# Patient Record
Sex: FEMALE | Race: WHITE | NOT HISPANIC OR LATINO | Employment: OTHER | URBAN - NONMETROPOLITAN AREA
[De-identification: names, ages, dates, MRNs, and addresses within clinical notes are randomized per-mention and may not be internally consistent; named-entity substitution may affect disease eponyms.]

---

## 2018-10-23 ENCOUNTER — OFFICE VISIT (OUTPATIENT)
Dept: FAMILY MEDICINE CLINIC | Facility: CLINIC | Age: 63
End: 2018-10-23

## 2018-10-23 VITALS
SYSTOLIC BLOOD PRESSURE: 130 MMHG | TEMPERATURE: 98.3 F | RESPIRATION RATE: 18 BRPM | HEART RATE: 57 BPM | HEIGHT: 65 IN | WEIGHT: 192.6 LBS | BODY MASS INDEX: 32.09 KG/M2 | OXYGEN SATURATION: 98 % | DIASTOLIC BLOOD PRESSURE: 78 MMHG

## 2018-10-23 DIAGNOSIS — I10 ESSENTIAL HYPERTENSION: Primary | ICD-10-CM

## 2018-10-23 DIAGNOSIS — B00.9 HERPES SIMPLEX: ICD-10-CM

## 2018-10-23 DIAGNOSIS — E78.2 MIXED HYPERLIPIDEMIA: ICD-10-CM

## 2018-10-23 DIAGNOSIS — M72.2 PLANTAR FASCIITIS: ICD-10-CM

## 2018-10-23 DIAGNOSIS — E55.9 VITAMIN D DEFICIENCY: ICD-10-CM

## 2018-10-23 PROBLEM — M81.0 OSTEOPOROSIS: Status: ACTIVE | Noted: 2018-10-23

## 2018-10-23 PROBLEM — D12.2 BENIGN NEOPLASM OF ASCENDING COLON: Status: ACTIVE | Noted: 2018-10-23

## 2018-10-23 PROBLEM — E78.5 HYPERLIPIDEMIA: Status: ACTIVE | Noted: 2018-10-23

## 2018-10-23 PROCEDURE — 99203 OFFICE O/P NEW LOW 30 MIN: CPT | Performed by: NURSE PRACTITIONER

## 2018-10-23 RX ORDER — NAPROXEN SODIUM 550 MG/1
550 TABLET ORAL 2 TIMES DAILY WITH MEALS
COMMUNITY
End: 2018-10-23 | Stop reason: SDUPTHER

## 2018-10-23 RX ORDER — VALACYCLOVIR HYDROCHLORIDE 500 MG/1
1000 TABLET, FILM COATED ORAL 2 TIMES DAILY
COMMUNITY
End: 2018-10-23 | Stop reason: SDUPTHER

## 2018-10-23 RX ORDER — LOVASTATIN 20 MG/1
20 TABLET ORAL NIGHTLY
Qty: 90 TABLET | Refills: 1 | Status: SHIPPED | OUTPATIENT
Start: 2018-10-23 | End: 2019-02-12 | Stop reason: SDUPTHER

## 2018-10-23 RX ORDER — ASPIRIN 81 MG/1
81 TABLET, CHEWABLE ORAL DAILY
COMMUNITY

## 2018-10-23 RX ORDER — VALACYCLOVIR HYDROCHLORIDE 500 MG/1
1000 TABLET, FILM COATED ORAL 2 TIMES DAILY
Qty: 60 TABLET | Refills: 5 | Status: SHIPPED | OUTPATIENT
Start: 2018-10-23 | End: 2019-10-16 | Stop reason: SDUPTHER

## 2018-10-23 RX ORDER — NAPROXEN SODIUM 550 MG/1
550 TABLET ORAL 2 TIMES DAILY WITH MEALS
Qty: 90 TABLET | Refills: 1 | OUTPATIENT
Start: 2018-10-23 | End: 2019-02-11 | Stop reason: HOSPADM

## 2018-10-23 RX ORDER — MULTIVIT WITH MINERALS/LUTEIN
1000 TABLET ORAL DAILY
COMMUNITY
End: 2019-02-11

## 2018-10-23 RX ORDER — LOVASTATIN 20 MG/1
20 TABLET ORAL NIGHTLY
COMMUNITY
End: 2018-10-23 | Stop reason: SDUPTHER

## 2018-10-23 RX ORDER — ATENOLOL 50 MG/1
50 TABLET ORAL DAILY
Qty: 90 TABLET | Refills: 1 | Status: SHIPPED | OUTPATIENT
Start: 2018-10-23 | End: 2019-02-12

## 2018-10-23 RX ORDER — HYDROCHLOROTHIAZIDE 25 MG/1
25 TABLET ORAL DAILY
Qty: 90 TABLET | Refills: 1 | Status: SHIPPED | OUTPATIENT
Start: 2018-10-23 | End: 2019-02-12

## 2018-10-23 RX ORDER — ATENOLOL 50 MG/1
50 TABLET ORAL DAILY
COMMUNITY
End: 2018-10-23 | Stop reason: SDUPTHER

## 2018-10-23 RX ORDER — HYDROCHLOROTHIAZIDE 25 MG/1
25 TABLET ORAL DAILY
COMMUNITY
End: 2018-10-23 | Stop reason: SDUPTHER

## 2018-10-23 NOTE — PATIENT INSTRUCTIONS
Plantar Fasciitis  Plantar fasciitis is a painful foot condition that affects the heel. It occurs when the band of tissue that connects the toes to the heel bone (plantar fascia) becomes irritated. This can happen after exercising too much or doing other repetitive activities (overuse injury). The pain from plantar fasciitis can range from mild irritation to severe pain that makes it difficult for you to walk or move. The pain is usually worse in the morning or after you have been sitting or lying down for a while.  What are the causes?  This condition may be caused by:  · Standing for long periods of time.  · Wearing shoes that do not fit.  · Doing high-impact activities, including running, aerobics, and ballet.  · Being overweight.  · Having an abnormal way of walking (gait).  · Having tight calf muscles.  · Having high arches in your feet.  · Starting a new athletic activity.    What are the signs or symptoms?  The main symptom of this condition is heel pain. Other symptoms include:  · Pain that gets worse after activity or exercise.  · Pain that is worse in the morning or after resting.  · Pain that goes away after you walk for a few minutes.    How is this diagnosed?  This condition may be diagnosed based on your signs and symptoms. Your health care provider will also do a physical exam to check for:  · A tender area on the bottom of your foot.  · A high arch in your foot.  · Pain when you move your foot.  · Difficulty moving your foot.    You may also need to have imaging studies to confirm the diagnosis. These can include:  · X-rays.  · Ultrasound.  · MRI.    How is this treated?  Treatment for plantar fasciitis depends on the severity of the condition. Your treatment may include:  · Rest, ice, and over-the-counter pain medicines to manage your pain.  · Exercises to stretch your calves and your plantar fascia.  · A splint that holds your foot in a stretched, upward position while you sleep (night  splint).  · Physical therapy to relieve symptoms and prevent problems in the future.  · Cortisone injections to relieve severe pain.  · Extracorporeal shock wave therapy (ESWT) to stimulate damaged plantar fascia with electrical impulses. It is often used as a last resort before surgery.  · Surgery, if other treatments have not worked after 12 months.    Follow these instructions at home:  · Take medicines only as directed by your health care provider.  · Avoid activities that cause pain.  · Roll the bottom of your foot over a bag of ice or a bottle of cold water. Do this for 20 minutes, 3-4 times a day.  · Perform simple stretches as directed by your health care provider.  · Try wearing athletic shoes with air-sole or gel-sole cushions or soft shoe inserts.  · Wear a night splint while sleeping, if directed by your health care provider.  · Keep all follow-up appointments with your health care provider.  How is this prevented?  · Do not perform exercises or activities that cause heel pain.  · Consider finding low-impact activities if you continue to have problems.  · Lose weight if you need to.  The best way to prevent plantar fasciitis is to avoid the activities that aggravate your plantar fascia.  Contact a health care provider if:  · Your symptoms do not go away after treatment with home care measures.  · Your pain gets worse.  · Your pain affects your ability to move or do your daily activities.  This information is not intended to replace advice given to you by your health care provider. Make sure you discuss any questions you have with your health care provider.  Document Released: 09/12/2002 Document Revised: 05/22/2017 Document Reviewed: 10/28/2015  [employer] Interactive Patient Education © 2018 [employer]

## 2018-10-23 NOTE — PROGRESS NOTES
Chief Complaint   Patient presents with   • Establish Care     Pt is here to establish care today.            HPI  Ave Singh is a 62 y.o. female presents today to establish care, have labs and refills. She denies any fever, chills does have some congestion left, went to  for sinus infection about 4 days ago and she is still finishing up the antibiotics.  She says that she has flare ups of plantar fasciitis and she keeps naproxen on hand for those flares. Wants to get flu injection.  I told her she needed to return when she is well and off antibiotics.      HTN  Currently takes atenolol and hctz.  Takes medications as prescribed without missed doses, reports no episodes of hypotension or any additional adverse effects from medications(s)  Patient reports BP's at home are normal.  Denies chest pain/chest pressure or discomfort, shortness of breath with exertion, headaches, palpitations, irregular heart beat, tachycardia, lower extremity edema, orthopnea, near-syncope.     Chronic problems: HTN stable with atenolol and hctz, hyperlipidemia stable with lovastatin, plantar fasciitis stable with naproxen, herpes simplex valacyclovir, osteoporosis       PCP:  Yolanda Grant, DNP, APRN    Allergies   Allergen Reactions   • Amoxicillin Rash   • Penicillins Rash       Past Medical History:   Diagnosis Date   • Hyperlipidemia    • Hypertension        Past Surgical History:   Procedure Laterality Date   • HYSTERECTOMY         Social History     Social History   • Marital status:      Social History Main Topics   • Smoking status: Never Smoker   • Smokeless tobacco: Never Used   • Alcohol use No   • Drug use: No   • Sexual activity: Defer     Other Topics Concern   • Not on file       Family History   Problem Relation Age of Onset   • Cancer Mother    • Heart disease Father    • No Known Problems Brother        No current outpatient prescriptions on file prior to visit.     No current  "facility-administered medications on file prior to visit.         REVIEW OF SYMPTOMS: (Positives bolded)  General:  weight loss, fever, chills, night sweats, fatigue, appetite loss  HEENT:  blurry vision, eye pain, eye discharge, dry eyes, decreased vision  Respiratory: shortness of breath, cough, hemoptysis, wheezing, pleurisy,   Cardiovascular:  chest pain, PND, palpitation, edema, orthopnea, syncope, swelling of extremities  Gastro: Nausea, vomiting, diarrhea, hematemesis, abdominal pain, constipation  Genito: hematuria, dysuria, glycosuria, hesitancy, frequency, incontinence  Musckelo: Arthralgia, myalgia, muscle weakness, joint swelling, NSAID use  Skin: rash, pruritis, sores, nail changes, skin thickening, change in wart/mole, itching, rash, new lesions, pruritus, nail changes  Neuro:  Migraine, numbness, ataxia, tremor, vertigo, weakness, memory loss  \"All other systems reviewed and negative, except as listed above.”      OBJECTIVE:  Constitutional:  Appearance-No acute distress, Consistent with stated age. Orientation- Oriented x 3, alert Posture-Not doubled over. Gait-Normal pace, normal arm movement. Posture- Normal Build and Nutrition-Well developed and well nourished.  General- Patient is pleasant and cooperative with the interview and exam.    Integumentary: General-No rashes, ulcers or lesions. No edema.  Palpation- Normal skin moisture/turgor. Skin is warm to touch, no increased warmth. Capillary refill is normal bilateral Upper and lower extremity.     Head/Neck: Head- normocephalic and atraumatic.  Neck- without visible/palpable lumps or pulsations.  Palpation- No bony tenderness about head/neck along frontal, occiptial, temporal, parietal, mastoid, jawline, zygoma, orbit or any other location.  NO temporal artery tenderness. No TMJ tenderness. Normal cervical ROM.   Neck Supple.  Thyroid-No thyromegaly, no nodules    Eye: Bilaterally PERRLA, EOMI.  No discharge.  Upper and lower eyelids are normal. " Sclera/conjunctiva normal without discharge. Cornea is normal and clear. Lens is normal.  Eyeball appears normal. No ciliary flushing, no conjunctival injection.    ENMT:  Pinna- normal without tenderness or erythema.  External auditory canal Left- normal without erythema or discharge, no excessive cerumen. External auditory canal Right-normal without erythema or discharge, no excessive cerumen. TM left- Grey/pearly, normal light reflex and anatomy TM Right- Grey/pearly, normal light reflex and anatomy Hearing Assessment-normal to conversational speech at 2-5 feet.  Nose and sinus-No sinus tenderness along frontal/maxillary region. External appearance normal and midline. Nares- bilateral quiet airflow, no discharge. Nasal mucosa- No bleeding noted and no ulcerations observed. Pink, moist. Turbinates non boggy. Lips- normal color, moist without cracks/lesions Oral Cavity/Palate- hard/soft palate intact without lesions, oral mucosa pink and moist. Dentition assessed and discussed appropriate oral care. Tongue normal midline.  Oropharynx- no pharyngeal erythema, Uvula midline. No post nasal drip. No exudate. Salivary glands- Non tender to palpation    CHEST/LUNG: Inspection- symmetric chest wall no pectus deformity. Normal effort, no distress, no use of accessory muscles. Palpation- nontender sternum, ribline.  No abnormal pulsations. Auscultation- Breath sounds normal throughout all lung fields.  Normal tracheal sounds, Normal bronchial sounds overlying sternum, Bronchovessicular sounds normal between scapulae posteriorly, Normal vessicular breath sounds heard throughout periphery. Lungs are clear today. Adventitious sounds- No wheezes, rales, rhonchi.     CARDIOVASCULAR:  Carotid artery- normal, no bruits or abnormal pulsations. Jugular vein- no pulsations. Palpation/Percussion- Normal PMI, no palpable thrill  Auscultation- Regular rate and rhythm. No murmur noted in sitting, supine positions. Extremities- no digital  clubbing, cyanosis, edema, increased warmth.    ABDOMEN: Inspection- normal and no visible pulsations. Normal contour. Auscultation- Bowel sounds normal, no abdominal bruits. Palpation/Percussion- soft, non-tender, no rebound tenderness, no rigidity (guarding), no jar tenderness, no masses.  Liver-no hepatomegaly, Spleen - no splenomegaly, Hernias- none. Rectal not examined.     Peripheral Vascular: Upper extremity Left- Normal temperature with pink nailbeds and no ulcerations.  Upper extremity Right- Normal temperature with pink nailbeds and no ulcerations.  Lower extremity- Normal temperature with pink nailbeds and no ulcerations. DP pulses 2+ bilaterally.  Pedal hair intact.  Normal capillary refill. Edema- No edema.    Musculoskeletal: Generalized-No generalized swelling or edema of extremities, no digital clubbing or cyanosis, neurovascularly intact all four extremities.  Upper extremity- Symmetrical posture.  No visible deformity.  Normal sensation along medial and lateral upper extremity proximally and distally.  NO tenderness overlying shoulder, lateral/medial epicondyle.  5/5 and strength 5/5 bilateral UE.  Elbow palpated, no tenderness overlying olecranon.  Normal supination, pronation to active/passive ROM and to resisted rotation. Bicep insertion/tricep insertion appear normal without obvious pathology. Rotator cuff evaluated and intact.  Normal wrist ROM bilaterally. Normal hand movement, intrinsic muscles of hands normal. No tenderness to palpation of hands/wrists/elbows.  Lower extremity- Not tender to palpation, no pain, no swelling, edema or erythema of surrounding tissue, normal strength and tone.  Normal appearing hip ROM bilaterally without pain.  Knee ROM normal at 0-120 degrees. No tenderness overlying trochanters, no tenderness about patella, quad tendon, patellar tendon.  No tenderness at tibial tuberosity. Ankle normal ROM not tender to palpation along medial/lateral malleolus. Normal  movement of toes, no tenderness bilateral feet/toes.  Normal foot type. Calves symmetrical.  Stretching demonstrated today.  Spine/Ribs- No deformities, masses or tenderness, no known fractures, normal strength, Normal ROM. Normal stability No tenderness along C/T/L spine.  Normal appearing ROM about spine.      Neurological: General- Moves all 4 extremities symmetrically. Symmetrical face and body posture. Cranial nerves- individually evaluated II-XII and intact. PERRLA, Normal EOMI, visual/special senses appear intact, Face is symmetrical and normal sensation/movement, normal tongue, normal strength/posture of neck musculature. Balance- Romberg intact.  Reflexes- ntact with DTR 2+ patellar, Achilles, bicep, brachial,tricep. Ankle clonus normal with 2 beats.  Strength- 5/5 bilateral UE and LE. Soft touch- intact bilateral UE and LE.  Temperature sensation- intact bilateral UE and LE. Cerebellar testing-Rapid alternating movements intact.  Heel shin intact. Able to walk normal gait, normal heel toe walking. Neck- supple.      Neuropsych: Oriented- Person, place, time. (AAOx3), Mood/affect- normal and congruent. Able to articulate well. Speech-Normal speech, normal rate, normal tone, normal use of language, volume and coherence.  Thought content- normal with ability to perform basic computations and apply abstract thought/reason. Associations- intact, no SI/HI, no hallucinations, delusions, obsessions.  Judgment/insight- Appropriate. Memory-Recall intact, remote and recent memory intact. Knowledge- Age appropriate fund of knowledge, concentration and attention span normal.    Lymphatic: Head/Neck- normal size and non tender to palpation. Axillary- Head and neck LN are normal size and non tender to palpation. Femoral and Inguinal- normal size and non tender to palpation.      Assessment/Plan:  Ave was seen today for establish care.    Diagnoses and all orders for this visit:    Essential hypertension  -     atenolol  (TENORMIN) 50 MG tablet; Take 1 tablet by mouth Daily.  -     hydrochlorothiazide (HYDRODIURIL) 25 MG tablet; Take 1 tablet by mouth Daily.  -     CBC & Differential  -     Comprehensive metabolic panel    Mixed hyperlipidemia  -     lovastatin (MEVACOR) 20 MG tablet; Take 1 tablet by mouth Every Night.  -     Lipid panel    Herpes simplex  -     valACYclovir (VALTREX) 500 MG tablet; Take 2 tablets by mouth 2 (Two) Times a Day.    Plantar fasciitis  -     naproxen sodium (ANAPROX) 550 MG tablet; Take 1 tablet by mouth 2 (Two) Times a Day With Meals.    Vitamin D deficiency  -     Vitamin D 25 hydroxy    Morbid obesity:  BMI:   32.1    Weight:  192      Follow up plan:  Lose at least 3 pounds before next chronic visit, exercise at least 3 times a week for 30 minute increments, eat baked instead of fried foods, and eat healthier     -  Documentation of education   The patient BMI is outside this range and we recommended/discussed today to utilize a diet/exercise program to get back into the appropriate range.  Federal guidelines recommend that people under the age of 65 should have a BMI of 18.5-24.9  Food diary:  Bring to next visit  tial step is to document everything that is consumed. Pt's that have a food diary  Lose 2x the weight  by keeping track of foods.   Choose one bad food weekly and eliminate it from your diet.  Replace with one healthy food  Exercise diary: Goal over next 2-4 weeks is walk 30 minutes per day 5 days per week at pace difficult to hold conversation.   Drink more water, less soda.   Cut back on portion sizes.   Today we encouraged roughly a 1 lb per week weight loss with initial goal of 5% weight loss.  Avoid fast foods, eat more salads  If eating out for a meal, consider cutting food in half and placing into a to go container.  Individually portion any foods coming into the home   Use smaller plates  Drink 12 ozof water 30 minutes before meal as way to suppress appetite.  Discussed Contrave,  metformin, topamax, Belviq and the cost of medications  Encouraged internet programs or self help books  Set  Goals that are realistic   Educated on ways to measure food  Baseball: 1 cup good for green salad, frozen yogurt, medium piece of fruit  Handful:  ½ cup good cut fruit, cooked vegetables, pasta, rice  Egg:  ¼ cup good for dried fruit  Deck of cards: 3 ounces good for meat and poultry  Check book:  3 ounces grilled fish  Plate Method:  reduce plate size to 9 inch dinner plate.  Half of plate should be filled with non-starchy vegetables (broccoli, lettuce, cauliflower, tomatoes), ¼ plate with lean source of protein (lean chicken, turkey, fish), remaining ½ with whole grains (brown rice, potato, whole grain breads  Avoid liquid calories (regular soda, juice, coffee with cream)  Focus  on water, seltzer water and other non-calorie drinks  Replace regular sugar with non-caloric sweeteners  Avoid skipping meals: plan small regular meals throughout the day in order to keep your hunger controlled  Consider using meal replacements if unable to plan a healthy meal (protein shake, high protein bar)  Replace all white bread with whole wheat/whole grain alternative  Swap regular salad dressings (mayonnaise, butter, or low fat or fat free alternative)  Avoid high fat, high calorie, high carbohydrate snakes (cookies, pastries, cakes)  Snack on fruits, low fat dairy (yogurt, cottage cheese)            Management plan:  Take medications as prescribed; return to the clinic of new or worsening concerns.       Risks/benefits of current and new medications discussed with the patient and or family today.  The patient/family are aware and accept that if there any side effects they should call or return to clinic as soon as possible.  Appropriate F/U discussed for topics addressed today. All questions were answered to the satisfactory state of patient/family.  Should symptoms fail to improve or worsen they agree to call or return  to clinic or to go to the ER. Education handouts were offered on any new Rx if requested.  Discussed the importance of following up with any needed screening tests/labs/specialist appointments and any requested follow-up recommended by me today.  Importance of maintaining follow-up discussed and patient accepts that missed appointments can delay diagnosis and potentially lead to worsening of conditions.    Return in about 6 months (around 4/23/2019) for Recheck.  She will need to return for flu vaccine when she is well, and an annual exam    Yolanda Grant, DNP, APRN  10/23/2018

## 2018-10-24 LAB
25(OH)D3+25(OH)D2 SERPL-MCNC: 62.8 NG/ML (ref 30–100)
ALBUMIN SERPL-MCNC: 4.5 G/DL (ref 3.5–5)
ALBUMIN/GLOB SERPL: 1.5 G/DL (ref 1.1–2.5)
ALP SERPL-CCNC: 70 U/L (ref 24–120)
ALT SERPL-CCNC: 49 U/L (ref 0–54)
AST SERPL-CCNC: 33 U/L (ref 7–45)
BASOPHILS # BLD AUTO: 0.05 10*3/MM3 (ref 0–0.2)
BASOPHILS NFR BLD AUTO: 0.7 % (ref 0–2)
BILIRUB SERPL-MCNC: 0.5 MG/DL (ref 0.1–1)
BUN SERPL-MCNC: 20 MG/DL (ref 5–21)
BUN/CREAT SERPL: 26.7 (ref 7–25)
CALCIUM SERPL-MCNC: 9.6 MG/DL (ref 8.4–10.4)
CHLORIDE SERPL-SCNC: 99 MMOL/L (ref 98–110)
CHOLEST SERPL-MCNC: 142 MG/DL (ref 130–200)
CO2 SERPL-SCNC: 30 MMOL/L (ref 24–31)
CREAT SERPL-MCNC: 0.75 MG/DL (ref 0.5–1.4)
EOSINOPHIL # BLD AUTO: 0.26 10*3/MM3 (ref 0–0.7)
EOSINOPHIL NFR BLD AUTO: 3.4 % (ref 0–4)
ERYTHROCYTE [DISTWIDTH] IN BLOOD BY AUTOMATED COUNT: 12.1 % (ref 12–15)
GLOBULIN SER CALC-MCNC: 3.1 GM/DL
GLUCOSE SERPL-MCNC: 102 MG/DL (ref 70–100)
HCT VFR BLD AUTO: 44.2 % (ref 37–47)
HDLC SERPL-MCNC: 35 MG/DL
HGB BLD-MCNC: 14.2 G/DL (ref 12–16)
IMM GRANULOCYTES # BLD: 0.02 10*3/MM3 (ref 0–0.03)
IMM GRANULOCYTES NFR BLD: 0.3 % (ref 0–5)
LDLC SERPL CALC-MCNC: 71 MG/DL (ref 0–99)
LYMPHOCYTES # BLD AUTO: 2.11 10*3/MM3 (ref 0.72–4.86)
LYMPHOCYTES NFR BLD AUTO: 27.7 % (ref 15–45)
MCH RBC QN AUTO: 28.7 PG (ref 28–32)
MCHC RBC AUTO-ENTMCNC: 32.1 G/DL (ref 33–36)
MCV RBC AUTO: 89.5 FL (ref 82–98)
MONOCYTES # BLD AUTO: 0.55 10*3/MM3 (ref 0.19–1.3)
MONOCYTES NFR BLD AUTO: 7.2 % (ref 4–12)
NEUTROPHILS # BLD AUTO: 4.64 10*3/MM3 (ref 1.87–8.4)
NEUTROPHILS NFR BLD AUTO: 60.7 % (ref 39–78)
NRBC BLD AUTO-RTO: 0 /100 WBC (ref 0–0)
PLATELET # BLD AUTO: 241 10*3/MM3 (ref 130–400)
POTASSIUM SERPL-SCNC: 4.1 MMOL/L (ref 3.5–5.3)
PROT SERPL-MCNC: 7.6 G/DL (ref 6.3–8.7)
RBC # BLD AUTO: 4.94 10*6/MM3 (ref 4.2–5.4)
SODIUM SERPL-SCNC: 140 MMOL/L (ref 135–145)
TRIGL SERPL-MCNC: 178 MG/DL (ref 0–149)
VLDLC SERPL CALC-MCNC: 35.6 MG/DL
WBC # BLD AUTO: 7.63 10*3/MM3 (ref 4.8–10.8)

## 2019-02-11 ENCOUNTER — HOSPITAL ENCOUNTER (EMERGENCY)
Facility: HOSPITAL | Age: 64
Discharge: HOME | End: 2019-02-11
Attending: EMERGENCY MEDICINE | Admitting: EMERGENCY MEDICINE

## 2019-02-11 ENCOUNTER — OFFICE VISIT (OUTPATIENT)
Dept: FAMILY MEDICINE CLINIC | Facility: CLINIC | Age: 64
End: 2019-02-11

## 2019-02-11 ENCOUNTER — APPOINTMENT (OUTPATIENT)
Dept: GENERAL RADIOLOGY | Facility: HOSPITAL | Age: 64
End: 2019-02-11

## 2019-02-11 ENCOUNTER — APPOINTMENT (OUTPATIENT)
Dept: CARDIOLOGY | Facility: HOSPITAL | Age: 64
End: 2019-02-11

## 2019-02-11 VITALS
RESPIRATION RATE: 18 BRPM | BODY MASS INDEX: 32.86 KG/M2 | TEMPERATURE: 98.1 F | WEIGHT: 197.2 LBS | HEIGHT: 65 IN | SYSTOLIC BLOOD PRESSURE: 148 MMHG | HEART RATE: 65 BPM | OXYGEN SATURATION: 93 % | DIASTOLIC BLOOD PRESSURE: 98 MMHG

## 2019-02-11 VITALS
OXYGEN SATURATION: 93 % | HEART RATE: 71 BPM | DIASTOLIC BLOOD PRESSURE: 70 MMHG | BODY MASS INDEX: 29.87 KG/M2 | WEIGHT: 197.09 LBS | RESPIRATION RATE: 17 BRPM | HEIGHT: 68 IN | TEMPERATURE: 97.9 F | SYSTOLIC BLOOD PRESSURE: 145 MMHG

## 2019-02-11 DIAGNOSIS — K21.9 GASTROESOPHAGEAL REFLUX DISEASE WITHOUT ESOPHAGITIS: ICD-10-CM

## 2019-02-11 DIAGNOSIS — Z12.39 SCREENING FOR MALIGNANT NEOPLASM OF BREAST: ICD-10-CM

## 2019-02-11 DIAGNOSIS — E66.09 CLASS 1 OBESITY DUE TO EXCESS CALORIES WITH SERIOUS COMORBIDITY AND BODY MASS INDEX (BMI) OF 32.0 TO 32.9 IN ADULT: ICD-10-CM

## 2019-02-11 DIAGNOSIS — R07.9 CHEST PAIN, UNSPECIFIED TYPE: Primary | ICD-10-CM

## 2019-02-11 DIAGNOSIS — I49.9 IRREGULAR HEART BEAT: ICD-10-CM

## 2019-02-11 DIAGNOSIS — R00.1 BRADYCARDIA: ICD-10-CM

## 2019-02-11 DIAGNOSIS — I10 ESSENTIAL HYPERTENSION: Primary | ICD-10-CM

## 2019-02-11 LAB
ALBUMIN SERPL-MCNC: 4.7 G/DL (ref 3.5–5)
ALBUMIN/GLOB SERPL: 1.6 G/DL (ref 1.1–2.5)
ALP SERPL-CCNC: 67 U/L (ref 24–120)
ALT SERPL W P-5'-P-CCNC: 70 U/L (ref 0–54)
ANION GAP SERPL CALCULATED.3IONS-SCNC: 10 MMOL/L (ref 4–13)
AST SERPL-CCNC: 65 U/L (ref 7–45)
BASOPHILS # BLD AUTO: 0.03 10*3/MM3 (ref 0–0.2)
BASOPHILS NFR BLD AUTO: 0.4 % (ref 0–2)
BH CV STRESS BP STAGE 1: NORMAL
BH CV STRESS BP STAGE 2: NORMAL
BH CV STRESS BP STAGE 3: NORMAL
BH CV STRESS DURATION MIN STAGE 1: 3
BH CV STRESS DURATION MIN STAGE 2: 3
BH CV STRESS DURATION MIN STAGE 3: 1
BH CV STRESS DURATION SEC STAGE 1: 0
BH CV STRESS DURATION SEC STAGE 2: 0
BH CV STRESS DURATION SEC STAGE 3: 0
BH CV STRESS GRADE STAGE 1: 10
BH CV STRESS GRADE STAGE 2: 12
BH CV STRESS GRADE STAGE 3: 14
BH CV STRESS HR STAGE 1: 109
BH CV STRESS HR STAGE 2: 133
BH CV STRESS HR STAGE 3: 141
BH CV STRESS METS STAGE 1: 5
BH CV STRESS METS STAGE 2: 7.5
BH CV STRESS METS STAGE 3: 10
BH CV STRESS PROTOCOL 1: NORMAL
BH CV STRESS RECOVERY BP: NORMAL MMHG
BH CV STRESS RECOVERY HR: 78 BPM
BH CV STRESS SPEED STAGE 1: 1.7
BH CV STRESS SPEED STAGE 2: 2.5
BH CV STRESS SPEED STAGE 3: 3.4
BH CV STRESS STAGE 1: 1
BH CV STRESS STAGE 2: 2
BH CV STRESS STAGE 3: 3
BILIRUB SERPL-MCNC: 0.6 MG/DL (ref 0.1–1)
BUN BLD-MCNC: 15 MG/DL (ref 5–21)
BUN/CREAT SERPL: 27.8 (ref 7–25)
CALCIUM SPEC-SCNC: 9.4 MG/DL (ref 8.4–10.4)
CHLORIDE SERPL-SCNC: 100 MMOL/L (ref 98–110)
CO2 SERPL-SCNC: 29 MMOL/L (ref 24–31)
CREAT BLD-MCNC: 0.54 MG/DL (ref 0.5–1.4)
DEPRECATED RDW RBC AUTO: 39.8 FL (ref 40–54)
EOSINOPHIL # BLD AUTO: 0.12 10*3/MM3 (ref 0–0.7)
EOSINOPHIL NFR BLD AUTO: 1.7 % (ref 0–4)
ERYTHROCYTE [DISTWIDTH] IN BLOOD BY AUTOMATED COUNT: 12.9 % (ref 12–15)
GFR SERPL CREATININE-BSD FRML MDRD: 114 ML/MIN/1.73
GLOBULIN UR ELPH-MCNC: 3 GM/DL
GLUCOSE BLD-MCNC: 100 MG/DL (ref 70–100)
HCT VFR BLD AUTO: 40.9 % (ref 37–47)
HGB BLD-MCNC: 13.6 G/DL (ref 12–16)
HOLD SPECIMEN: NORMAL
HOLD SPECIMEN: NORMAL
IMM GRANULOCYTES # BLD AUTO: 0.02 10*3/MM3 (ref 0–0.05)
IMM GRANULOCYTES NFR BLD AUTO: 0.3 % (ref 0–5)
LYMPHOCYTES # BLD AUTO: 1.84 10*3/MM3 (ref 0.72–4.86)
LYMPHOCYTES NFR BLD AUTO: 26.3 % (ref 15–45)
MAXIMAL PREDICTED HEART RATE: 156 BPM
MCH RBC QN AUTO: 28 PG (ref 28–32)
MCHC RBC AUTO-ENTMCNC: 33.3 G/DL (ref 33–36)
MCV RBC AUTO: 84.2 FL (ref 82–98)
MONOCYTES # BLD AUTO: 0.43 10*3/MM3 (ref 0.19–1.3)
MONOCYTES NFR BLD AUTO: 6.2 % (ref 4–12)
NEUTROPHILS # BLD AUTO: 4.55 10*3/MM3 (ref 1.87–8.4)
NEUTROPHILS NFR BLD AUTO: 65.1 % (ref 39–78)
NRBC BLD AUTO-RTO: 0 /100 WBC (ref 0–0)
PERCENT MAX PREDICTED HR: 90.38 %
PLATELET # BLD AUTO: 214 10*3/MM3 (ref 130–400)
PMV BLD AUTO: 11.2 FL (ref 6–12)
POTASSIUM BLD-SCNC: 3.7 MMOL/L (ref 3.5–5.3)
PROT SERPL-MCNC: 7.7 G/DL (ref 6.3–8.7)
RBC # BLD AUTO: 4.86 10*6/MM3 (ref 4.2–5.4)
SODIUM BLD-SCNC: 139 MMOL/L (ref 135–145)
STRESS BASELINE BP: NORMAL MMHG
STRESS BASELINE HR: 61 BPM
STRESS PERCENT HR: 106 %
STRESS POST ESTIMATED WORKLOAD: 10 METS
STRESS POST EXERCISE DUR MIN: 7 MIN
STRESS POST EXERCISE DUR SEC: 0 SEC
STRESS POST PEAK BP: NORMAL MMHG
STRESS POST PEAK HR: 141 BPM
STRESS TARGET HR: 133 BPM
TROPONIN I SERPL-MCNC: <0.012 NG/ML (ref 0–0.03)
TROPONIN I SERPL-MCNC: <0.012 NG/ML (ref 0–0.03)
WBC NRBC COR # BLD: 6.99 10*3/MM3 (ref 4.8–10.8)
WHOLE BLOOD HOLD SPECIMEN: NORMAL
WHOLE BLOOD HOLD SPECIMEN: NORMAL

## 2019-02-11 PROCEDURE — 93017 CV STRESS TEST TRACING ONLY: CPT

## 2019-02-11 PROCEDURE — 84484 ASSAY OF TROPONIN QUANT: CPT | Performed by: EMERGENCY MEDICINE

## 2019-02-11 PROCEDURE — 93000 ELECTROCARDIOGRAM COMPLETE: CPT | Performed by: NURSE PRACTITIONER

## 2019-02-11 PROCEDURE — 93352 ADMIN ECG CONTRAST AGENT: CPT | Performed by: INTERNAL MEDICINE

## 2019-02-11 PROCEDURE — 93010 ELECTROCARDIOGRAM REPORT: CPT | Performed by: INTERNAL MEDICINE

## 2019-02-11 PROCEDURE — 99284 EMERGENCY DEPT VISIT MOD MDM: CPT

## 2019-02-11 PROCEDURE — 80053 COMPREHEN METABOLIC PANEL: CPT | Performed by: EMERGENCY MEDICINE

## 2019-02-11 PROCEDURE — 25010000002 PERFLUTREN 6.52 MG/ML SUSPENSION: Performed by: INTERNAL MEDICINE

## 2019-02-11 PROCEDURE — 93005 ELECTROCARDIOGRAM TRACING: CPT | Performed by: EMERGENCY MEDICINE

## 2019-02-11 PROCEDURE — 93350 STRESS TTE ONLY: CPT

## 2019-02-11 PROCEDURE — 99214 OFFICE O/P EST MOD 30 MIN: CPT | Performed by: NURSE PRACTITIONER

## 2019-02-11 PROCEDURE — 85025 COMPLETE CBC W/AUTO DIFF WBC: CPT | Performed by: EMERGENCY MEDICINE

## 2019-02-11 PROCEDURE — 93350 STRESS TTE ONLY: CPT | Performed by: INTERNAL MEDICINE

## 2019-02-11 PROCEDURE — 93018 CV STRESS TEST I&R ONLY: CPT | Performed by: INTERNAL MEDICINE

## 2019-02-11 PROCEDURE — 71045 X-RAY EXAM CHEST 1 VIEW: CPT

## 2019-02-11 RX ORDER — LANSOPRAZOLE 15 MG/1
30 CAPSULE, DELAYED RELEASE ORAL DAILY
Qty: 20 CAPSULE | Refills: 0 | Status: SHIPPED | OUTPATIENT
Start: 2019-02-11 | End: 2019-03-13

## 2019-02-11 RX ORDER — SODIUM CHLORIDE 0.9 % (FLUSH) 0.9 %
10 SYRINGE (ML) INJECTION AS NEEDED
Status: DISCONTINUED | OUTPATIENT
Start: 2019-02-11 | End: 2019-02-11 | Stop reason: HOSPADM

## 2019-02-11 RX ORDER — ASPIRIN 81 MG/1
324 TABLET, CHEWABLE ORAL ONCE
Status: COMPLETED | OUTPATIENT
Start: 2019-02-11 | End: 2019-02-11

## 2019-02-11 RX ORDER — LANOLIN ALCOHOL/MO/W.PET/CERES
1000 CREAM (GRAM) TOPICAL DAILY
COMMUNITY
End: 2019-09-11

## 2019-02-11 RX ADMIN — PERFLUTREN 8.48 MG: 6.52 INJECTION, SUSPENSION INTRAVENOUS at 15:38

## 2019-02-11 RX ADMIN — ASPIRIN 81 MG 324 MG: 81 TABLET ORAL at 12:02

## 2019-02-11 NOTE — PROGRESS NOTES
Chief Complaint   Patient presents with   • Rapid Heart Rate     [facility] and was told she had a murmur or her heart was skipping   States she has had a few episodes when she thought heart was racing.          HPI  Ave Singh is a 63 y.o. female presents today with heart skipping a beat.  Says that she went to [facility] for being sick (2019). Was told her heart was skipping a beat and she either had a heart murmur or beat issues and told to follow up with PCP.  She didn't want to follow up until she was off the antibiotic.  Has HTN and takes atenolol and hctz and says bps have been running in the 130's over 80's however today it is elevated at  188/98.  Says she takes her bp meds regularly.  Denies chest pain/chest pressure or discomfort, shortness of breath with exertion, headaches, lower extremity edema, orthopnea, or near-syncope.  Has tachycardia at times and feels heart skip beats, and palpations.   Her father  of heart attack at age 67. Says she is having a lot of reflux and taking tagament.     Chronic problems: HTN stable with atenolol and hctz, hyperlipidemia stable with lovastatin, plantar fasciitis stable with naproxen, herpes simplex valacyclovir, osteoporosis       PCP:  Yolanda Grant, DNP, APRN    Allergies   Allergen Reactions   • Amoxicillin Rash   • Penicillins Rash       Past Medical History:   Diagnosis Date   • Hyperlipidemia    • Hypertension        Past Surgical History:   Procedure Laterality Date   • HYSTERECTOMY         Social History     Socioeconomic History   • Marital status:      Spouse name: Not on file   • Number of children: Not on file   • Years of education: Not on file   • Highest education level: Not on file   Tobacco Use   • Smoking status: Never Smoker   • Smokeless tobacco: Never Used   Substance and Sexual Activity   • Alcohol use: No   • Drug use: No   • Sexual activity: Defer       Family History   Problem Relation Age  "of Onset   • Cancer Mother    • Heart disease Father    • No Known Problems Brother        Current Outpatient Medications on File Prior to Visit   Medication Sig Dispense Refill   • aspirin 81 MG chewable tablet Chew 81 mg Daily.     • atenolol (TENORMIN) 50 MG tablet Take 1 tablet by mouth Daily. 90 tablet 1   • Calcium Carb-Cholecalciferol (LIQUID CALCIUM WITH D3) 600-500 MG-UNIT capsule Take 2 tablets by mouth Daily.     • hydrochlorothiazide (HYDRODIURIL) 25 MG tablet Take 1 tablet by mouth Daily. 90 tablet 1   • lovastatin (MEVACOR) 20 MG tablet Take 1 tablet by mouth Every Night. 90 tablet 1   • naproxen sodium (ANAPROX) 550 MG tablet Take 1 tablet by mouth 2 (Two) Times a Day With Meals. 90 tablet 1   • valACYclovir (VALTREX) 500 MG tablet Take 2 tablets by mouth 2 (Two) Times a Day. 60 tablet 5   • vitamin B-12 (CYANOCOBALAMIN) 100 MCG tablet Take 50 mcg by mouth Daily.     • [DISCONTINUED] vitamin E 1000 UNIT capsule Take 1,000 Units by mouth Daily.       No current facility-administered medications on file prior to visit.         REVIEW OF SYMPTOMS: (Positives bolded)  General:  weight loss, fever, chills, night sweats, fatigue, appetite loss  HEENT:  blurry vision, eye pain, eye discharge, dry eyes, decreased vision  Respiratory: shortness of breath, cough, hemoptysis, wheezing, pleurisy,   Cardiovascular:  chest pain, PND, palpitation, edema, orthopnea, syncope, irregular heart rate  Gastro: Nausea, vomiting, diarrhea, hematemesis, abdominal pain, constipation, Reflux  Genito: hematuria, dysuria, glycosuria, hesitancy, frequency, incontinence  Musckelo: Arthralgia, myalgia, muscle weakness, joint swelling, NSAID use  Skin: rash, pruritis, sores, nail changes, skin thickening, change in wart/mole, itching, rash, new lesions, pruritus, nail changes  Neuro:  Migraine, numbness, ataxia, tremor, vertigo, weakness, memory loss  \"All other systems reviewed and negative, except as listed " above.”      OBJECTIVE:  Constitutional:  Appearance-No acute distress, Consistent with stated age. Orientation- Oriented x 3, alert Posture-Not doubled over. Gait-Normal pace, normal arm movement. Posture- Normal Build and Nutrition-Well developed and well nourished.  General- Patient is pleasant and cooperative with the interview and exam.    Integumentary: General-No rashes, ulcers or lesions. No edema.  Palpation- Normal skin moisture/turgor. Skin is warm to touch, no increased warmth. Capillary refill is normal bilateral Upper and lower extremity.     Head/Neck: Head- normocephalic and atraumatic.  Neck- without visible/palpable lumps or pulsations.  Palpation- No bony tenderness about head/neck along frontal, occiptial, temporal, parietal, mastoid, jawline, zygoma, orbit or any other location.  NO temporal artery tenderness. No TMJ tenderness. Normal cervical ROM.   Neck Supple.  Thyroid-No thyromegaly, no nodules    Eye: Bilaterally PERRLA, EOMI.  No discharge.  Upper and lower eyelids are normal. Sclera/conjunctiva normal without discharge. Cornea is normal and clear. Lens is normal.  Eyeball appears normal. No ciliary flushing, no conjunctival injection.    CHEST/LUNG: Inspection- symmetric chest wall no pectus deformity. Normal effort, no distress, no use of accessory muscles. Palpation- nontender sternum, ribline.  No abnormal pulsations. Auscultation- Breath sounds normal throughout all lung fields.  Normal tracheal sounds, Normal bronchial sounds overlying sternum, Bronchovessicular sounds normal between scapulae posteriorly, Normal vessicular breath sounds heard throughout periphery. Lungs are clear today. Adventitious sounds- No wheezes, rales, rhonchi.     CARDIOVASCULAR:  Carotid artery- normal, no bruits or abnormal pulsations. Jugular vein- no pulsations. Palpation/Percussion- Normal PMI, no palpable thrill  Auscultation- irregular rhythm, and bradycardia. No murmur noted in sitting, supine  positions. Extremities- no digital clubbing, cyanosis, edema, increased warmth.    ABDOMEN: Inspection- normal and no visible pulsations. Normal contour. Auscultation- Bowel sounds normal, no abdominal bruits. Palpation/Percussion- soft, non-tender, no rebound tenderness, no rigidity (guarding), no jar tenderness, no masses.  Liver-no hepatomegaly, Spleen - no splenomegaly, Hernias- none. Rectal not examined.     Peripheral Vascular: Upper extremity Left- Normal temperature with pink nailbeds and no ulcerations.  Upper extremity Right- Normal temperature with pink nailbeds and no ulcerations.  Lower extremity- Normal temperature with pink nailbeds and no ulcerations. DP pulses 2+ bilaterally.  Pedal hair intact.  Normal capillary refill. Edema- No edema.    Neuropsych: Oriented- Person, place, time. (AAOx3), Mood/affect- normal and congruent. Able to articulate well. Speech-Normal speech, normal rate, normal tone, normal use of language, volume and coherence.  Thought content- normal with ability to perform basic computations and apply abstract thought/reason. Associations- intact, no SI/HI, no hallucinations, delusions, obsessions.  Judgment/insight- Appropriate. Memory-Recall intact, remote and recent memory intact. Knowledge- Age appropriate fund of knowledge, concentration and attention span normal.    Lymphatic: Head/Neck- normal size and non tender to palpation. Axillary- Head and neck LN are normal size and non tender to palpation. Femoral and Inguinal- normal size and non tender to palpation.      Assessment/Plan:  Ave was seen today for rapid heart rate.    Diagnoses and all orders for this visit:    Essential hypertension with elevation today 188/98, with recheck 15 minutes 14/8/98  Irregular heart beat  Bradycardia  Discussed the need to go to ER for further evaluation and treatment at the ER.  We discussed risks and benefits of going to ER since her BP is elevated and EKG is abnormal.  I discussed the  risk of having a heart attack, stroke, even death.  I discussed the need for echo, holter monitor and cardiology referral.  Spoke to  on the phone and he agrees with my recommendation to go onto the ER.  Pt agrees to go to ER and  will take her.  I told her to call back and I can set up for holter monitor and cardiology referral.       EKG: there are no previous tracings available for comparison, sinus bradycardia rate 56, LBBB, left axis deviation.      ECG 12 Lead  Date/Time: 2/11/2019 9:50 AM  Performed by: Yolanda Grant DNP, APRN  Authorized by: Yolanda Grant DNP, MARY   Comparison: not compared with previous ECG   Previous ECG: no previous ECG available  Rhythm: sinus bradycardia  Rate: bradycardic  BPM: 56  Conduction: left bundle branch block  QRS axis: left  Other: no other findings  Clinical impression: abnormal ECG        Screening for malignant neoplasm of breast  -     Mammo Screening Digital Tomosynthesis Bilateral With CAD    Class 1 obesity due to excess calories with serious comorbidity and body mass index (BMI) of 32.0 to 32.9 in adult      Morbid obesity:  BMI:  32.8     Weight:    197    Follow up plan:  Lose at least 3 pounds before next chronic visit, exercise at least 3 times a week for 30 minute increments, eat baked instead of fried foods, and eat healthier     -  Documentation of education   The patient BMI is outside this range and we recommended/discussed today to utilize a diet/exercise program to get back into the appropriate range.  Federal guidelines recommend that people under the age of 65 should have a BMI of 18.5-24.9  Food diary:  Bring to next visit  tial step is to document everything that is consumed. Pt's that have a food diary  Lose 2x the weight  by keeping track of foods.   Choose one bad food weekly and eliminate it from your diet.  Replace with one healthy food  Exercise diary: Goal over next 2-4 weeks is walk 30 minutes per day 5 days per week  at pace difficult to hold conversation.   Drink more water, less soda.   Cut back on portion sizes.   Today we encouraged roughly a 1 lb per week weight loss with initial goal of 5% weight loss.  Avoid fast foods, eat more salads  If eating out for a meal, consider cutting food in half and placing into a to go container.  Individually portion any foods coming into the home   Use smaller plates  Drink 12 ozof water 30 minutes before meal as way to suppress appetite.  Discussed Contrave, metformin, topamax, Belviq and the cost of medications  Encouraged internet programs or self help books  Set  Goals that are realistic   Educated on ways to measure food  Baseball: 1 cup good for green salad, frozen yogurt, medium piece of fruit  Handful:  ½ cup good cut fruit, cooked vegetables, pasta, rice  Egg:  ¼ cup good for dried fruit  Deck of cards: 3 ounces good for meat and poultry  Check book:  3 ounces grilled fish  Plate Method:  reduce plate size to 9 inch dinner plate.  Half of plate should be filled with non-starchy vegetables (broccoli, lettuce, cauliflower, tomatoes), ¼ plate with lean source of protein (lean chicken, turkey, fish), remaining ½ with whole grains (brown rice, potato, whole grain breads  Avoid liquid calories (regular soda, juice, coffee with cream)  Focus  on water, seltzer water and other non-calorie drinks  Replace regular sugar with non-caloric sweeteners  Avoid skipping meals: plan small regular meals throughout the day in order to keep your hunger controlled  Consider using meal replacements if unable to plan a healthy meal (protein shake, high protein bar)  Replace all white bread with whole wheat/whole grain alternative  Swap regular salad dressings (mayonnaise, butter, or low fat or fat free alternative)  Avoid high fat, high calorie, high carbohydrate snakes (cookies, pastries, cakes)  Snack on fruits, low fat dairy (yogurt, cottage cheese)            Management plan:  Take medications as  prescribed; return to the clinic of new or worsening concerns.       Risks/benefits of current and new medications discussed with the patient and or family today.  The patient/family are aware and accept that if there any side effects they should call or return to clinic as soon as possible.  Appropriate F/U discussed for topics addressed today. All questions were answered to the satisfactory state of patient/family.  Should symptoms fail to improve or worsen they agree to call or return to clinic or to go to the ER. Education handouts were offered on any new Rx if requested.  Discussed the importance of following up with any needed screening tests/labs/specialist appointments and any requested follow-up recommended by me today.  Importance of maintaining follow-up discussed and patient accepts that missed appointments can delay diagnosis and potentially lead to worsening of conditions.     To [facility] for further evaluation and treatment via private vehicle  to drive her  Report called to [facility].    Yolanda Grant, BIBIANA, APRN  2/11/2019

## 2019-02-11 NOTE — PATIENT INSTRUCTIONS
Bradycardia, Adult  Bradycardia is a slower-than-normal heartbeat. A normal resting heart rate for an adult ranges from 60 to 100 beats per minute. With bradycardia, the resting heart rate is less than 60 beats per minute.  Bradycardia can prevent enough oxygen from reaching certain areas of your body when you are active. It can be serious if it keeps enough oxygen from reaching your brain and other parts of your body. Bradycardia is not a problem for everyone. For some healthy adults, a slow resting heart rate is normal.  What are the causes?  This condition may be caused by:  · A problem with the heart, including:  ? A problem with the heart's electrical system, such as a heart block.  ? A problem with the heart's natural pacemaker (sinus node).  ? Heart disease.  ? A heart attack.  ? Heart damage.  ? A heart infection.  ? A heart condition that is present at birth (congenital heart defect).  · Certain medicines that treat heart conditions.  · Certain conditions, such as hypothyroidism and obstructive sleep apnea.  · Problems with the balance of chemicals and other substances, like potassium, in the blood.    What increases the risk?  This condition is more likely to develop in adults who:  · Are age 65 or older.  · Have high blood pressure (hypertension), high cholesterol (hyperlipidemia), or diabetes.  · Drink heavily, use tobacco or nicotine products, or use drugs.  · Are stressed.    What are the signs or symptoms?  Symptoms of this condition include:  · Light-headedness.  · Feeling faint or fainting.  · Fatigue and weakness.  · Shortness of breath.  · Chest pain (angina).  · Drowsiness.  · Confusion.  · Dizziness.    How is this diagnosed?  This condition may be diagnosed based on:  · Your symptoms.  · Your medical history.  · A physical exam.    During the exam, your health care provider will listen to your heartbeat and check your pulse. To confirm the diagnosis, your health care provider may order tests,  such as:  · Blood tests.  · An electrocardiogram (ECG). This test records the heart's electrical activity. The test can show how fast your heart is beating and whether the heartbeat is steady.  · A test in which you wear a portable device (event recorder or Holter monitor) to record your heart's electrical activity while you go about your day.  · An exercise test.    How is this treated?  Treatment for this condition depends on the cause of the condition and how severe your symptoms are. Treatment may involve:  · Treatment of the underlying condition.  · Changing your medicines or how much medicine you take.  · Having a small, battery-operated device called a pacemaker implanted under the skin. When bradycardia occurs, this device can be used to increase your heart rate and help your heart to beat in a regular rhythm.    Follow these instructions at home:  Lifestyle    · Manage any health conditions that contribute to bradycardia as told by your health care provider.  · Follow a heart-healthy diet. A nutrition specialist (dietitian) can help to educate you about healthy food options and changes.  · Follow an exercise program that is approved by your health care provider.  · Maintain a healthy weight.  · Try to reduce or manage your stress, such as with yoga or meditation. If you need help reducing stress, ask your health care provider.  · Do not use use any products that contain nicotine or tobacco, such as cigarettes and e-cigarettes. If you need help quitting, ask your health care provider.  · Do not use illegal drugs.  · Limit alcohol intake to no more than 1 drink per day for nonpregnant women and 2 drinks per day for men. One drink equals 12 oz of beer, 5 oz of wine, or 1½ oz of hard liquor.  General instructions  · Take over-the-counter and prescription medicines only as told by your health care provider.  · Keep all follow-up visits as directed by your health care provider. This is important.  How is this  prevented?  In some cases, bradycardia may be prevented by:  · Treating underlying medical problems.  · Stopping behaviors or medicines that can trigger the condition.    Contact a health care provider if:  · You feel light-headed or dizzy.  · You almost faint.  · You feel weak or are easily fatigued during physical activity.  · You experience confusion or have memory problems.  Get help right away if:  · You faint.  · You have an irregular heartbeat (palpitations).  · You have chest pain.  · You have trouble breathing.  This information is not intended to replace advice given to you by your health care provider. Make sure you discuss any questions you have with your health care provider.  Document Released: 09/09/2003 Document Revised: 08/15/2017 Document Reviewed: 06/08/2017  Elsevier Interactive Patient Education © 2017 [employer]

## 2019-02-11 NOTE — ED PROVIDER NOTES
Subjective   Patient has not been feeling well with elevated blood pressure some reflux symptoms went to see her primary MD was noted to have elevated blood pressure  and a left bundle branch block        Illness   Location:  None  Severity:  Moderate  Onset quality:  Gradual  Timing:  Intermittent  Chronicity:  Recurrent  Associated symptoms: no abdominal pain, no chest pain, no congestion, no cough, no diarrhea, no fatigue, no fever, no headaches, no loss of consciousness, no myalgias, no nausea, no rash, no rhinorrhea, no shortness of breath, no sore throat and no vomiting        Review of Systems   Constitutional: Negative.  Negative for fatigue and fever.   HENT: Negative.  Negative for congestion, rhinorrhea and sore throat.    Eyes: Negative.    Respiratory: Negative.  Negative for cough and shortness of breath.    Cardiovascular: Negative.  Negative for chest pain.   Gastrointestinal: Negative.  Negative for abdominal pain, diarrhea, nausea and vomiting.   Musculoskeletal: Negative.  Negative for back pain, myalgias and neck pain.   Skin: Negative.  Negative for rash.   Neurological: Negative.  Negative for loss of consciousness and headaches.   All other systems reviewed and are negative.      Past Medical History:   Diagnosis Date   • Hyperlipidemia    • Hypertension        Allergies   Allergen Reactions   • Amoxicillin Rash   • Penicillins Rash       Past Surgical History:   Procedure Laterality Date   • HYSTERECTOMY         Family History   Problem Relation Age of Onset   • Cancer Mother    • Heart disease Father    • No Known Problems Brother        Social History     Socioeconomic History   • Marital status:      Spouse name: Not on file   • Number of children: Not on file   • Years of education: Not on file   • Highest education level: Not on file   Tobacco Use   • Smoking status: Never Smoker   • Smokeless tobacco: Never Used   Substance and Sexual Activity   • Alcohol use: No   • Drug use: No    • Sexual activity: Defer           Objective   Physical Exam   Constitutional: She is oriented to person, place, and time. She appears well-developed and well-nourished.   HENT:   Head: Normocephalic and atraumatic.   Eyes: Conjunctivae and EOM are normal. Pupils are equal, round, and reactive to light.   Neck: Normal range of motion. Neck supple.   Cardiovascular: Normal rate, regular rhythm, normal heart sounds and intact distal pulses.   Pulmonary/Chest: Effort normal and breath sounds normal.   Abdominal: Soft. Bowel sounds are normal.   Musculoskeletal: Normal range of motion.   Neurological: She is alert and oriented to person, place, and time. She has normal reflexes.   Skin: Skin is warm and dry.   Psychiatric: She has a normal mood and affect.   Nursing note and vitals reviewed.      Procedures           ED Course  ED Course as of Feb 11 1726   Mon Feb 11, 2019 1722 Workup is negative case has been discussed with the patient she needs to keep a blood pressure diary and will put on PPIs  [TS]   1722  The patient's symptoms are now better.  Patient is not having pain.  No chest pain, difficulty breathing, nausea, vomiting or palpitations.  No anxiety or dizziness.  Vital signs have been reviewed and appear to be correct.  Physical exam findings are improved.  Alert.  Oriented X3 .  No acute distress.  Breath sounds normal.  No respiratory distress, decreased breath sounds or wheezes.  Normal heart rate and rhythm.  Heart sounds normal.  .  Abdomen soft and nontender.  No abdominal tenderness or guarding or rebound tenderness.  Skin warm and dry.  No cyanosis or diaphoresis.  Oriented X 3.  Not anxious.  No alteration in mental status or weakness.          [TS]      ED Course User Index  [TS] Esteban Tee MD                HEART Score (for prediction of 6-week risk of major adverse cardiac event) reviewed and/or performed as part of the patient evaluation and treatment planning process.  The result  associated with this review/performance is: 2       MDM      Final diagnoses:   Chest pain, unspecified type   Gastroesophageal reflux disease without esophagitis            Esteban Tee MD  02/11/19 1154       Esteban Tee MD  02/11/19 [id number]

## 2019-02-12 ENCOUNTER — OFFICE VISIT (OUTPATIENT)
Dept: FAMILY MEDICINE CLINIC | Facility: CLINIC | Age: 64
End: 2019-02-12

## 2019-02-12 VITALS
RESPIRATION RATE: 18 BRPM | HEART RATE: 73 BPM | TEMPERATURE: 97.9 F | OXYGEN SATURATION: 97 % | WEIGHT: 196.2 LBS | BODY MASS INDEX: 29.73 KG/M2 | DIASTOLIC BLOOD PRESSURE: 79 MMHG | SYSTOLIC BLOOD PRESSURE: 178 MMHG | HEIGHT: 68 IN

## 2019-02-12 DIAGNOSIS — E78.2 MIXED HYPERLIPIDEMIA: ICD-10-CM

## 2019-02-12 DIAGNOSIS — I10 ESSENTIAL HYPERTENSION: Primary | ICD-10-CM

## 2019-02-12 DIAGNOSIS — K21.9 GASTROESOPHAGEAL REFLUX DISEASE WITHOUT ESOPHAGITIS: ICD-10-CM

## 2019-02-12 PROCEDURE — 99214 OFFICE O/P EST MOD 30 MIN: CPT | Performed by: NURSE PRACTITIONER

## 2019-02-12 RX ORDER — LOVASTATIN 20 MG/1
20 TABLET ORAL NIGHTLY
Qty: 90 TABLET | Refills: 1 | Status: SHIPPED | OUTPATIENT
Start: 2019-02-12 | End: 2019-04-16

## 2019-02-12 RX ORDER — LANSOPRAZOLE 15 MG/1
15 CAPSULE, DELAYED RELEASE ORAL DAILY
Qty: 180 CAPSULE | Refills: 0 | Status: SHIPPED | OUTPATIENT
Start: 2019-02-12 | End: 2019-04-16 | Stop reason: SDUPTHER

## 2019-02-12 RX ORDER — LISINOPRIL AND HYDROCHLOROTHIAZIDE 25; 20 MG/1; MG/1
1 TABLET ORAL DAILY
Qty: 90 TABLET | Refills: 0 | Status: SHIPPED | OUTPATIENT
Start: 2019-02-12 | End: 2019-03-20

## 2019-02-12 NOTE — PROGRESS NOTES
Chief Complaint   Patient presents with   • Hypertension     Pt is here for followup.   Went to ED 2/11/2019.          HPI  Ave Singh is a 63 y.o. female presents today for follow up after going to the ER yesterday.  She said they did a stress test and an echo and was told everything was ok.  Her bp continues to be elevated and she says the ER doctor told her to discuss changing bp med because she was on an old medications.  I discussed this with the pt and explained to her the risks, benefits and the alternative options.       Chronic problems: HTN not stable with norvasc, hyperlipidemia satble with lovastatin, b12 def stable with vit b12 pill, fever blisters stable with valacyclovir.     PCP:  Yolanda Grant, DNP, APRN    Allergies   Allergen Reactions   • Amoxicillin Rash   • Penicillins Rash       Past Medical History:   Diagnosis Date   • Hyperlipidemia    • Hypertension        Past Surgical History:   Procedure Laterality Date   • HYSTERECTOMY         Social History     Socioeconomic History   • Marital status:      Spouse name: Not on file   • Number of children: Not on file   • Years of education: Not on file   • Highest education level: Not on file   Tobacco Use   • Smoking status: Never Smoker   • Smokeless tobacco: Never Used   Substance and Sexual Activity   • Alcohol use: No   • Drug use: No   • Sexual activity: Defer       Family History   Problem Relation Age of Onset   • Cancer Mother    • Heart disease Father    • No Known Problems Brother        Current Outpatient Medications on File Prior to Visit   Medication Sig Dispense Refill   • aspirin 81 MG chewable tablet Chew 81 mg Daily.     • Calcium Carb-Cholecalciferol (LIQUID CALCIUM WITH D3) 600-500 MG-UNIT capsule Take 2 tablets by mouth Daily.     • hydrochlorothiazide (HYDRODIURIL) 25 MG tablet Take 1 tablet by mouth Daily. 90 tablet 1   • lansoprazole (PREVACID) 15 MG capsule Take 2 capsules by mouth Daily. 20 capsule 0  "  • lovastatin (MEVACOR) 20 MG tablet Take 1 tablet by mouth Every Night. 90 tablet 1   • valACYclovir (VALTREX) 500 MG tablet Take 2 tablets by mouth 2 (Two) Times a Day. 60 tablet 5   • vitamin B-12 (CYANOCOBALAMIN) 100 MCG tablet Take 50 mcg by mouth Daily.     • [DISCONTINUED] atenolol (TENORMIN) 50 MG tablet Take 1 tablet by mouth Daily. 90 tablet 1     Current Facility-Administered Medications on File Prior to Visit   Medication Dose Route Frequency Provider Last Rate Last Dose   • [COMPLETED] perflutren (DEFINITY) lipid microspheres injection 8.476 mg  8.476 mg Intravenous Once Teodoro Murphy MD   8.476 mg at 02/11/19 1538   • [DISCONTINUED] sodium chloride 0.9 % flush 10 mL  10 mL Intravenous PRN Esteban Tee MD            REVIEW OF SYMPTOMS: (Positives bolded)  General:  weight loss, fever, chills, night sweats, fatigue, appetite loss  HEENT:  blurry vision, eye pain, eye discharge, dry eyes, decreased vision,   Respiratory: shortness of breath, cough, hemoptysis, wheezing, pleurisy,   Cardiovascular:  chest pain, PND, palpitation, edema, orthopnea, syncope, swelling of extremities  Gastro: Nausea, vomiting, diarrhea, hematemesis, abdominal pain, constipation  Genito: hematuria, dysuria, glycosuria, hesitancy, frequency, incontinence  Musckelo: Arthralgia, myalgia, muscle weakness, joint swelling, NSAID use  Skin: rash, pruritis, sores, nail changes, skin thickening, change in wart/mole, itching, rash, new lesions, pruritus, nail changes  Neuro:  Migraine, numbness, ataxia, tremor, vertigo, weakness, memory loss  \"All other systems reviewed and negative, except as listed above.”      OBJECTIVE:  Constitutional:  Appearance-No acute distress, Consistent with stated age. Orientation- Oriented x 3, alert Posture-Not doubled over. Gait-Normal pace, normal arm movement. Posture- Normal Build and Nutrition-Well developed and well nourished.  General- Patient is pleasant and cooperative with the " interview and exam.    Integumentary: General-No rashes, ulcers or lesions. No edema.  Palpation- Normal skin moisture/turgor. Skin is warm to touch, no increased warmth. Capillary refill is normal bilateral Upper and lower extremity.     Head/Neck: Head- normocephalic and atraumatic.  Neck- without visible/palpable lumps or pulsations.  Palpation- No bony tenderness about head/neck along frontal, occiptial, temporal, parietal, mastoid, jawline, zygoma, orbit or any other location.  NO temporal artery tenderness. No TMJ tenderness. Normal cervical ROM.   Neck Supple.  Thyroid-No thyromegaly, no nodules    Eye: Bilaterally PERRLA, EOMI.  No discharge.  Upper and lower eyelids are normal. Sclera/conjunctiva normal without discharge. Cornea is normal and clear. Lens is normal.  Eyeball appears normal. No ciliary flushing, no conjunctival injection.    CHEST/LUNG: Inspection- symmetric chest wall no pectus deformity. Normal effort, no distress, no use of accessory muscles. Palpation- nontender sternum, ribline.  No abnormal pulsations. Auscultation- Breath sounds normal throughout all lung fields.  Normal tracheal sounds, Normal bronchial sounds overlying sternum, Bronchovessicular sounds normal between scapulae posteriorly, Normal vessicular breath sounds heard throughout periphery. Lungs are clear today. Adventitious sounds- No wheezes, rales, rhonchi.     CARDIOVASCULAR:  Carotid artery- normal, no bruits or abnormal pulsations. Jugular vein- no pulsations. Palpation/Percussion- Normal PMI, no palpable thrill  Auscultation- Regular rate and rhythm. No murmur noted in sitting, supine positions. Extremities- no digital clubbing, cyanosis, edema, increased warmth.    Peripheral Vascular: Upper extremity Left- Normal temperature with pink nailbeds and no ulcerations.  Upper extremity Right- Normal temperature with pink nailbeds and no ulcerations.  Lower extremity- Normal temperature with pink nailbeds and no  ulcerations. DP pulses 2+ bilaterally.  Pedal hair intact.  Normal capillary refill. Edema- No edema.    Neurological: General- Moves all 4 extremities symmetrically. Symmetrical face and body posture. Cranial nerves- individually evaluated II-XII and intact. PERRLA, Normal EOMI, visual/special senses appear intact, Face is symmetrical and normal sensation/movement, normal tongue, normal strength/posture of neck musculature. Balance- Romberg intact.  Reflexes- ntact with DTR 2+ patellar, Achilles, bicep, brachial,tricep. Ankle clonus normal with 2 beats.  Strength- 5/5 bilateral UE and LE. Soft touch- intact bilateral UE and LE.  Temperature sensation- intact bilateral UE and LE. Cerebellar testing-Rapid alternating movements intact.  Heel shin intact. Able to walk normal gait, normal heel toe walking. Neck- supple.      Neuropsych: Oriented- Person, place, time. (AAOx3), Mood/affect- normal and congruent. Able to articulate well. Speech-Normal speech, normal rate, normal tone, normal use of language, volume and coherence.  Thought content- normal with ability to perform basic computations and apply abstract thought/reason. Associations- intact, no SI/HI, no hallucinations, delusions, obsessions.  Judgment/insight- Appropriate. Memory-Recall intact, remote and recent memory intact. Knowledge- Age appropriate fund of knowledge, concentration and attention span normal.    Lymphatic: Head/Neck- normal size and non tender to palpation. Axillary- Head and neck LN are normal size and non tender to palpation. Femoral and Inguinal- normal size and non tender to palpation.      Assessment/Plan:  Ave was seen today for hypertension.    Diagnoses and all orders for this visit:    Essential hypertension  -     lisinopril-hydrochlorothiazide (PRINZIDE,ZESTORETIC) 20-25 MG per tablet; Take 1 tablet by mouth Daily.  -     Ambulatory Referral to Cardiology    Mixed hyperlipidemia  -     lovastatin (MEVACOR) 20 MG tablet; Take 1  tablet by mouth Every Night.    Gastroesophageal reflux disease without esophagitis  -     lansoprazole (EQ LANSOPRAZOLE) 15 MG capsule; Take 1 capsule by mouth Daily.        Risks/benefits of current and new medications discussed with the patient and or family today.  The patient/family are aware and accept that if there any side effects they should call or return to clinic as soon as possible.  Appropriate F/U discussed for topics addressed today. All questions were answered to the satisfactory state of patient/family.  Should symptoms fail to improve or worsen they agree to call or return to clinic or to go to the ER. Education handouts were offered on any new Rx if requested.  Discussed the importance of following up with any needed screening tests/labs/specialist appointments and any requested follow-up recommended by me today.  Importance of maintaining follow-up discussed and patient accepts that missed appointments can delay diagnosis and potentially lead to worsening of conditions.    Return in about 1 month (around 3/12/2019) for Recheck  htn.    Yolanda Grant, DNP, APRN  2/12/2019

## 2019-02-19 ENCOUNTER — OFFICE VISIT (OUTPATIENT)
Dept: FAMILY MEDICINE CLINIC | Facility: CLINIC | Age: 64
End: 2019-02-19

## 2019-02-19 VITALS
HEIGHT: 68 IN | SYSTOLIC BLOOD PRESSURE: 149 MMHG | HEART RATE: 105 BPM | TEMPERATURE: 97.8 F | BODY MASS INDEX: 30.16 KG/M2 | RESPIRATION RATE: 18 BRPM | WEIGHT: 199 LBS | DIASTOLIC BLOOD PRESSURE: 82 MMHG | OXYGEN SATURATION: 98 %

## 2019-02-19 DIAGNOSIS — I10 ESSENTIAL HYPERTENSION: Primary | ICD-10-CM

## 2019-02-19 DIAGNOSIS — E66.09 CLASS 1 OBESITY DUE TO EXCESS CALORIES WITH SERIOUS COMORBIDITY AND BODY MASS INDEX (BMI) OF 30.0 TO 30.9 IN ADULT: ICD-10-CM

## 2019-02-19 PROCEDURE — 99213 OFFICE O/P EST LOW 20 MIN: CPT | Performed by: NURSE PRACTITIONER

## 2019-02-19 RX ORDER — LISINOPRIL 20 MG/1
20 TABLET ORAL DAILY
Qty: 90 TABLET | Refills: 1 | Status: SHIPPED | OUTPATIENT
Start: 2019-02-19 | End: 2019-03-20

## 2019-02-19 NOTE — ED NOTES
"ED Call Back Questions    1. How are you doing since leaving the Emergency Department?    Doing ok, still high blood pressure, pcp today  2. Do you have any questions about your discharge instructions? No     3. Have you filled your new prescriptions yet? N/A  a. Do you have any questions about those medications? N/A    4. Were you able to make a follow-up appointment with the physician? Yes     5. Do you have a primary care physician? Yes   a. If No, would you like for me to set you up with one? N/A  i. If Yes, “I will have our ED  give you a call right back at this number to work with you on the best time for an appointment.”    6. We are always looking to get better at what we do. Do you have any suggestions for what we can do to be even better? No   a. If Yes, \"Thank you for sharing your concerns. I apologize. I will follow up with our manager and patient . Would you like someone to call you back?\" N/A    7. Is there anything else I can do for you? No     "
General

## 2019-02-19 NOTE — PROGRESS NOTES
Subjective   Ave Singh is a 63 y.o. female here to follow up for hypertension. Her average blood pressure at home has been running around 150/80's. At her last visit her atenolol was discontinued and she began taking Lisinopril-Hctz 20/25mg.     Hypertension   This is a chronic problem. The current episode started more than 1 year ago. The problem has been gradually improving since onset. Pertinent negatives include no blurred vision, chest pain, headaches, malaise/fatigue, neck pain, palpitations or shortness of breath. There are no associated agents to hypertension. Risk factors for coronary artery disease include sedentary lifestyle and post-menopausal state. Past treatments include beta blockers. Current antihypertension treatment includes ACE inhibitors and diuretics. The current treatment provides mild improvement. There are no compliance problems.         The following portions of the patient's history were reviewed and updated as appropriate: allergies, current medications, past family history, past medical history, past social history, past surgical history and problem list.    Review of Systems   Constitutional: Negative for fatigue and malaise/fatigue.   HENT: Negative.  Negative for congestion.    Eyes: Negative for blurred vision.   Respiratory: Negative for shortness of breath.    Cardiovascular: Negative for chest pain, palpitations and leg swelling.   Gastrointestinal: Positive for GERD. Negative for vomiting and indigestion.   Genitourinary: Negative.    Musculoskeletal: Negative for neck pain.   Psychiatric/Behavioral: Negative.    All other systems reviewed and are negative.      Objective   Physical Exam   Constitutional: She is oriented to person, place, and time. She appears well-developed and well-nourished.   HENT:   Nose: Nose normal.   Eyes: Pupils are equal, round, and reactive to light.   Neck: Trachea normal. No JVD present. Carotid bruit is not present. No thyromegaly present.    Cardiovascular: Normal rate, regular rhythm, S1 normal, S2 normal, normal heart sounds, intact distal pulses and normal pulses.   Pulmonary/Chest: Effort normal and breath sounds normal. No respiratory distress.   Neurological: She is alert and oriented to person, place, and time.   Skin: Skin is warm and dry.   Psychiatric: She has a normal mood and affect. Her behavior is normal. Judgment and thought content normal.   Nursing note and vitals reviewed.        Assessment/Plan   Ave was seen today for hypertension.    Diagnoses and all orders for this visit:    Essential hypertension  -     lisinopril (PRINIVIL,ZESTRIL) 20 MG tablet; Take 1 tablet by mouth Daily.  -     Continue to take lisinopril 20 mg/hctz 25 mg daily with it    Class 1 obesity due to excess calories with serious comorbidity and body mass index (BMI) of 30.0 to 30.9 in adult    Obesity Plan:    The patient BMI is outside this range and we recommended/discussed today to utilize a diet/exercise program to get back into the appropriate range.  Federal guidelines recommend that people under the age of 65 should have a BMI of 18.5-24.9  The initial step is to document everything that is consumed into a food diary. Studies have shown that patients can lose up to 2x the weight by keeping track of foods.   Choose one bad food weekly and eliminate it from your diet.  Replace with one healthy food  Goal over next 2-4 weeks is walk 30 minutes per day 5 days per week at pace difficult to hold conversation.   Drink more water, less soda.   Cut back on portion sizes.   Today we encouraged roughly a 1 lb per week weight loss with initial goal of 5% weight loss.  Discussed if eating out for a meal, consider cutting food in half and placing into a to go container.  Individually portion any foods coming into the home based on package.  Use smaller plates  Drinking 12 oz of water 30 minutes before meal as way to suppress appetite.  Medications discussed today  include metformin, topamax, phentermine, Qsymia, Belviq (lorcaserin), Contrave (Buproprion/naltrexone).    Lifestyle therapy   Simple advice to lose weight   Internet programs or self help books.    Advice from dietitian  Set Goals that are realistic   Encouraged to use visual aides to help in measuring foods  Baseball-1 cup good for green salad, frozen yogurt, medium piece of fruit, baked potato  Handful - ½ cup good cut fruit, cooked vegetables, pasta, rice  Egg- ¼ cup good for dried fruit  Deck of cards-3 ounces good for meat and poultry  Check book-3 ounces good for grilled fish  6 dice side by side- 1 ½ ounces good for natural cheese  Simple tips   Plate method-reduce plate size to 9 inch dinner plate.  Half of plate should be filled with non-starchy vegetables (broccoli, lettuce, cauliflower, tomatoes), ¼ plate with lean source of protein (lean chicken, turkey, fish), remaining ½ with whole grains (brown rice, potato, whole grain breads  Avoid liquid calories (regular soda, juice, coffee with cream)  Focus  on water, seltzer water and other non-calorie drinks  Replace regular sugar with non-caloric sweeteners  Avoid skipping meals: plan small regular meals throughout the day in order to keep your hunger controlled  Consider using meal replacements if unable to plan a healthy meal (protein shake, high protein bar)  Replace all white bread with whole wheat/whole grain alternative  Swap regular salad dressings (mayonnaise, butter, or low fat or fat free alternative)  Avoid high fat, high calorie, high carbohydrate snakes (cookies, pastries, cakes)  Snack on fruits, low fat dairy (yogurt, cottage cheese)    Behavioral Modification  Self-Monitoring of dietary Intake-keep a dietary intake log. Obese individuals have been shown to underestimate their food intake  Behavioral treatment -gives exacts about amount and total calories  Read food labels       Calculated BEE today. Needs to eat less than 1600 calories per  day and discussed reasonable goal to realize weight loss.         Return in about 1 month (around 3/19/2019) for Recheck htn.    Yolanda Grant, DNP, APRN-BC  02/19/2019

## 2019-02-19 NOTE — PATIENT INSTRUCTIONS

## 2019-02-25 ENCOUNTER — HOSPITAL ENCOUNTER (OUTPATIENT)
Dept: MAMMOGRAPHY | Facility: HOSPITAL | Age: 64
Discharge: HOME | End: 2019-02-25
Admitting: NURSE PRACTITIONER

## 2019-02-25 PROCEDURE — 77067 SCR MAMMO BI INCL CAD: CPT

## 2019-02-25 PROCEDURE — 77063 BREAST TOMOSYNTHESIS BI: CPT

## 2019-02-26 ENCOUNTER — APPOINTMENT (OUTPATIENT)
Dept: MAMMOGRAPHY | Facility: HOSPITAL | Age: 64
End: 2019-02-26

## 2019-02-28 ENCOUNTER — TELEPHONE (OUTPATIENT)
Dept: FAMILY MEDICINE CLINIC | Facility: CLINIC | Age: 64
End: 2019-02-28

## 2019-03-13 ENCOUNTER — OFFICE VISIT (OUTPATIENT)
Dept: CARDIOLOGY | Facility: CLINIC | Age: 64
End: 2019-03-13

## 2019-03-13 VITALS
HEART RATE: 82 BPM | SYSTOLIC BLOOD PRESSURE: 150 MMHG | BODY MASS INDEX: 30.01 KG/M2 | HEIGHT: 68 IN | DIASTOLIC BLOOD PRESSURE: 88 MMHG | WEIGHT: 198 LBS | OXYGEN SATURATION: 97 %

## 2019-03-13 DIAGNOSIS — E78.2 MIXED HYPERLIPIDEMIA: ICD-10-CM

## 2019-03-13 DIAGNOSIS — R06.02 SHORTNESS OF BREATH: ICD-10-CM

## 2019-03-13 DIAGNOSIS — R06.83 SNORING: ICD-10-CM

## 2019-03-13 DIAGNOSIS — R53.83 OTHER FATIGUE: ICD-10-CM

## 2019-03-13 DIAGNOSIS — R94.31 ABNORMAL EKG: ICD-10-CM

## 2019-03-13 DIAGNOSIS — I10 ESSENTIAL HYPERTENSION: Primary | ICD-10-CM

## 2019-03-13 DIAGNOSIS — R00.2 PALPITATIONS: ICD-10-CM

## 2019-03-13 DIAGNOSIS — I44.7 LEFT BUNDLE BRANCH BLOCK: ICD-10-CM

## 2019-03-13 PROCEDURE — 99245 OFF/OP CONSLTJ NEW/EST HI 55: CPT | Performed by: NURSE PRACTITIONER

## 2019-03-13 PROCEDURE — 93000 ELECTROCARDIOGRAM COMPLETE: CPT | Performed by: NURSE PRACTITIONER

## 2019-03-13 RX ORDER — METOPROLOL SUCCINATE 25 MG/1
25 TABLET, EXTENDED RELEASE ORAL DAILY
Qty: 30 TABLET | Refills: 11 | Status: SHIPPED | OUTPATIENT
Start: 2019-03-13 | End: 2019-04-16 | Stop reason: SDUPTHER

## 2019-03-13 NOTE — PATIENT INSTRUCTIONS
"Hypertension  Hypertension, commonly called high blood pressure, is when the force of blood pumping through the arteries is too strong. The arteries are the blood vessels that carry blood from the heart throughout the body. Hypertension forces the heart to work harder to pump blood and may cause arteries to become narrow or stiff. Having untreated or uncontrolled hypertension can cause heart attacks, strokes, kidney disease, and other problems.  A blood pressure reading consists of a higher number over a lower number. Ideally, your blood pressure should be below 120/80. The first (\"top\") number is called the systolic pressure. It is a measure of the pressure in your arteries as your heart beats. The second (\"bottom\") number is called the diastolic pressure. It is a measure of the pressure in your arteries as the heart relaxes.  What are the causes?  The cause of this condition is not known.  What increases the risk?  Some risk factors for high blood pressure are under your control. Others are not.  Factors you can change  · Smoking.  · Having type 2 diabetes mellitus, high cholesterol, or both.  · Not getting enough exercise or physical activity.  · Being overweight.  · Having too much fat, sugar, calories, or salt (sodium) in your diet.  · Drinking too much alcohol.  Factors that are difficult or impossible to change  · Having chronic kidney disease.  · Having a family history of high blood pressure.  · Age. Risk increases with age.  · Race. You may be at higher risk if you are -American.  · Gender. Men are at higher risk than women before age 45. After age 65, women are at higher risk than men.  · Having obstructive sleep apnea.  · Stress.  What are the signs or symptoms?  Extremely high blood pressure (hypertensive crisis) may cause:  · Headache.  · Anxiety.  · Shortness of breath.  · Nosebleed.  · Nausea and vomiting.  · Severe chest pain.  · Jerky movements you cannot control (seizures).    How is this " diagnosed?  This condition is diagnosed by measuring your blood pressure while you are seated, with your arm resting on a surface. The cuff of the blood pressure monitor will be placed directly against the skin of your upper arm at the level of your heart. It should be measured at least twice using the same arm. Certain conditions can cause a difference in blood pressure between your right and left arms.  Certain factors can cause blood pressure readings to be lower or higher than normal (elevated) for a short period of time:  · When your blood pressure is higher when you are in a health care provider's office than when you are at home, this is called white coat hypertension. Most people with this condition do not need medicines.  · When your blood pressure is higher at home than when you are in a health care provider's office, this is called masked hypertension. Most people with this condition may need medicines to control blood pressure.    If you have a high blood pressure reading during one visit or you have normal blood pressure with other risk factors:  · You may be asked to return on a different day to have your blood pressure checked again.  · You may be asked to monitor your blood pressure at home for 1 week or longer.    If you are diagnosed with hypertension, you may have other blood or imaging tests to help your health care provider understand your overall risk for other conditions.  How is this treated?  This condition is treated by making healthy lifestyle changes, such as eating healthy foods, exercising more, and reducing your alcohol intake. Your health care provider may prescribe medicine if lifestyle changes are not enough to get your blood pressure under control, and if:  · Your systolic blood pressure is above 130.  · Your diastolic blood pressure is above 80.    Your personal target blood pressure may vary depending on your medical conditions, your age, and other factors.  Follow these  instructions at home:  Eating and drinking  · Eat a diet that is high in fiber and potassium, and low in sodium, added sugar, and fat. An example eating plan is called the DASH (Dietary Approaches to Stop Hypertension) diet. To eat this way:  ? Eat plenty of fresh fruits and vegetables. Try to fill half of your plate at each meal with fruits and vegetables.  ? Eat whole grains, such as whole wheat pasta, brown rice, or whole grain bread. Fill about one quarter of your plate with whole grains.  ? Eat or drink low-fat dairy products, such as skim milk or low-fat yogurt.  ? Avoid fatty cuts of meat, processed or cured meats, and poultry with skin. Fill about one quarter of your plate with lean proteins, such as fish, chicken without skin, beans, eggs, and tofu.  ? Avoid premade and processed foods. These tend to be higher in sodium, added sugar, and fat.  · Reduce your daily sodium intake. Most people with hypertension should eat less than 1,500 mg of sodium a day.  · Limit alcohol intake to no more than 1 drink a day for nonpregnant women and 2 drinks a day for men. One drink equals 12 oz of beer, 5 oz of wine, or 1½ oz of hard liquor.  Lifestyle  · Work with your health care provider to maintain a healthy body weight or to lose weight. Ask what an ideal weight is for you.  · Get at least 30 minutes of exercise that causes your heart to beat faster (aerobic exercise) most days of the week. Activities may include walking, swimming, or biking.  · Include exercise to strengthen your muscles (resistance exercise), such as pilates or lifting weights, as part of your weekly exercise routine. Try to do these types of exercises for 30 minutes at least 3 days a week.  · Do not use any products that contain nicotine or tobacco, such as cigarettes and e-cigarettes. If you need help quitting, ask your health care provider.  · Monitor your blood pressure at home as told by your health care provider.  · Keep all follow-up visits as  "told by your health care provider. This is important.  Medicines  · Take over-the-counter and prescription medicines only as told by your health care provider. Follow directions carefully. Blood pressure medicines must be taken as prescribed.  · Do not skip doses of blood pressure medicine. Doing this puts you at risk for problems and can make the medicine less effective.  · Ask your health care provider about side effects or reactions to medicines that you should watch for.  Contact a health care provider if:  · You think you are having a reaction to a medicine you are taking.  · You have headaches that keep coming back (recurring).  · You feel dizzy.  · You have swelling in your ankles.  · You have trouble with your vision.  Get help right away if:  · You develop a severe headache or confusion.  · You have unusual weakness or numbness.  · You feel faint.  · You have severe pain in your chest or abdomen.  · You vomit repeatedly.  · You have trouble breathing.  Summary  · Hypertension is when the force of blood pumping through your arteries is too strong. If this condition is not controlled, it may put you at risk for serious complications.  · Your personal target blood pressure may vary depending on your medical conditions, your age, and other factors. For most people, a normal blood pressure is less than 120/80.  · Hypertension is treated with lifestyle changes, medicines, or a combination of both. Lifestyle changes include weight loss, eating a healthy, low-sodium diet, exercising more, and limiting alcohol.  This information is not intended to replace advice given to you by your health care provider. Make sure you discuss any questions you have with your health care provider.  Document Released: 12/18/2006 Document Revised: 11/15/2017 Document Reviewed: 11/15/2017  [employer] Interactive Patient Education © 2019 [employer]    DASH Eating Plan  DASH stands for \"Dietary Approaches to Stop Hypertension.\" The DASH " "eating plan is a healthy eating plan that has been shown to reduce high blood pressure (hypertension). It may also reduce your risk for type 2 diabetes, heart disease, and stroke. The DASH eating plan may also help with weight loss.  What are tips for following this plan?  General guidelines  · Avoid eating more than 2,300 mg (milligrams) of salt (sodium) a day. If you have hypertension, you may need to reduce your sodium intake to 1,500 mg a day.  · Limit alcohol intake to no more than 1 drink a day for nonpregnant women and 2 drinks a day for men. One drink equals 12 oz of beer, 5 oz of wine, or 1½ oz of hard liquor.  · Work with your health care provider to maintain a healthy body weight or to lose weight. Ask what an ideal weight is for you.  · Get at least 30 minutes of exercise that causes your heart to beat faster (aerobic exercise) most days of the week. Activities may include walking, swimming, or biking.  · Work with your health care provider or diet and nutrition specialist (dietitian) to adjust your eating plan to your individual calorie needs.  Reading food labels  · Check food labels for the amount of sodium per serving. Choose foods with less than 5 percent of the Daily Value of sodium. Generally, foods with less than 300 mg of sodium per serving fit into this eating plan.  · To find whole grains, look for the word \"whole\" as the first word in the ingredient list.  Shopping  · Buy products labeled as \"low-sodium\" or \"no salt added.\"  · Buy fresh foods. Avoid canned foods and premade or frozen meals.  Cooking  · Avoid adding salt when cooking. Use salt-free seasonings or herbs instead of table salt or sea salt. Check with your health care provider or pharmacist before using salt substitutes.  · Do not vicente foods. Cook foods using healthy methods such as baking, boiling, grilling, and broiling instead.  · Cook with heart-healthy oils, such as olive, canola, soybean, or sunflower oil.  Meal " planning    · Eat a balanced diet that includes:  ? 5 or more servings of fruits and vegetables each day. At each meal, try to fill half of your plate with fruits and vegetables.  ? Up to 6-8 servings of whole grains each day.  ? Less than 6 oz of lean meat, poultry, or fish each day. A 3-oz serving of meat is about the same size as a deck of cards. One egg equals 1 oz.  ? 2 servings of low-fat dairy each day.  ? A serving of nuts, seeds, or beans 5 times each week.  ? Heart-healthy fats. Healthy fats called Omega-3 fatty acids are found in foods such as flaxseeds and coldwater fish, like sardines, salmon, and mackerel.  · Limit how much you eat of the following:  ? Canned or prepackaged foods.  ? Food that is high in trans fat, such as fried foods.  ? Food that is high in saturated fat, such as fatty meat.  ? Sweets, desserts, sugary drinks, and other foods with added sugar.  ? Full-fat dairy products.  · Do not salt foods before eating.  · Try to eat at least 2 vegetarian meals each week.  · Eat more home-cooked food and less restaurant, buffet, and fast food.  · When eating at a restaurant, ask that your food be prepared with less salt or no salt, if possible.  What foods are recommended?  The items listed may not be a complete list. Talk with your dietitian about what dietary choices are best for you.  Grains  Whole-grain or whole-wheat bread. Whole-grain or whole-wheat pasta. Brown rice. Oatmeal. Quinoa. Bulgur. Whole-grain and low-sodium cereals. Amber bread. Low-fat, low-sodium crackers. Whole-wheat flour tortillas.  Vegetables  Fresh or frozen vegetables (raw, steamed, roasted, or grilled). Low-sodium or reduced-sodium tomato and vegetable juice. Low-sodium or reduced-sodium tomato sauce and tomato paste. Low-sodium or reduced-sodium canned vegetables.  Fruits  All fresh, dried, or frozen fruit. Canned fruit in natural juice (without added sugar).  Meat and other protein foods  Skinless chicken or turkey.  Ground chicken or turkey. Pork with fat trimmed off. Fish and seafood. Egg whites. Dried beans, peas, or lentils. Unsalted nuts, nut butters, and seeds. Unsalted canned beans. Lean cuts of beef with fat trimmed off. Low-sodium, lean deli meat.  Dairy  Low-fat (1%) or fat-free (skim) milk. Fat-free, low-fat, or reduced-fat cheeses. Nonfat, low-sodium ricotta or cottage cheese. Low-fat or nonfat yogurt. Low-fat, low-sodium cheese.  Fats and oils  Soft margarine without trans fats. Vegetable oil. Low-fat, reduced-fat, or light mayonnaise and salad dressings (reduced-sodium). Canola, safflower, olive, soybean, and sunflower oils. Avocado.  Seasoning and other foods  Herbs. Spices. Seasoning mixes without salt. Unsalted popcorn and pretzels. Fat-free sweets.  What foods are not recommended?  The items listed may not be a complete list. Talk with your dietitian about what dietary choices are best for you.  Grains  Baked goods made with fat, such as croissants, muffins, or some breads. Dry pasta or rice meal packs.  Vegetables  Creamed or fried vegetables. Vegetables in a cheese sauce. Regular canned vegetables (not low-sodium or reduced-sodium). Regular canned tomato sauce and paste (not low-sodium or reduced-sodium). Regular tomato and vegetable juice (not low-sodium or reduced-sodium). Pickles. Olives.  Fruits  Canned fruit in a light or heavy syrup. Fried fruit. Fruit in cream or butter sauce.  Meat and other protein foods  Fatty cuts of meat. Ribs. Fried meat. Villatoro. Sausage. Bologna and other processed lunch meats. Salami. Fatback. Hotdogs. Bratwurst. Salted nuts and seeds. Canned beans with added salt. Canned or smoked fish. Whole eggs or egg yolks. Chicken or turkey with skin.  Dairy  Whole or 2% milk, cream, and half-and-half. Whole or full-fat cream cheese. Whole-fat or sweetened yogurt. Full-fat cheese. Nondairy creamers. Whipped toppings. Processed cheese and cheese spreads.  Fats and oils  Butter. Stick  margarine. Lard. Shortening. Ghee. Villatoro fat. Tropical oils, such as coconut, palm kernel, or palm oil.  Seasoning and other foods  Salted popcorn and pretzels. Onion salt, garlic salt, seasoned salt, table salt, and sea salt. Worcestershire sauce. Tartar sauce. Barbecue sauce. Teriyaki sauce. Soy sauce, including reduced-sodium. Steak sauce. Canned and packaged gravies. Fish sauce. Oyster sauce. Cocktail sauce. Horseradish that you find on the shelf. Ketchup. Mustard. Meat flavorings and tenderizers. Bouillon cubes. Hot sauce and Tabasco sauce. Premade or packaged marinades. Premade or packaged taco seasonings. Relishes. Regular salad dressings.  Where to find more information:  · National Heart, Lung, and Blood [place]: www.nhlbi.nih.gov  · American Heart Association: www.heart.org  Summary  · The DASH eating plan is a healthy eating plan that has been shown to reduce high blood pressure (hypertension). It may also reduce your risk for type 2 diabetes, heart disease, and stroke.  · With the DASH eating plan, you should limit salt (sodium) intake to 2,300 mg a day. If you have hypertension, you may need to reduce your sodium intake to 1,500 mg a day.  · When on the DASH eating plan, aim to eat more fresh fruits and vegetables, whole grains, lean proteins, low-fat dairy, and heart-healthy fats.  · Work with your health care provider or diet and nutrition specialist (dietitian) to adjust your eating plan to your individual calorie needs.  This information is not intended to replace advice given to you by your health care provider. Make sure you discuss any questions you have with your health care provider.  Document Released: 12/06/2012 Document Revised: 12/11/2017 Document Reviewed: 12/11/2017  [employer] Patient Education © 2019 [employer]

## 2019-03-13 NOTE — ASSESSMENT & PLAN NOTE
Improved but not quite to goal. Given palpitations will add Toprol 25 mg daily for optimal BP control. Goal BP less than 130/80. If remains uncontrolled can increase Toprol or change lisinopril to ARB. Could also add calcium channel blocker. Encouraged routine aerobic exercise, healthy (DASH) diet, and will evaluate for possible sleep apnea. Discussed importance of adequate BP control in preventing stroke, heart and renal failure.

## 2019-03-13 NOTE — ASSESSMENT & PLAN NOTE
Fair control on lovastatin. Consider changing to atorvastatin if HDL remains less than 50. Encouraged routine aerobic exercise.

## 2019-03-13 NOTE — PROGRESS NOTES
Subjective:     Encounter Date:03/13/2019    Chief Complaint:    Patient ID: Ave Singh is a 63 y.o. female here today for cardiac evaluation at the request of Andry Grant DNP. She is accompanied by her .    Went to walk in clinic at [facility] with a sinus infection and had an irregular heart beat in January. Had f/u last month with Andry and BP was elevated. EKG was abnormal (new L BBB) and Andry sent her to ER. She had a stress echocardiogram that was low risk for ischemia. Has had trouble getting BP controlled. Had been on atenolol for years. Used to be controlled in the 120/80s. Has gained some weight gradually and BP had gotten up to 170/90-100s. Has occasional lightheadeness that has improved with better BP control. Atenolol stopped and lisinopril HCTZ started and increased last month. Now running 120 (while standing) to 140s (sitting)/70-80s. Heart rates now 70-90s. Had been taking sudafed and Nyquil.       Hypertension   This is a new problem. The current episode started more than 1 year ago. The problem has been gradually improving since onset. The problem is uncontrolled. Associated symptoms include malaise/fatigue. Pertinent negatives include no blurred vision, chest pain, headaches, neck pain, palpitations or shortness of breath. Agents associated with hypertension include decongestants and NSAIDs (rarely takes naproxen). Risk factors for coronary artery disease include dyslipidemia, obesity, post-menopausal state and sedentary lifestyle. Past treatments include beta blockers. Current antihypertension treatment includes ACE inhibitors and diuretics. The current treatment provides moderate improvement. Compliance problems include exercise.  There is no history of angina, CAD/MI, CVA or heart failure. Identifiable causes of hypertension include a hypertension causing med. hasn't been checked for sleep apnea.     HPI     Hypertension      Additional comments: REFERRAL FROM ANDRY  LESLEE.  STRESS ECHO DONE 2/11/19          Last edited by Frida Spangler MA on 3/13/2019  1:00 PM. (History)        History:   Past Medical History:   Diagnosis Date   • Abnormal EKG 3/13/2019   • GERD (gastroesophageal reflux disease)    • Herpes simplex    • Hyperlipidemia    • Hypertension    • Left bundle branch block 3/13/2019     Past Surgical History:   Procedure Laterality Date   • APPENDECTOMY  08/2008   • HYSTERECTOMY     • LASIK  2005     Social History     Socioeconomic History   • Marital status:      Spouse name: Not on file   • Number of children: Not on file   • Years of education: Not on file   • Highest education level: Not on file   Social Needs   • Financial resource strain: Not on file   • Food insecurity - worry: Not on file   • Food insecurity - inability: Not on file   • Transportation needs - medical: Not on file   • Transportation needs - non-medical: Not on file   Occupational History   • Not on file   Tobacco Use   • Smoking status: Never Smoker   • Smokeless tobacco: Never Used   Substance and Sexual Activity   • Alcohol use: No     Comment: very rarely   • Drug use: No   • Sexual activity: Defer   Other Topics Concern   • Not on file   Social History Narrative   • Not on file     Family History   Problem Relation Age of Onset   • Cancer Mother    • Breast cancer Mother    • Heart disease Father    • Heart attack Father 62   • Hypertension Brother    • Cancer Maternal Aunt    • Cancer Maternal Grandmother        Outpatient Medications Marked as Taking for the 3/13/19 encounter (Office Visit) with Melina Stein APRN   Medication Sig Dispense Refill   • aspirin 81 MG chewable tablet Chew 81 mg Daily.     • Calcium Carb-Cholecalciferol (LIQUID CALCIUM WITH D3) 600-500 MG-UNIT capsule Take 2 tablets by mouth Daily.     • lansoprazole (EQ LANSOPRAZOLE) 15 MG capsule Take 1 capsule by mouth Daily. 180 capsule 0   • lisinopril (PRINIVIL,ZESTRIL) 20 MG tablet Take 1 tablet by  mouth Daily. 90 tablet 1   • lisinopril-hydrochlorothiazide (PRINZIDE,ZESTORETIC) 20-25 MG per tablet Take 1 tablet by mouth Daily. 90 tablet 0   • lovastatin (MEVACOR) 20 MG tablet Take 1 tablet by mouth Every Night. 90 tablet 1   • valACYclovir (VALTREX) 500 MG tablet Take 2 tablets by mouth 2 (Two) Times a Day. (Patient taking differently: Take 1,000 mg by mouth 2 (Two) Times a Day As Needed (fever blisters).) 60 tablet 5   • vitamin B-12 (CYANOCOBALAMIN) 1000 MCG tablet Take 1,000 mcg by mouth Daily.     • [DISCONTINUED] lansoprazole (PREVACID) 15 MG capsule Take 2 capsules by mouth Daily. (Patient taking differently: Take 30 mg by mouth Daily. 1 DAILY) 20 capsule 0     Review of Systems:  Review of Systems   Constitution: Positive for malaise/fatigue. Negative for chills, decreased appetite, fever and weakness.   HENT: Negative for congestion and nosebleeds.    Eyes: Negative for blurred vision and double vision.   Cardiovascular: Positive for dyspnea on exertion and irregular heartbeat (ONE EPISODE OF HEART RACING). Negative for chest pain, leg swelling, near-syncope, palpitations and syncope.   Respiratory: Positive for snoring. Negative for cough, shortness of breath and sputum production.    Endocrine: Negative for cold intolerance and heat intolerance.   Skin: Positive for rash (ON HER BACK.  STARTED AFTER MED CHANGE.). Negative for dry skin.   Musculoskeletal: Negative for back pain, joint pain, joint swelling, muscle cramps and neck pain.   Gastrointestinal: Positive for heartburn. Negative for abdominal pain, constipation, diarrhea, nausea (IN THE MORNINGS) and vomiting.   Neurological: Positive for light-headedness. Negative for excessive daytime sleepiness, dizziness, headaches and loss of balance.   Psychiatric/Behavioral: Negative for depression. The patient does not have insomnia and is not nervous/anxious.             Objective:   /88 (BP Location: Left arm, Patient Position: Sitting, Cuff  "Size: Adult)   Pulse 82   Ht 172.7 cm (67.99\")   Wt 89.8 kg (198 lb)   SpO2 97%   BMI 30.11 kg/m²   Wt Readings from Last 3 Encounters:   03/13/19 89.8 kg (198 lb)   02/19/19 90.3 kg (199 lb)   02/12/19 89 kg (196 lb 3.2 oz)         Physical Exam   Constitutional: She is oriented to person, place, and time. She appears well-developed and well-nourished.   HENT:   Head: Normocephalic and atraumatic.   Right Ear: External ear normal.   Left Ear: External ear normal.   Nose: Nose normal.   Mouth/Throat: Oropharynx is clear and moist.   Eyes: Conjunctivae and EOM are normal. Pupils are equal, round, and reactive to light. Right eye exhibits no discharge. Left eye exhibits no discharge. No scleral icterus.   Neck: Normal range of motion. Neck supple. No JVD present. No tracheal deviation present. No thyromegaly present.   Cardiovascular: Normal rate and regular rhythm. Exam reveals distant heart sounds. Exam reveals no gallop and no friction rub.   No murmur heard.  Pulmonary/Chest: Effort normal and breath sounds normal. She has no wheezes. She has no rales.   Abdominal: Soft. Bowel sounds are normal. She exhibits no mass. There is no tenderness. There is no rebound and no guarding.   Mild obesity   Musculoskeletal: She exhibits no edema, tenderness or deformity.   Lymphadenopathy:     She has no cervical adenopathy.   Neurological: She is alert and oriented to person, place, and time. No cranial nerve deficit.   Skin: Skin is warm and dry.   Psychiatric: She has a normal mood and affect. Her behavior is normal. Judgment and thought content normal.   Vitals reviewed.      Lab/Diagnostics Review:   2/11/2019 Stress Echocardiogram  GOOD STAMINA WITHOUT CHEST PAIN  NORMAL HEMODYNAMICS AND RHYTHM  EKG IS UNINTERPRETABLE - LBBB  NO WALL MOTION ABNORMALITIES     **LOW RISK FOR ISCHEMIA**    Lab Results   Component Value Date    WBC 6.99 02/11/2019    HGB 13.6 02/11/2019    HCT 40.9 02/11/2019    MCV 84.2 02/11/2019    "  02/11/2019     Lab Results   Component Value Date    GLUCOSE 100 02/11/2019    BUN 15 02/11/2019    CREATININE 0.54 02/11/2019    EGFRIFNONA 114 02/11/2019    EGFRIFAFRI 95 10/23/2018    BCR 27.8 (H) 02/11/2019    K 3.7 02/11/2019    CO2 29.0 02/11/2019    CALCIUM 9.4 02/11/2019    PROTENTOTREF 7.6 10/23/2018    ALBUMIN 4.70 02/11/2019    LABIL2 1.5 10/23/2018    AST 65 (H) 02/11/2019    ALT 70 (H) 02/11/2019     Lab Results   Component Value Date    CHLPL 142 10/23/2018    TRIG 178 (H) 10/23/2018    HDL 35 (L) 10/23/2018    LDL 71 10/23/2018     10/23/2019 Vitamin D 62.8    2/11/2019 EKG   Normal sinus rhythm  Left axis deviation  Left bundle branch block  Abnormal ECG  When compared with ECG of 11-FEB-2019 11:28,  No significant change was found Although rate has increased    Referred By:  RASHIDA           Confirmed By:Teodoro Murphy MD    ECG 12 Lead  Date/Time: 3/13/2019 1:33 PM  Performed by: Melina Stein APRN  Authorized by: Melina Stein APRN   Comparison: compared with previous ECG from 2/11/2019  Comparison to previous ECG: Sinus arrhythmia is new  Rhythm: sinus rhythm  Rhythm comments: with sinus arrhythmia  Rate: normal  BPM: 71  Conduction: left bundle branch block  QRS axis: left    Clinical impression: abnormal EKG                Assessment/Plan:         Problem List Items Addressed This Visit        Cardiovascular and Mediastinum    Hypertension - Primary    Current Assessment & Plan     Improved but not quite to goal. Given palpitations will add Toprol 25 mg daily for optimal BP control. Goal BP less than 130/80. If remains uncontrolled can increase Toprol or change lisinopril to ARB. Could also add calcium channel blocker. Encouraged routine aerobic exercise, healthy (DASH) diet, and will evaluate for possible sleep apnea. Discussed importance of adequate BP control in preventing stroke, heart and renal failure.          Relevant Medications    metoprolol succinate XL  (TOPROL-XL) 25 MG 24 hr tablet    Other Relevant Orders    ECG 12 Lead    Basic Metabolic Panel    Adult Transthoracic Echo Complete W/ Cont if Necessary Per Protocol    Hyperlipidemia    Current Assessment & Plan     Fair control on lovastatin. Consider changing to atorvastatin if HDL remains less than 50. Encouraged routine aerobic exercise.          Abnormal EKG    Relevant Orders    Adult Transthoracic Echo Complete W/ Cont if Necessary Per Protocol    Palpitations    Relevant Orders    Adult Transthoracic Echo Complete W/ Cont if Necessary Per Protocol    Left bundle branch block    Overview     2/11/2019 SE low risk for ischemia         Current Assessment & Plan     Chronic without ischemia         Relevant Medications    metoprolol succinate XL (TOPROL-XL) 25 MG 24 hr tablet       Respiratory    Snoring    Current Assessment & Plan     Possible sleep apnea. Check overnight oximetry.          Relevant Orders    Overnight Sleep Oximetry Study    Shortness of breath    Current Assessment & Plan     Check echocardiogram and overnight oximetry.          Relevant Orders    Overnight Sleep Oximetry Study    Adult Transthoracic Echo Complete W/ Cont if Necessary Per Protocol       Other    Other fatigue    Current Assessment & Plan     Screen for sleep apnea. Vit D and TSH normal.          Relevant Orders    Overnight Sleep Oximetry Study        Return in about 6 months (around 9/13/2019) for Recheck.           Melina Stein APRN, ACNP-BC, CHFN-BC

## 2019-03-18 ENCOUNTER — OFFICE VISIT (OUTPATIENT)
Dept: FAMILY MEDICINE CLINIC | Facility: CLINIC | Age: 64
End: 2019-03-18

## 2019-03-18 ENCOUNTER — RESULTS ENCOUNTER (OUTPATIENT)
Dept: CARDIOLOGY | Facility: CLINIC | Age: 64
End: 2019-03-18

## 2019-03-18 VITALS
BODY MASS INDEX: 29.64 KG/M2 | DIASTOLIC BLOOD PRESSURE: 80 MMHG | OXYGEN SATURATION: 98 % | SYSTOLIC BLOOD PRESSURE: 159 MMHG | HEART RATE: 67 BPM | TEMPERATURE: 97.8 F | HEIGHT: 68 IN | RESPIRATION RATE: 18 BRPM | WEIGHT: 195.6 LBS

## 2019-03-18 DIAGNOSIS — I10 ESSENTIAL HYPERTENSION: Primary | ICD-10-CM

## 2019-03-18 DIAGNOSIS — E78.2 MIXED HYPERLIPIDEMIA: ICD-10-CM

## 2019-03-18 DIAGNOSIS — I10 ESSENTIAL HYPERTENSION: ICD-10-CM

## 2019-03-18 PROCEDURE — 99213 OFFICE O/P EST LOW 20 MIN: CPT | Performed by: NURSE PRACTITIONER

## 2019-03-18 NOTE — PATIENT INSTRUCTIONS
"  Goal:  >130/85    Hypertension  Hypertension, commonly called high blood pressure, is when the force of blood pumping through the arteries is too strong. The arteries are the blood vessels that carry blood from the heart throughout the body. Hypertension forces the heart to work harder to pump blood and may cause arteries to become narrow or stiff. Having untreated or uncontrolled hypertension can cause heart attacks, strokes, kidney disease, and other problems.  A blood pressure reading consists of a higher number over a lower number. Ideally, your blood pressure should be below 120/80. The first (\"top\") number is called the systolic pressure. It is a measure of the pressure in your arteries as your heart beats. The second (\"bottom\") number is called the diastolic pressure. It is a measure of the pressure in your arteries as the heart relaxes.  What are the causes?  The cause of this condition is not known.  What increases the risk?  Some risk factors for high blood pressure are under your control. Others are not.  Factors you can change  · Smoking.  · Having type 2 diabetes mellitus, high cholesterol, or both.  · Not getting enough exercise or physical activity.  · Being overweight.  · Having too much fat, sugar, calories, or salt (sodium) in your diet.  · Drinking too much alcohol.  Factors that are difficult or impossible to change  · Having chronic kidney disease.  · Having a family history of high blood pressure.  · Age. Risk increases with age.  · Race. You may be at higher risk if you are -American.  · Gender. Men are at higher risk than women before age 45. After age 65, women are at higher risk than men.  · Having obstructive sleep apnea.  · Stress.  What are the signs or symptoms?  Extremely high blood pressure (hypertensive crisis) may cause:  · Headache.  · Anxiety.  · Shortness of breath.  · Nosebleed.  · Nausea and vomiting.  · Severe chest pain.  · Jerky movements you cannot control " (seizures).    How is this diagnosed?  This condition is diagnosed by measuring your blood pressure while you are seated, with your arm resting on a surface. The cuff of the blood pressure monitor will be placed directly against the skin of your upper arm at the level of your heart. It should be measured at least twice using the same arm. Certain conditions can cause a difference in blood pressure between your right and left arms.  Certain factors can cause blood pressure readings to be lower or higher than normal (elevated) for a short period of time:  · When your blood pressure is higher when you are in a health care provider's office than when you are at home, this is called white coat hypertension. Most people with this condition do not need medicines.  · When your blood pressure is higher at home than when you are in a health care provider's office, this is called masked hypertension. Most people with this condition may need medicines to control blood pressure.    If you have a high blood pressure reading during one visit or you have normal blood pressure with other risk factors:  · You may be asked to return on a different day to have your blood pressure checked again.  · You may be asked to monitor your blood pressure at home for 1 week or longer.    If you are diagnosed with hypertension, you may have other blood or imaging tests to help your health care provider understand your overall risk for other conditions.  How is this treated?  This condition is treated by making healthy lifestyle changes, such as eating healthy foods, exercising more, and reducing your alcohol intake. Your health care provider may prescribe medicine if lifestyle changes are not enough to get your blood pressure under control, and if:  · Your systolic blood pressure is above 130.  · Your diastolic blood pressure is above 80.    Your personal target blood pressure may vary depending on your medical conditions, your age, and other  factors.  Follow these instructions at home:  Eating and drinking  · Eat a diet that is high in fiber and potassium, and low in sodium, added sugar, and fat. An example eating plan is called the DASH (Dietary Approaches to Stop Hypertension) diet. To eat this way:  ? Eat plenty of fresh fruits and vegetables. Try to fill half of your plate at each meal with fruits and vegetables.  ? Eat whole grains, such as whole wheat pasta, brown rice, or whole grain bread. Fill about one quarter of your plate with whole grains.  ? Eat or drink low-fat dairy products, such as skim milk or low-fat yogurt.  ? Avoid fatty cuts of meat, processed or cured meats, and poultry with skin. Fill about one quarter of your plate with lean proteins, such as fish, chicken without skin, beans, eggs, and tofu.  ? Avoid premade and processed foods. These tend to be higher in sodium, added sugar, and fat.  · Reduce your daily sodium intake. Most people with hypertension should eat less than 1,500 mg of sodium a day.  · Limit alcohol intake to no more than 1 drink a day for nonpregnant women and 2 drinks a day for men. One drink equals 12 oz of beer, 5 oz of wine, or 1½ oz of hard liquor.  Lifestyle  · Work with your health care provider to maintain a healthy body weight or to lose weight. Ask what an ideal weight is for you.  · Get at least 30 minutes of exercise that causes your heart to beat faster (aerobic exercise) most days of the week. Activities may include walking, swimming, or biking.  · Include exercise to strengthen your muscles (resistance exercise), such as pilates or lifting weights, as part of your weekly exercise routine. Try to do these types of exercises for 30 minutes at least 3 days a week.  · Do not use any products that contain nicotine or tobacco, such as cigarettes and e-cigarettes. If you need help quitting, ask your health care provider.  · Monitor your blood pressure at home as told by your health care provider.  · Keep  all follow-up visits as told by your health care provider. This is important.  Medicines  · Take over-the-counter and prescription medicines only as told by your health care provider. Follow directions carefully. Blood pressure medicines must be taken as prescribed.  · Do not skip doses of blood pressure medicine. Doing this puts you at risk for problems and can make the medicine less effective.  · Ask your health care provider about side effects or reactions to medicines that you should watch for.  Contact a health care provider if:  · You think you are having a reaction to a medicine you are taking.  · You have headaches that keep coming back (recurring).  · You feel dizzy.  · You have swelling in your ankles.  · You have trouble with your vision.  Get help right away if:  · You develop a severe headache or confusion.  · You have unusual weakness or numbness.  · You feel faint.  · You have severe pain in your chest or abdomen.  · You vomit repeatedly.  · You have trouble breathing.  Summary  · Hypertension is when the force of blood pumping through your arteries is too strong. If this condition is not controlled, it may put you at risk for serious complications.  · Your personal target blood pressure may vary depending on your medical conditions, your age, and other factors. For most people, a normal blood pressure is less than 120/80.  · Hypertension is treated with lifestyle changes, medicines, or a combination of both. Lifestyle changes include weight loss, eating a healthy, low-sodium diet, exercising more, and limiting alcohol.  This information is not intended to replace advice given to you by your health care provider. Make sure you discuss any questions you have with your health care provider.  Document Released: 12/18/2006 Document Revised: 11/15/2017 Document Reviewed: 11/15/2017  [employer] Interactive Patient Education © 2019 [employer]

## 2019-03-19 ENCOUNTER — TELEPHONE (OUTPATIENT)
Dept: CARDIOLOGY | Facility: CLINIC | Age: 64
End: 2019-03-19

## 2019-03-19 DIAGNOSIS — E78.2 MIXED HYPERLIPIDEMIA: Primary | ICD-10-CM

## 2019-03-19 LAB
BUN SERPL-MCNC: 24 MG/DL (ref 5–21)
BUN/CREAT SERPL: 37.5 (ref 7–25)
CALCIUM SERPL-MCNC: 9.9 MG/DL (ref 8.4–10.4)
CHLORIDE SERPL-SCNC: 100 MMOL/L (ref 98–110)
CHOLEST SERPL-MCNC: 190 MG/DL (ref 130–200)
CO2 SERPL-SCNC: 29 MMOL/L (ref 24–31)
CREAT SERPL-MCNC: 0.64 MG/DL (ref 0.5–1.4)
GLUCOSE SERPL-MCNC: 104 MG/DL (ref 70–100)
HDLC SERPL-MCNC: 32 MG/DL
LDLC SERPL CALC-MCNC: 115 MG/DL (ref 0–99)
POTASSIUM SERPL-SCNC: 4 MMOL/L (ref 3.5–5.3)
SODIUM SERPL-SCNC: 139 MMOL/L (ref 135–145)
TRIGL SERPL-MCNC: 213 MG/DL (ref 0–149)
VLDLC SERPL CALC-MCNC: 42.6 MG/DL

## 2019-03-19 NOTE — PROGRESS NOTES
Please notify patient she is tolerating addition of lisinopril. Kidney function and electrolytes are good. Glucose is borderline. Recommend she discuss with Yolanda at visit next month and limit sweets and simple carbs (sugar, soda, white bread, cakes, cookies, etc).  TX!

## 2019-03-19 NOTE — TELEPHONE ENCOUNTER
----- Message from MARY Ball sent at 3/19/2019 10:14 AM CDT -----  Please notify patient she is tolerating addition of lisinopril. Kidney function and electrolytes are good. Glucose is borderline. Recommend she discuss with Andry at visit next month and limit sweets and simple carbs (sugar, soda, white bread, cakes, cookies, etc).  TX!    CALLED INFORMED PT OF LAB RESULTS AND THAT SHE NEEDED TO DISCUSS GLUCOSE LEVEL AT NEXT MONTHS APPT WITH ANDRY CAMILO AND TO LIMIT HER SWEETS AND SIMPLE CARBS PT VOICED UNDERSTANDING

## 2019-03-20 ENCOUNTER — TELEPHONE (OUTPATIENT)
Dept: FAMILY MEDICINE CLINIC | Facility: CLINIC | Age: 64
End: 2019-03-20

## 2019-03-20 DIAGNOSIS — I10 ESSENTIAL HYPERTENSION: Primary | ICD-10-CM

## 2019-03-20 RX ORDER — LOSARTAN POTASSIUM AND HYDROCHLOROTHIAZIDE 25; 100 MG/1; MG/1
1 TABLET ORAL DAILY
Qty: 30 TABLET | Refills: 0 | Status: SHIPPED | OUTPATIENT
Start: 2019-03-20 | End: 2019-04-16 | Stop reason: SDUPTHER

## 2019-03-20 NOTE — TELEPHONE ENCOUNTER
Please tell pt to stop the lisinopril and lisinopril/Hctz.  Start losartan 100-25 mg daily.  Keep record of bp. Follow up 1 month

## 2019-03-20 NOTE — TELEPHONE ENCOUNTER
PT called with her BP readings since Mon.  Mon pm 136/79, Tues am 140/83, pm 120/64, Weds am 132/74.    Said that you were thinking about changing her BP med.

## 2019-03-22 ENCOUNTER — TELEPHONE (OUTPATIENT)
Dept: CARDIOLOGY | Facility: CLINIC | Age: 64
End: 2019-03-22

## 2019-03-22 ENCOUNTER — HOSPITAL ENCOUNTER (OUTPATIENT)
Dept: CARDIOLOGY | Facility: HOSPITAL | Age: 64
Discharge: HOME | End: 2019-03-22
Admitting: NURSE PRACTITIONER

## 2019-03-22 VITALS
BODY MASS INDEX: 29.64 KG/M2 | HEIGHT: 68 IN | WEIGHT: 195.55 LBS | SYSTOLIC BLOOD PRESSURE: 158 MMHG | DIASTOLIC BLOOD PRESSURE: 76 MMHG

## 2019-03-22 DIAGNOSIS — R06.02 SHORTNESS OF BREATH: ICD-10-CM

## 2019-03-22 DIAGNOSIS — R06.83 SNORING: ICD-10-CM

## 2019-03-22 DIAGNOSIS — R53.83 OTHER FATIGUE: ICD-10-CM

## 2019-03-22 LAB
BH CV ECHO MEAS - AO MAX PG (FULL): 1.4 MMHG
BH CV ECHO MEAS - AO MAX PG: 5.9 MMHG
BH CV ECHO MEAS - AO MEAN PG (FULL): 1 MMHG
BH CV ECHO MEAS - AO MEAN PG: 3 MMHG
BH CV ECHO MEAS - AO ROOT AREA (BSA CORRECTED): 1.4
BH CV ECHO MEAS - AO ROOT AREA: 6.6 CM^2
BH CV ECHO MEAS - AO ROOT DIAM: 2.9 CM
BH CV ECHO MEAS - AO V2 MAX: 121 CM/SEC
BH CV ECHO MEAS - AO V2 MEAN: 86.8 CM/SEC
BH CV ECHO MEAS - AO V2 VTI: 26.8 CM
BH CV ECHO MEAS - AVA(I,A): 3.1 CM^2
BH CV ECHO MEAS - AVA(I,D): 3.1 CM^2
BH CV ECHO MEAS - AVA(V,A): 2.7 CM^2
BH CV ECHO MEAS - AVA(V,D): 2.7 CM^2
BH CV ECHO MEAS - BSA(HAYCOCK): 2.1 M^2
BH CV ECHO MEAS - BSA: 2 M^2
BH CV ECHO MEAS - BZI_BMI: 29.6 KILOGRAMS/M^2
BH CV ECHO MEAS - BZI_METRIC_HEIGHT: 172.7 CM
BH CV ECHO MEAS - BZI_METRIC_WEIGHT: 88.5 KG
BH CV ECHO MEAS - EDV(CUBED): 89.9 ML
BH CV ECHO MEAS - EDV(MOD-SP4): 69.4 ML
BH CV ECHO MEAS - EDV(TEICH): 91.5 ML
BH CV ECHO MEAS - EF(CUBED): 71.4 %
BH CV ECHO MEAS - EF(MOD-SP4): 61 %
BH CV ECHO MEAS - EF(TEICH): 63.3 %
BH CV ECHO MEAS - ESV(CUBED): 25.7 ML
BH CV ECHO MEAS - ESV(MOD-SP4): 27.1 ML
BH CV ECHO MEAS - ESV(TEICH): 33.6 ML
BH CV ECHO MEAS - FS: 34.2 %
BH CV ECHO MEAS - IVS/LVPW: 1
BH CV ECHO MEAS - IVSD: 0.88 CM
BH CV ECHO MEAS - LA DIMENSION: 3 CM
BH CV ECHO MEAS - LA/AO: 1
BH CV ECHO MEAS - LAT PEAK E' VEL: 6.2 CM/SEC
BH CV ECHO MEAS - LV DIASTOLIC VOL/BSA (35-75): 34.3 ML/M^2
BH CV ECHO MEAS - LV MASS(C)D: 126.5 GRAMS
BH CV ECHO MEAS - LV MASS(C)DI: 62.6 GRAMS/M^2
BH CV ECHO MEAS - LV MAX PG: 4.4 MMHG
BH CV ECHO MEAS - LV MEAN PG: 2 MMHG
BH CV ECHO MEAS - LV SYSTOLIC VOL/BSA (12-30): 13.4 ML/M^2
BH CV ECHO MEAS - LV V1 MAX: 105 CM/SEC
BH CV ECHO MEAS - LV V1 MEAN: 58.8 CM/SEC
BH CV ECHO MEAS - LV V1 VTI: 26.2 CM
BH CV ECHO MEAS - LVIDD: 4.5 CM
BH CV ECHO MEAS - LVIDS: 3 CM
BH CV ECHO MEAS - LVLD AP4: 7.8 CM
BH CV ECHO MEAS - LVLS AP4: 6.3 CM
BH CV ECHO MEAS - LVOT AREA (M): 3.1 CM^2
BH CV ECHO MEAS - LVOT AREA: 3.1 CM^2
BH CV ECHO MEAS - LVOT DIAM: 2 CM
BH CV ECHO MEAS - LVPWD: 0.87 CM
BH CV ECHO MEAS - MED PEAK E' VEL: 6.09 CM/SEC
BH CV ECHO MEAS - MV A MAX VEL: 101 CM/SEC
BH CV ECHO MEAS - MV DEC TIME: 0.27 SEC
BH CV ECHO MEAS - MV E MAX VEL: 65 CM/SEC
BH CV ECHO MEAS - MV E/A: 0.64
BH CV ECHO MEAS - SI(AO): 87.5 ML/M^2
BH CV ECHO MEAS - SI(CUBED): 31.8 ML/M^2
BH CV ECHO MEAS - SI(LVOT): 40.7 ML/M^2
BH CV ECHO MEAS - SI(MOD-SP4): 20.9 ML/M^2
BH CV ECHO MEAS - SI(TEICH): 28.6 ML/M^2
BH CV ECHO MEAS - SV(AO): 177 ML
BH CV ECHO MEAS - SV(CUBED): 64.2 ML
BH CV ECHO MEAS - SV(LVOT): 82.3 ML
BH CV ECHO MEAS - SV(MOD-SP4): 42.3 ML
BH CV ECHO MEAS - SV(TEICH): 57.9 ML
BH CV ECHO MEASUREMENTS AVERAGE E/E' RATIO: 10.58
LEFT ATRIUM VOLUME INDEX: 14.2 ML/M2
LEFT ATRIUM VOLUME: 28.6 CM3
MAXIMAL PREDICTED HEART RATE: 156 BPM
STRESS TARGET HR: 133 BPM

## 2019-03-22 PROCEDURE — 93306 TTE W/DOPPLER COMPLETE: CPT

## 2019-03-22 PROCEDURE — 93306 TTE W/DOPPLER COMPLETE: CPT | Performed by: INTERNAL MEDICINE

## 2019-03-22 NOTE — TELEPHONE ENCOUNTER
----- Message from MARY Ball sent at 3/22/2019  1:44 PM CDT -----  Please notify patient of normal echocardiogram. TX!    CALLED PT INFORMED HER OF NORMAL ECHO PT VOICED UNDERSTANDING

## 2019-03-22 NOTE — PROGRESS NOTES
Please notify patient of abnormal STEPHEN. Recommend referral to sleep specialist for possible sleep study. Let me know if agreeable and I'll order referral. TX!

## 2019-04-02 ENCOUNTER — TELEPHONE (OUTPATIENT)
Dept: FAMILY MEDICINE CLINIC | Facility: CLINIC | Age: 64
End: 2019-04-02

## 2019-04-02 NOTE — TELEPHONE ENCOUNTER
Pt called and states her BP is still elevated   BP readings from this morning back thru the weekend.   139/79,137/73,146/80,152/81,156/81,150/77,    148/76

## 2019-04-03 DIAGNOSIS — I10 ESSENTIAL HYPERTENSION: ICD-10-CM

## 2019-04-05 NOTE — TELEPHONE ENCOUNTER
I spoke to pt today about her bp and she is going to add lisinopril 20 mg daily, to the losartan HCTZ.   Pt has some at home and will take it and will call next week and let me know how her bp is running

## 2019-04-16 ENCOUNTER — OFFICE VISIT (OUTPATIENT)
Dept: FAMILY MEDICINE CLINIC | Facility: CLINIC | Age: 64
End: 2019-04-16

## 2019-04-16 VITALS
HEART RATE: 73 BPM | BODY MASS INDEX: 29.28 KG/M2 | OXYGEN SATURATION: 96 % | SYSTOLIC BLOOD PRESSURE: 147 MMHG | DIASTOLIC BLOOD PRESSURE: 75 MMHG | TEMPERATURE: 97.6 F | RESPIRATION RATE: 18 BRPM | WEIGHT: 193.2 LBS | HEIGHT: 68 IN

## 2019-04-16 VITALS
RESPIRATION RATE: 18 BRPM | DIASTOLIC BLOOD PRESSURE: 80 MMHG | HEIGHT: 68 IN | SYSTOLIC BLOOD PRESSURE: 124 MMHG | HEART RATE: 73 BPM | OXYGEN SATURATION: 96 % | WEIGHT: 193.2 LBS | BODY MASS INDEX: 29.28 KG/M2 | TEMPERATURE: 97.6 F

## 2019-04-16 DIAGNOSIS — K21.9 GASTROESOPHAGEAL REFLUX DISEASE WITHOUT ESOPHAGITIS: ICD-10-CM

## 2019-04-16 DIAGNOSIS — Z12.12 SCREENING FOR COLORECTAL CANCER: ICD-10-CM

## 2019-04-16 DIAGNOSIS — J20.8 ACUTE BRONCHITIS DUE TO OTHER SPECIFIED ORGANISMS: ICD-10-CM

## 2019-04-16 DIAGNOSIS — Z13.820 SCREENING FOR OSTEOPOROSIS: ICD-10-CM

## 2019-04-16 DIAGNOSIS — J30.2 SEASONAL ALLERGIES: Primary | ICD-10-CM

## 2019-04-16 DIAGNOSIS — Z12.11 SCREENING FOR COLORECTAL CANCER: ICD-10-CM

## 2019-04-16 DIAGNOSIS — I10 ESSENTIAL HYPERTENSION: ICD-10-CM

## 2019-04-16 DIAGNOSIS — E78.2 MIXED HYPERLIPIDEMIA: ICD-10-CM

## 2019-04-16 DIAGNOSIS — Z00.01 ENCOUNTER FOR WELL ADULT EXAM WITH ABNORMAL FINDINGS: Primary | ICD-10-CM

## 2019-04-16 PROCEDURE — 99396 PREV VISIT EST AGE 40-64: CPT | Performed by: NURSE PRACTITIONER

## 2019-04-16 PROCEDURE — 99214 OFFICE O/P EST MOD 30 MIN: CPT | Performed by: NURSE PRACTITIONER

## 2019-04-16 RX ORDER — LISINOPRIL 10 MG/1
10 TABLET ORAL DAILY
COMMUNITY
End: 2019-04-16 | Stop reason: SDUPTHER

## 2019-04-16 RX ORDER — METOPROLOL SUCCINATE 25 MG/1
25 TABLET, EXTENDED RELEASE ORAL DAILY
Qty: 90 TABLET | Refills: 1 | Status: SHIPPED | OUTPATIENT
Start: 2019-04-16 | End: 2019-09-11 | Stop reason: SDUPTHER

## 2019-04-16 RX ORDER — METHYLPREDNISOLONE 4 MG/1
TABLET ORAL
Qty: 21 TABLET | Refills: 0 | Status: SHIPPED | OUTPATIENT
Start: 2019-04-16 | End: 2019-04-16 | Stop reason: SDUPTHER

## 2019-04-16 RX ORDER — LOSARTAN POTASSIUM AND HYDROCHLOROTHIAZIDE 25; 100 MG/1; MG/1
1 TABLET ORAL DAILY
Qty: 90 TABLET | Refills: 1 | Status: SHIPPED | OUTPATIENT
Start: 2019-04-16 | End: 2019-09-11 | Stop reason: SDUPTHER

## 2019-04-16 RX ORDER — LOVASTATIN 40 MG/1
40 TABLET ORAL NIGHTLY
Qty: 90 TABLET | Refills: 1 | Status: SHIPPED | OUTPATIENT
Start: 2019-04-16 | End: 2019-10-16 | Stop reason: SDUPTHER

## 2019-04-16 RX ORDER — LOVASTATIN 20 MG/1
20 TABLET ORAL NIGHTLY
Qty: 90 TABLET | Refills: 1 | Status: CANCELLED | OUTPATIENT
Start: 2019-04-16

## 2019-04-16 RX ORDER — LANSOPRAZOLE 15 MG/1
15 CAPSULE, DELAYED RELEASE ORAL DAILY
Qty: 180 CAPSULE | Refills: 1 | Status: SHIPPED | OUTPATIENT
Start: 2019-04-16 | End: 2019-09-11

## 2019-04-16 RX ORDER — FLUTICASONE PROPIONATE 50 MCG
2 SPRAY, SUSPENSION (ML) NASAL DAILY
Qty: 3 BOTTLE | Refills: 1 | Status: SHIPPED | OUTPATIENT
Start: 2019-04-16 | End: 2019-09-11

## 2019-04-16 RX ORDER — METHYLPREDNISOLONE 4 MG/1
TABLET ORAL
Qty: 21 TABLET | Refills: 0 | Status: SHIPPED | OUTPATIENT
Start: 2019-04-16 | End: 2019-05-16

## 2019-04-16 RX ORDER — LISINOPRIL 20 MG/1
10 TABLET ORAL DAILY
Qty: 90 TABLET | Refills: 1 | Status: SHIPPED | OUTPATIENT
Start: 2019-04-16 | End: 2019-09-11

## 2019-04-16 NOTE — PATIENT INSTRUCTIONS
Healthy Eating to Prevent Digestive Disorders  The digestive system starts at the mouth and goes all the way down to the rectum. Along the way, your digestive system breaks down the food you eat so you can absorb its nutrients and use them for energy.  Digestive disorders can cause gas, bloating, pain, heartburn, and other symptoms. They can prevent your digestive system from doing its job. Healthy eating and a healthy lifestyle can help you avoid many common digestive disorders.  What nutrition changes can be made?  Start by eating a balanced diet. Eat healthy foods from all the major food groups. These include carbohydrates, fats, and proteins. Other changes you can make include to:  · Eat enough fiber. Fiber is a healthy carbohydrate that cleans out your digestive system. Fiber absorbs water and helps you have regular bowel movements. Fiber comes from plants. To get enough fiber in your diet, eat 4-5 servings of fruits, vegetables, and legumes every day. Include beans and whole grains. Most people should get 20?35 grams of fiber each day.  · Drink enough water to keep your urine clear or pale yellow. Water helps your body digest food. It can also help prevent constipation.  · Avoid fatty proteins. Full-fat dairy products and fatty meats are hard to digest. Fats you want to avoid are those that get solid at room temperature (saturated fats). Instead of eating these kinds of fats, eat plant-based unsaturated fats found in olives, canola, corn, avocado, and nuts.  · If you have trouble with gas, belching, or flatulence, avoid gas-producing foods. These include beans, carbonated beverages, cabbage, cauliflower, and broccoli. If you are lactose intolerant, avoid dairy products or choose lactose-free dairy products.  · If you have frequent heartburn, stay away from alcohol, caffeine, fatty foods, chocolate, and peppermint. Avoid lying down within two hours of eating a full meal. Overeating and lying down too soon after  a meal can cause heartburn.  · Add probiotics to your diet. Healthy digestion depends on having the right balance of good bacteria in your colon. Probiotics can help restore the balance of good bacteria in your digestive system. Probiotics are live active cultures that are found in yogurt, kefir, and cultured foods like sauerkraut and miso. You can also add good bacteria with probiotic supplements.  · Make sure to chew your food slowly and completely.  · Instead of eating three large meals each day, eat three small meals with three small snacks.    What other changes can I make?  You can help your digestive system stay healthy by making these lifestyle changes:  · Stay active and exercise every day.  · Maintain a healthy weight.  · Eat on a regular schedule.  · Avoid tight-fitting clothes. They can restrict digestion.  · If you have frequent heartburn, raise the head of your bed 2-3 inches (5-7.5 cm).  · Do not use any tobacco products, such as cigarettes, chewing tobacco, and e-cigarettes. If you need help quitting, ask your health care provider.  · Limit alcohol intake to no more than 1 drink a day for nonpregnant women and 2 drinks a day for men. One drink equals 12 oz of beer, 5 oz of wine, or 1½ oz of hard liquor.  · Avoid stress. Find ways to reduce stress, such as meditation, exercise, or taking time for activities that relax you.    Why should I make these changes?  Making these changes will help your digestive system function at its best. A healthy digestive system can help you avoid or improve your management of digestive disorders such as:  · Bloating, gas, and flatulence.  · Heartburn.  · Gastroesophageal reflux disease (GERD).  · Peptic ulcer disease.  · Hemorrhoids.  · Diverticulitis.  · Constipation.  · Diarrhea.  · Gall stones  · Irritable bowel syndrome.  · Malnutrition.  · Fatty liver disease.    What can happen if changes are not made?  Not making these changes could put you at risk for many  conditions caused by a poor diet or an unhealthy weight, such as heart disease, stroke and diabetes.  Where can I get more information?  Learn more about healthy eating and digestive disorders by visiting these websites:  · Academy of Nutrition and Dietetics: http://www.eatright.org/resources/health/wellness/digestive-health  · Centers for Disease Control and Prevention: https://health.gov/dietaryguidelines/2015/  · [place] Department of Health and Human Services: https://www.[facility].gov/files/assets/docs/the-healthy-woman/digestive_health.pdf    Summary  · A heathy diet can help prevent many digestive disorders.  · Eat a balanced diet consisting of fiber, unsaturated fats, lean protein, fruits, and vegetables.  · Eat three small meals with three small snacks per day.  · Drink plenty of water every day.  · Get plenty of exercise and maintain a healthy weight.  This information is not intended to replace advice given to you by your health care provider. Make sure you discuss any questions you have with your health care provider.  Document Released: 01/13/2017 Document Revised: 05/25/2017 Document Reviewed: 08/30/2017  [employer] Interactive Patient Education © 2019 [employer] Inc.

## 2019-04-16 NOTE — PROGRESS NOTES
Chief Complaint   Patient presents with   • Annual Exam     Pt is here for annual examination.         Allergies   Allergen Reactions   • Amoxicillin Rash   • Penicillins Rash       HPI:  Ave Singh is a 63 y.o. female presents today for annual exam.       Chronic Problems:  HTN stable with losartan-hctz and lisinopril and metoprolol, hyperlipidemia stable with lovastatin, b12 def stable with vit b12, herpes simplex stable with valacyclovir, GERD stable with lansoprazole.      Past Medical History:   Diagnosis Date   • Abnormal EKG 3/13/2019   • GERD (gastroesophageal reflux disease)    • Herpes simplex    • Hyperlipidemia    • Hypertension    • Left bundle branch block 3/13/2019     Past Surgical History:   Procedure Laterality Date   • APPENDECTOMY  08/2008   • HYSTERECTOMY     • LASIK  2005     Social History     Socioeconomic History   • Marital status:      Spouse name: Not on file   • Number of children: Not on file   • Years of education: Not on file   • Highest education level: Not on file   Tobacco Use   • Smoking status: Never Smoker   • Smokeless tobacco: Never Used   Substance and Sexual Activity   • Alcohol use: No     Comment: very rarely   • Drug use: No   • Sexual activity: Defer     Family History   Problem Relation Age of Onset   • Cancer Mother    • Breast cancer Mother    • Heart disease Father    • Heart attack Father 62   • Hypertension Brother    • Cancer Maternal Aunt    • Cancer Maternal Grandmother        Current Outpatient Medications on File Prior to Visit   Medication Sig Dispense Refill   • aspirin 81 MG chewable tablet Chew 81 mg Daily.     • Calcium Carb-Cholecalciferol (LIQUID CALCIUM WITH D3) 600-500 MG-UNIT capsule Take 2 tablets by mouth Daily.     • valACYclovir (VALTREX) 500 MG tablet Take 2 tablets by mouth 2 (Two) Times a Day. (Patient taking differently: Take 1,000 mg by mouth 2 (Two) Times a Day As Needed (fever blisters).) 60 tablet 5   • vitamin B-12  "(CYANOCOBALAMIN) 1000 MCG tablet Take 1,000 mcg by mouth Daily.     • [DISCONTINUED] lansoprazole (EQ LANSOPRAZOLE) 15 MG capsule Take 1 capsule by mouth Daily. 180 capsule 0   • [DISCONTINUED] lisinopril (PRINIVIL,ZESTRIL) 10 MG tablet Take 10 mg by mouth Daily.     • [DISCONTINUED] losartan-hydrochlorothiazide (HYZAAR) 100-25 MG per tablet Take 1 tablet by mouth Daily. 30 tablet 0   • [DISCONTINUED] lovastatin (MEVACOR) 20 MG tablet Take 1 tablet by mouth Every Night. (Patient taking differently: Take 20 mg by mouth 2 (Two) Times a Day.) 90 tablet 1   • [DISCONTINUED] metoprolol succinate XL (TOPROL-XL) 25 MG 24 hr tablet Take 1 tablet by mouth Daily. 30 tablet 11     No current facility-administered medications on file prior to visit.         REVIEW OF SYMPTOMS: (Positives bolded)all negative  General:  weight loss, fever, chills, night sweats, fatigue, appetite loss  HEENT:  blurry vision, eye pain, eye discharge, dry eyes, decreased vision  Respiratory: shortness of breath, cough, hemoptysis, wheezing, pleurisy,   Cardiovascular:  chest pain, PND, palpitation, edema, orthopnea, syncope, swelling of extremities  Gastro: Nausea, vomiting, diarrhea, hematemesis, abdominal pain, constipation  Genito: hematuria, dysuria, glycosuria, hesitancy, frequency, incontinence  Skin: rash, pruritis, sores, nail changes, skin thickening, change in wart/mole, itching, rash, new lesions, pruritus, nail changes  Neuro:  Migraine, numbness, ataxia, tremor, vertigo, weakness, memory loss, Irritability, dizziness  Endocrine: excessive thirst, polyuria, cold intolerance, heat intolerance, goiter  Psychiatric: depression, anxiety, anti-depressants, alcohol abuse, drug abuse,   \"All other systems reviewed and negative, except as listed above.”    The following portions of the patient's history were reviewed and updated as appropriate: allergies, current medications, past family history, past medical history, past social history, past " surgical history and problem list.    OBJECTIVE:  Constitutional:  NAD, Consistent with stated age.  Oriented x 3, alert Posture-Not doubled over.  Well developed and well nourished.   Patient is pleasant and cooperative with the interview and exam.    Integumentary: No rashes, ulcers or lesions. No edema.  Normal skin moisture/turgor. Skin is warm to touch, no increased warmth. Capillary refill is normal bilateral Upper and lower extremity.     Head/Neck:  normocephalic and atraumatic.  without visible/palpable lumps or pulsations.  Palpation- No bony tenderness about head/neck along frontal, occiptial, temporal, parietal, mastoid, jawline, zygoma, orbit or any other location.  NO temporal artery tenderness. No TMJ tenderness. Normal cervical ROM.   Neck Supple.  No thyromegaly, no nodules    Eye: Bilaterally PERRLA, EOMI.  No discharge.  Upper and lower eyelids are normal. Sclera/conjunctiva normal without discharge. Cornea is normal and clear. Lens is normal.  Eyeball appears normal. No ciliary flushing, no conjunctival injection.    ENMT:  TM's- Grey/pearly, normal light reflex and anatomy , hearing-normal to conversational speech at 2-5 feet.  No sinus tenderness along frontal/maxillary region. External appearance normal and midline.  Dentition assessed and discussed appropriate oral care. Tongue normal midline.   no pharyngeal erythema, Uvula midline. No post nasal drip. No exudate.     CHEST/LUNG:  symmetric chest wall no pectus deformity. Normal effort,  no use of accessory muscles. nontender sternum, ribline.  Breath sounds normal throughout all lung fields.  No wheezes, rales, rhonchi.     CARDIOVASCULAR: Regular rate and rhythm. No murmur noted in sitting, supine positions.  no digital clubbing, cyanosis, edema, increased warmth.    ABDOMEN:  Bowel sounds normal, no abdominal bruits. soft, non-tender, no rebound tenderness, no rigidity (guarding), no jar tenderness, no masses. no hepatomegaly, no  splenomegaly.     Musculoskeletal: Generalized-No generalized swelling or edema of extremities, no digital clubbing or cyanosis, neurovascularly intact all four extremities.  Upper extremity- Symmetrical posture.  No visible deformity.  Normal sensation along medial and lateral upper extremity proximally and distally.  NO tenderness overlying shoulder, lateral/medial epicondyle.  5/5 and strength 5/5 bilateral UE.  Elbow palpated, no tenderness overlying olecranon.  Normal supination, pronation to active/passive ROM and to resisted rotation. Bicep insertion/tricep insertion appear normal without obvious pathology. Rotator cuff evaluated and intact.  Normal wrist ROM bilaterally. Normal hand movement, intrinsic muscles of hands normal. No tenderness to palpation of hands/wrists/elbows.  Lower extremity- Not tender to palpation, no pain, no swelling, edema or erythema of surrounding tissue, normal strength and tone.  Normal appearing hip ROM bilaterally without pain.  Knee ROM normal at 0-120 degrees. No tenderness overlying trochanters, no tenderness about patella, quad tendon, patellar tendon.  No tenderness at tibial tuberosity. Ankle normal ROM not tender to palpation along medial/lateral malleolus. Normal movement of toes, no tenderness bilateral feet/toes.  Normal foot type. Calves symmetrical.  Stretching demonstrated today.  Spine/Ribs- No deformities, masses or tenderness, no known fractures, normal strength, Normal ROM. Normal stability No tenderness along C/T/L spine.  Normal appearing ROM about spine.        Neuropsych: Normal speech, normal rate, normal tone, normal use of language, volume and coherence.   intact, no SI/HI, no hallucinations, delusions, obsessions. Age appropriate fund of knowledge, concentration and attention span normal.    Lymphatic: Head/Neck- normal size and non tender to palpation. Axillary- Head and neck LN are normal size and non tender to palpation. Femoral and Inguinal-  "normal size and non tender to palpation.    /75 (BP Location: Left arm, Patient Position: Sitting, Cuff Size: Adult)   Pulse 73   Temp 97.6 °F (36.4 °C) (Oral)   Resp 18   Ht 172.7 cm (67.99\")   Wt 87.6 kg (193 lb 3.2 oz)   SpO2 96%   Breastfeeding? No   BMI 29.38 kg/m²          ASSESSMENT/PLAN  Ave was seen today for annual exam.    Diagnoses and all orders for this visit:    Encounter for well adult exam with abnormal findings    Essential hypertension      Health:  Exercise daily at least 30 minutes 3x week  Lose weight- decrease calories in diet, eat healthier      Preventive:  Physical:  Today  Lipid:  3/18/19  Mammogram: 2/25/19  Flu vaccine: 1/16/19  Pneumonia: declines   Zostavax; Declines    Dental check: has appt next week.    Vision exam: Feb 2019   Colonoscopy: 9 yrs ago but agrees to cologuard   Bone Density:  Ordered  Cologuard: ordered    Patient Counseling:  --Nutrition: Stressed importance of moderation in sodium/caffeine intake, saturated fat and cholesterol, caloric balance, sufficient intake of fresh fruits, vegetables, fiber, calcium, iron,   --Exercise: Stressed the importance of regular exercise.   --Sexuality: Briefly discussed.  Not sexually active. Patient safety discussed.   --Injury prevention: Discussed safety belts, safety helmets, smoke detector, smoking near bedding or upholstery.   --Dental health: Discussed importance of regular tooth brushing, flossing, and dental visits.  --Immunizations reviewed.  --After hours service discussed with patient    Return in about 1 year (around 4/16/2020) for Annual physical.      Yolanda Grant, DNP, APRN-BC  04/16/2019            "

## 2019-04-16 NOTE — PATIENT INSTRUCTIONS
High Triglycerides Eating Plan  Triglycerides are a type of fat in the blood. High levels of triglycerides can increase your risk of heart disease and stroke. If your triglyceride levels are high, choosing the right foods can help lower your triglycerides and keep your heart healthy. Work with your health care provider or a diet and nutrition specialist (dietitian) to develop an eating plan that is right for you.  What are tips for following this plan?  General guidelines  · Lose weight, if you are overweight. For most people, losing 5-10 lbs (2-5 kg) helps lower triglyceride levels. A weight-loss plan may include.  ? 30 minutes of exercise at least 5 days a week.  ? Reducing the amount of calories, sugar, and fat you eat.  · Eat a wide variety of fresh fruits, vegetables, and whole grains. These foods are high in fiber.  · Eat foods that contain healthy fats, such as fatty fish, nuts, seeds, and olive oil.  · Avoid foods that are high in added sugar, added salt (sodium), saturated fat, and trans fat.  · Avoid low-fiber, refined carbohydrates such as white bread, crackers, noodles, and white rice.  · Avoid foods with partially hydrogenated oils (trans fats), such as fried foods or stick margarine.  · Limit alcohol intake to no more than 1 drink a day for nonpregnant women and 2 drinks a day for men. One drink equals 12 oz of beer, 5 oz of wine, or 1½ oz of hard liquor. Your health care provider may recommend that you drink less depending on your overall health.  Reading food labels  · Check food labels for the amount of saturated fat. Choose foods with no or very little saturated fat.  · Check food labels for the amount of trans fat. Choose foods with no trans fat.  · Check food labels for the amount of cholesterol. Choose foods low in cholesterol. Ask your dietitian how much cholesterol you should have each day.  · Check food labels for the amount of sodium. Choose foods with less than 140 milligrams (mg) per  serving.  Shopping  · Buy dairy products labeled as nonfat (skim) or low-fat (1%).  · Avoid buying processed or prepackaged foods. These are often high in added sugar, sodium, and fat.  Cooking  · Choose healthy fats when cooking, such as olive oil or canola oil.  · Cook foods using lower fat methods, such as baking, broiling, boiling, or grilling.  · Make your own sauces, dressings, and marinades when possible, instead of buying them. Store-bought sauces, dressings, and marinades are often high in sodium and sugar.  Meal planning  · Eat more home-cooked food and less restaurant, buffet, and fast food.  · Eat fatty fish at least 2 times each week. Examples of fatty fish include salmon, trout, mackerel, tuna, and herring.  · If you eat whole eggs, do not eat more than 3 egg yolks per week.  What foods are recommended?  The items listed may not be a complete list. Talk with your dietitian about what dietary choices are best for you.  Grains  Whole wheat or whole grain breads, crackers, cereals, and pasta. Unsweetened oatmeal. Bulgur. Barley. Quinoa. Brown rice. Whole wheat flour tortillas.  Vegetables  Fresh or frozen vegetables. Low-sodium canned vegetables.  Fruits  All fresh, canned (in natural juice), or frozen fruits.  Meats and other protein foods  Skinless chicken or turkey. Ground chicken or turkey. Lean cuts of pork, trimmed of fat. Fish and seafood, especially salmon, trout, and herring. Egg whites. Dried beans, peas, or lentils. Unsalted nuts or seeds. Unsalted canned beans. Natural peanut or almond butter.  Dairy  Low-fat dairy products. Skim or low-fat (1%) milk. Reduced fat (2%) and low-sodium cheese. Low-fat ricotta cheese. Low-fat cottage cheese. Plain, low-fat yogurt.  Fats and oils  Tub margarine without trans fats. Light or reduced-fat mayonnaise. Light or reduced-fat salad dressings. Avocado. Safflower, olive, sunflower, soybean, and canola oils.  What foods are not recommended?  The items listed  may not be a complete list. Talk with your dietitian about what dietary choices are best for you.  Grains  White bread. White (regular) pasta. White rice. Cornbread. Bagels. Pastries. Crackers that contain trans fat.  Vegetables  Creamed or fried vegetables. Vegetables in a cheese sauce.  Fruits  Sweetened dried fruit. Canned fruit in syrup. Fruit juice.  Meats and other protein foods  Fatty cuts of meat. Ribs. Chicken wings. Villatoro. Sausage. Bologna. Salami. Chitterlings. Fatback. Hot dogs. Bratwurst. Packaged lunch meats.  Dairy  Whole or reduced-fat (2%) milk. Half-and-half. Cream cheese. Full-fat or sweetened yogurt. Full-fat cheese. Nondairy creamers. Whipped toppings. Processed cheese or cheese spreads. Cheese curds.  Beverages  Alcohol. Sweetened drinks, such as soda, lemonade, fruit drinks, or punches.  Fats and oils  Butter. Stick margarine. Lard. Shortening. Ghee. Villatoro fat. Tropical oils, such as coconut, palm kernel, or palm oils.  Sweets and desserts  Corn syrup. Sugars. Honey. Molasses. Candy. Jam and jelly. Syrup. Sweetened cereals. Cookies. Pies. Cakes. Donuts. Muffins. Ice cream.  Condiments  Store-bought sauces, dressings, and marinades that are high in sugar, such as ketchup and barbecue sauce.  Summary  · High levels of triglycerides can increase the risk of heart disease and stroke. Choosing the right foods can help lower your triglycerides.  · Eat plenty of fresh fruits, vegetables, and whole grains. Choose low-fat dairy and lean meats. Eat fatty fish at least twice a week.  · Avoid processed and prepackaged foods with added sugar, sodium, saturated fat, and trans fat.  · If you need suggestions or have questions about what types of food are good for you, talk with your health care provider or a dietitian.  This information is not intended to replace advice given to you by your health care provider. Make sure you discuss any questions you have with your health care provider.  Document Released:  10/05/2005 Document Revised: 02/20/2018 Document Reviewed: 02/20/2018  [employer] Interactive Patient Education © 2019 [employer]

## 2019-04-16 NOTE — PROGRESS NOTES
Chief Complaint   Patient presents with   • Hypertension     Pt is here for followup and medication refills.          History of Present Illness   Ave Singh is a 63 y.o. female here for follow up, office visit for HTN, hyperlipidemia, GERD and seasonal allergies. She says she has had an ongoing cough and that she ha had some sputum production but says it is too thick to come up.  She says the cough for 3 weeks now that gets worse at night and now it seems she is coughing every other breath.  She denies fever or chills.  She would like me to recheck bp because she has been monitoring it and it has been normal.  Rechecked bp 124/80      The following portions of the patient's history were reviewed and updated as appropriate: allergies, current medications, past family history, past medical history, past social history, past surgical history and problem list.    BP readings  130/71  104.62  111/71, 128/76  128/59  119/69  119/73  91/62  104/64  128/73  114/72  126/66        Current Outpatient Medications:   •  aspirin 81 MG chewable tablet, Chew 81 mg Daily., Disp: , Rfl:   •  Calcium Carb-Cholecalciferol (LIQUID CALCIUM WITH D3) 600-500 MG-UNIT capsule, Take 2 tablets by mouth Daily., Disp: , Rfl:   •  lansoprazole (EQ LANSOPRAZOLE) 15 MG capsule, Take 1 capsule by mouth Daily., Disp: 180 capsule, Rfl: 0  •  lisinopril (PRINIVIL,ZESTRIL) 10 MG tablet, Take 10 mg by mouth Daily., Disp: , Rfl:   •  losartan-hydrochlorothiazide (HYZAAR) 100-25 MG per tablet, Take 1 tablet by mouth Daily., Disp: 30 tablet, Rfl: 0  •  lovastatin (MEVACOR) 20 MG tablet, Take 1 tablet by mouth Every Night. (Patient taking differently: Take 20 mg by mouth 2 (Two) Times a Day.), Disp: 90 tablet, Rfl: 1  •  metoprolol succinate XL (TOPROL-XL) 25 MG 24 hr tablet, Take 1 tablet by mouth Daily., Disp: 30 tablet, Rfl: 11  •  valACYclovir (VALTREX) 500 MG tablet, Take 2 tablets by mouth 2 (Two) Times a Day. (Patient taking differently:  "Take 1,000 mg by mouth 2 (Two) Times a Day As Needed (fever blisters).), Disp: 60 tablet, Rfl: 5  •  vitamin B-12 (CYANOCOBALAMIN) 1000 MCG tablet, Take 1,000 mcg by mouth Daily., Disp: , Rfl:   Allergies   Allergen Reactions   • Amoxicillin Rash   • Penicillins Rash     Past Medical History:   Diagnosis Date   • Abnormal EKG 3/13/2019   • GERD (gastroesophageal reflux disease)    • Herpes simplex    • Hyperlipidemia    • Hypertension    • Left bundle branch block 3/13/2019     Past Surgical History:   Procedure Laterality Date   • APPENDECTOMY  08/2008   • HYSTERECTOMY     • LASIK  2005     Family History   Problem Relation Age of Onset   • Cancer Mother    • Breast cancer Mother    • Heart disease Father    • Heart attack Father 62   • Hypertension Brother    • Cancer Maternal Aunt    • Cancer Maternal Grandmother        REVIEW OF SYMPTOMS: (Positives bolded) all negative  General:  weight loss, fever, chills, night sweats, fatigue, appetite loss  HEENT:  blurry vision, eye pain, eye discharge, dry eyes, decreased vision  Respiratory: shortness of breath, cough, hemoptysis, wheezing, pleurisy,   Cardiovascular:  chest pain, PND, palpitation, edema, orthopnea, syncope  Gastro: Nausea, vomiting, diarrhea, hematemesis, abdominal pain, constipation  Genito: hematuria, dysuria, glycosuria, hesitancy, frequency, incontinence  Musckelo: Arthralgia, myalgia, muscle weakness, joint swelling, NSAID use  Skin: rash, pruritis, sores, nail changes, skin thickening, change in wart/mole, itching   Neuro:  Migraine, numbness, ataxia, tremor, vertigo, weakness, memory loss  \"All other systems reviewed and negative, except as listed above.”      OBJECTIVE:  Constitutional:  No acute distress, Consistent with stated age. Oriented x 3, alert Posture. Patient is pleasant and cooperative with the interview and exam.    Integumentary: No rashes, ulcers or lesions. No edema.  Normal skin moisture/turgor. Skin is warm to touch, no " "increased warmth. Capillary refill is normal bilateral Upper and lower extremity.     Eye: Bilaterally PERRLA, EOMI.  No discharge.  Upper and lower eyelids are normal. Sclera/conjunctiva normal without discharge. Cornea is normal and clear. Lens is normal.  Eyeball appears normal. No ciliary flushing, no conjunctival injection.    ENMT:  Canals  normal without erythema or discharge, no excessive cerumen. Tympanic membranes Grey/pearly, normal light reflex and anatomy. Hearing normal to conversational speech at 2-5 feet. Nares airflow, no discharge. Dentition assessed and discussed appropriate oral care. Tongue normal midline.  no pharyngeal erythema, Uvula midline. No post nasal drip.     CHEST/LUNG:Lungs clear throughout lung fields without rale, rhonchi or wheezes.  Normal effort, no distress, no use of accessory muscles. nontender sternum, ribline.  No abnormal pulsations.      CARDIOVASCULAR:  Regular rate and rhythm. No murmur noted in sitting, supine positions. digital clubbing, cyanosis, edema, increased warmth.  normal, no bruits or abnormal pulsations.      ABDOMEN: normal and no visible pulsations. Normal contour. Bowel sounds normal, no abdominal bruits. Abd soft, non-tender, no rebound tenderness, no rigidity (guarding), no jar tenderness, no masses.  no hepatomegaly, no splenomegaly     Neuropsych: Able to articulate well. Normal speech, normal rate, normal tone, normal use of language, volume and coherence.  Thought- normal with ability to perform basic computations and apply abstract thought/reason. No hallucinations, delusions, obsessions.   Appropriate judgement and memory.      /75 (BP Location: Left arm, Patient Position: Sitting, Cuff Size: Adult)   Pulse 73   Temp 97.6 °F (36.4 °C) (Oral)   Resp 18   Ht 172.7 cm (67.99\")   Wt 87.6 kg (193 lb 3.2 oz)   SpO2 96%   Breastfeeding? No   BMI 29.38 kg/m²     ASSESSMENT  Ave was seen today for hypertension.    Diagnoses and all orders " for this visit:    Seasonal allergies  -     fluticasone (FLONASE) 50 MCG/ACT nasal spray; 2 sprays into the nostril(s) as directed by provider Daily.  -     methylPREDNISolone (MEDROL, PAULA,) 4 MG tablet; Take as directed on package instructions.    Gastroesophageal reflux disease without esophagitis  -     lansoprazole (EQ LANSOPRAZOLE) 15 MG capsule; Take 1 capsule by mouth Daily.    Essential hypertension  -     losartan-hydrochlorothiazide (HYZAAR) 100-25 MG per tablet; Take 1 tablet by mouth Daily.  -     lisinopril (PRINIVIL,ZESTRIL) 20 MG tablet; Take 0.5 tablets by mouth Daily.  -     metoprolol succinate XL (TOPROL-XL) 25 MG 24 hr tablet; Take 1 tablet by mouth Daily.    Mixed hyperlipidemia    Acute bronchitis due to other specified organisms  -     methylPREDNISolone (MEDROL, PAULA,) 4 MG tablet; Take as directed on package instructions.    Other orders  -     Stop lovastatin (MEVACOR) 20 MG tablet; Take 1 tablet by mouth Every Night.  -     Take increased dose of lovastatin (MEVACOR) 40 MG tablet; Take 1 tablet by mouth Every Night.           Return in about 6 months (around 10/16/2019) for Recheck HTN, hyperlipidemia.    Yolanda Grant, DNP, APRN-BC   04/16/2019

## 2019-04-17 ENCOUNTER — RESULTS ENCOUNTER (OUTPATIENT)
Dept: FAMILY MEDICINE CLINIC | Facility: CLINIC | Age: 64
End: 2019-04-17

## 2019-04-17 DIAGNOSIS — Z12.11 SCREENING FOR COLORECTAL CANCER: ICD-10-CM

## 2019-04-17 DIAGNOSIS — Z12.12 SCREENING FOR COLORECTAL CANCER: ICD-10-CM

## 2019-04-26 ENCOUNTER — APPOINTMENT (OUTPATIENT)
Dept: BONE DENSITY | Facility: HOSPITAL | Age: 64
End: 2019-04-26

## 2019-05-08 DIAGNOSIS — Z12.11 ENCOUNTER FOR SCREENING COLONOSCOPY: Primary | ICD-10-CM

## 2019-05-16 ENCOUNTER — OFFICE VISIT (OUTPATIENT)
Dept: GASTROENTEROLOGY | Facility: CLINIC | Age: 64
End: 2019-05-16

## 2019-05-16 VITALS
HEART RATE: 82 BPM | HEIGHT: 68 IN | DIASTOLIC BLOOD PRESSURE: 80 MMHG | OXYGEN SATURATION: 98 % | WEIGHT: 191 LBS | BODY MASS INDEX: 28.95 KG/M2 | TEMPERATURE: 97.4 F | SYSTOLIC BLOOD PRESSURE: 128 MMHG

## 2019-05-16 DIAGNOSIS — R19.5 POSITIVE COLORECTAL CANCER SCREENING USING COLOGUARD TEST: Primary | ICD-10-CM

## 2019-05-16 DIAGNOSIS — Z86.010 HX OF COLONIC POLYPS: ICD-10-CM

## 2019-05-16 PROBLEM — Z86.0100 HX OF COLONIC POLYPS: Status: ACTIVE | Noted: 2019-05-16

## 2019-05-16 PROCEDURE — 99214 OFFICE O/P EST MOD 30 MIN: CPT | Performed by: NURSE PRACTITIONER

## 2019-05-16 RX ORDER — SODIUM, POTASSIUM,MAG SULFATES 17.5-3.13G
1 SOLUTION, RECONSTITUTED, ORAL ORAL EVERY 12 HOURS
Qty: 2 BOTTLE | Refills: 0 | Status: SHIPPED | OUTPATIENT
Start: 2019-05-16 | End: 2019-09-11

## 2019-05-16 RX ORDER — ASCORBIC ACID 500 MG
500 TABLET ORAL DAILY
COMMUNITY
End: 2019-09-11

## 2019-05-16 RX ORDER — AMOXICILLIN 500 MG
1 CAPSULE ORAL DAILY
COMMUNITY
Start: 2016-10-19

## 2019-05-16 NOTE — PROGRESS NOTES
Chief Complaint:   Chief Complaint   Patient presents with   • Positive Cologuard     Pt had cologuard at PCP's office-came back positive; Pt states her last colon was about 9-10 years ago in [place]; Personal history of colon polyps         Patient ID: Ave Singh is a 63 y.o. female     History of Present Illness: The patient was referred to our office by Yolanda and MARY Zavala for findings of positive Cologuard.    The patient states that she had a colonoscopy about 9 to 10 years ago in [place] that was normal.  She states prior to that she had had one other colonoscopy with findings of one polyp.  No known family history of colon cancer or colon polyps.    The patient denies any nausea, vomiting, epigastric pain, dysphagia, pyrosis or hematemesis.  The patient denies any fever or chills.  Denies any melena or hematochezia.  Denies any unintentional weight loss or loss of appetite.    Past Medical History:   Diagnosis Date   • Abnormal EKG 3/13/2019   • GERD (gastroesophageal reflux disease)    • Herpes simplex    • Hyperlipidemia    • Hypertension    • Left bundle branch block 3/13/2019       Past Surgical History:   Procedure Laterality Date   • APPENDECTOMY  08/2008   • HERNIA REPAIR      Done at same time as appendectomy   • HYSTERECTOMY     • LASIK  2005         Current Outpatient Medications:   •  aspirin 81 MG chewable tablet, Chew 81 mg Daily., Disp: , Rfl:   •  Calcium Carb-Cholecalciferol (LIQUID CALCIUM WITH D3) 600-500 MG-UNIT capsule, Take 2 tablets by mouth Daily., Disp: , Rfl:   •  fluticasone (FLONASE) 50 MCG/ACT nasal spray, 2 sprays into the nostril(s) as directed by provider Daily., Disp: 3 bottle, Rfl: 1  •  lansoprazole (EQ LANSOPRAZOLE) 15 MG capsule, Take 1 capsule by mouth Daily. (Patient taking differently: Take 15 mg by mouth As Needed.), Disp: 180 capsule, Rfl: 1  •  lisinopril (PRINIVIL,ZESTRIL) 20 MG tablet, Take 0.5 tablets by mouth Daily., Disp: 90 tablet, Rfl:  1  •  losartan-hydrochlorothiazide (HYZAAR) 100-25 MG per tablet, Take 1 tablet by mouth Daily., Disp: 90 tablet, Rfl: 1  •  lovastatin (MEVACOR) 40 MG tablet, Take 1 tablet by mouth Every Night., Disp: 90 tablet, Rfl: 1  •  metoprolol succinate XL (TOPROL-XL) 25 MG 24 hr tablet, Take 1 tablet by mouth Daily., Disp: 90 tablet, Rfl: 1  •  Multiple Vitamins-Minerals (MULTIVITAMIN ADULT PO), Take 1 tablet by mouth Daily., Disp: , Rfl:   •  Omega-3 Fatty Acids (FISH OIL) 1000 MG capsule capsule, Take 1 tablet by mouth Daily., Disp: , Rfl:   •  valACYclovir (VALTREX) 500 MG tablet, Take 2 tablets by mouth 2 (Two) Times a Day. (Patient taking differently: Take 1,000 mg by mouth 2 (Two) Times a Day As Needed (fever blisters).), Disp: 60 tablet, Rfl: 5  •  vitamin B-12 (CYANOCOBALAMIN) 1000 MCG tablet, Take 1,000 mcg by mouth Daily., Disp: , Rfl:   •  vitamin C (ASCORBIC ACID) 500 MG tablet, Take 500 mg by mouth Daily., Disp: , Rfl:   •  sodium-potassium-magnesium sulfates (SUPREP BOWEL PREP KIT) 17.5-3.13-1.6 GM/177ML solution oral solution, Take 1 bottle by mouth Every 12 (Twelve) Hours. Split dose prep as directed by office instructions provided.  2 bottles = one kit., Disp: 2 bottle, Rfl: 0    Allergies   Allergen Reactions   • Amoxicillin Rash   • Penicillins Rash       Social History     Socioeconomic History   • Marital status:      Spouse name: Not on file   • Number of children: Not on file   • Years of education: Not on file   • Highest education level: Not on file   Tobacco Use   • Smoking status: Never Smoker   • Smokeless tobacco: Never Used   Substance and Sexual Activity   • Alcohol use: No   • Drug use: No   • Sexual activity: Defer       Family History   Problem Relation Age of Onset   • Cancer Mother    • Breast cancer Mother    • Heart disease Father    • Heart attack Father 62   • Hypertension Brother    • Cancer Maternal Aunt    • Cancer Maternal Grandmother    • Colon polyps Neg Hx    • Colon  "cancer Neg Hx        Vitals:    05/16/19 1256   BP: 128/80   BP Location: Left arm   Patient Position: Sitting   Cuff Size: Adult   Pulse: 82   Temp: 97.4 °F (36.3 °C)   TempSrc: Tympanic   SpO2: 98%   Weight: 86.6 kg (191 lb)   Height: 172.7 cm (68\")       Review of Systems:    General:    Present -feeling well   Skin:    Not Present-Rash   HEENT:     Not Present-Acute visual changes or Acute hearing changes   Neck :    Not Present- swollen glands   Genitourinary:      Not Present- burning, frequency, urgency hematuria, dysuria,   Cardiovascular:   Not Present-chest pain, palpitations, or pressure   Respiratory:   Not Present- shortness of breath or cough   Gastrointestinal:  Musculoskeletal:  Neurological:  Psychiatric:   Present as mentioned in the HP    Not Present. Recent gait disturbances.    Not Present-Seizures and weakness in extremities.    Not Present- Anxiety or Depression.       Physical Exam:    General Appearance:    Alert, cooperative, in no acute distress   Psych:    Mood appropriate    Eyes:          conjunctivae and sclerae normal, no   icterus, no pallor   ENMT:    Ears appear intact with no abnormalities noted oral mucosa moist   Neck:   No adenopathy, supple, trachea midline, no thyromegaly, no   carotid bruit, no JVD    Cardiovascular:    Regular rhythm and normal rate, normal S1 and S2, no            murmur, no gallop, no rub, no click   Gastrointestinal:     Inspection normal.  Normal bowel sounds, no masses, no organomegaly, soft round non-tender, non-distended, no guarding, no rebound or tenderness. No hepatosplenomegaly.   Skin:   No bleeding, bruising or rash   Neurologic:   nonfocal       Lab Results   Component Value Date    WBC 6.99 02/11/2019    WBC 7.63 10/23/2018    HGB 13.6 02/11/2019    HGB 14.2 10/23/2018    HCT 40.9 02/11/2019    HCT 44.2 10/23/2018     02/11/2019     10/23/2018        Lab Results   Component Value Date     03/18/2019     02/11/2019    "  10/23/2018    K 4.0 03/18/2019    K 3.7 02/11/2019    K 4.1 10/23/2018     03/18/2019     02/11/2019    CL 99 10/23/2018    CO2 29.0 03/18/2019    CO2 29.0 02/11/2019    CO2 30.0 10/23/2018    BUN 24 (H) 03/18/2019    BUN 15 02/11/2019    BUN 20 10/23/2018    CREATININE 0.64 03/18/2019    CREATININE 0.54 02/11/2019    CREATININE 0.75 10/23/2018    BILITOT 0.6 02/11/2019    BILITOT 0.5 10/23/2018    ALKPHOS 67 02/11/2019    ALKPHOS 70 10/23/2018    ALT 70 (H) 02/11/2019    ALT 49 10/23/2018    AST 65 (H) 02/11/2019    AST 33 10/23/2018    GLUCOSE 100 02/11/2019       No results found for: INR    Body mass index is 29.04 kg/m². Patient's Body mass index is 29.04 kg/m². BMI is above normal parameters. Recommendations include: nutrition counseling.    Assessment and Plan:  Assessment/Plan   Ave was seen today for positive cologuard.    Diagnoses and all orders for this visit:    Positive colorectal cancer screening using Cologuard test  -     Case Request; Standing  -     Implement Anesthesia Orders Day of Procedure; Standing  -     Obtain Informed Consent; Standing  -     Verify bowel prep was successful; Standing  -     Case Request    Hx of colonic polyps  -     Case Request; Standing  -     Implement Anesthesia Orders Day of Procedure; Standing  -     Obtain Informed Consent; Standing  -     Verify bowel prep was successful; Standing  -     Case Request    Other orders  -     sodium-potassium-magnesium sulfates (SUPREP BOWEL PREP KIT) 17.5-3.13-1.6 GM/177ML solution oral solution; Take 1 bottle by mouth Every 12 (Twelve) Hours. Split dose prep as directed by office instructions provided.  2 bottles = one kit.             There are no Patient Instructions on file for this visit.    Next follow-up appointment          EMR Dragon/Transcription disclaimer:  Much of this encounter note is an electronic transcription/translation of spoken language to printed text. The electronic translation of spoken  language may permit erroneous, or at times, nonsensical words or phrases to be inadvertently transcribed; although I have reviewed the note for such errors, some may still exist.

## 2019-05-23 ENCOUNTER — ANESTHESIA (OUTPATIENT)
Dept: GASTROENTEROLOGY | Facility: HOSPITAL | Age: 64
End: 2019-05-23

## 2019-05-23 ENCOUNTER — ANESTHESIA EVENT (OUTPATIENT)
Dept: GASTROENTEROLOGY | Facility: HOSPITAL | Age: 64
End: 2019-05-23

## 2019-05-23 ENCOUNTER — HOSPITAL ENCOUNTER (OUTPATIENT)
Facility: HOSPITAL | Age: 64
Setting detail: HOSPITAL OUTPATIENT SURGERY
Discharge: HOME | End: 2019-05-23
Attending: INTERNAL MEDICINE | Admitting: INTERNAL MEDICINE

## 2019-05-23 VITALS
BODY MASS INDEX: 28.34 KG/M2 | SYSTOLIC BLOOD PRESSURE: 128 MMHG | WEIGHT: 187 LBS | TEMPERATURE: 97.4 F | DIASTOLIC BLOOD PRESSURE: 69 MMHG | RESPIRATION RATE: 18 BRPM | HEIGHT: 68 IN | HEART RATE: 66 BPM | OXYGEN SATURATION: 99 %

## 2019-05-23 DIAGNOSIS — R19.5 POSITIVE COLORECTAL CANCER SCREENING USING COLOGUARD TEST: ICD-10-CM

## 2019-05-23 DIAGNOSIS — Z86.010 HX OF COLONIC POLYPS: ICD-10-CM

## 2019-05-23 PROCEDURE — 25010000002 PROPOFOL 10 MG/ML EMULSION: Performed by: NURSE ANESTHETIST, CERTIFIED REGISTERED

## 2019-05-23 PROCEDURE — 45378 DIAGNOSTIC COLONOSCOPY: CPT | Performed by: INTERNAL MEDICINE

## 2019-05-23 RX ORDER — SODIUM CHLORIDE 9 MG/ML
500 INJECTION, SOLUTION INTRAVENOUS CONTINUOUS PRN
Status: DISCONTINUED | OUTPATIENT
Start: 2019-05-23 | End: 2019-05-23 | Stop reason: HOSPADM

## 2019-05-23 RX ORDER — LIDOCAINE HYDROCHLORIDE 20 MG/ML
INJECTION, SOLUTION INFILTRATION; PERINEURAL AS NEEDED
Status: DISCONTINUED | OUTPATIENT
Start: 2019-05-23 | End: 2019-05-23 | Stop reason: SURG

## 2019-05-23 RX ORDER — SODIUM CHLORIDE 0.9 % (FLUSH) 0.9 %
3 SYRINGE (ML) INJECTION AS NEEDED
Status: DISCONTINUED | OUTPATIENT
Start: 2019-05-23 | End: 2019-05-23 | Stop reason: HOSPADM

## 2019-05-23 RX ORDER — PROPOFOL 10 MG/ML
VIAL (ML) INTRAVENOUS AS NEEDED
Status: DISCONTINUED | OUTPATIENT
Start: 2019-05-23 | End: 2019-05-23 | Stop reason: SURG

## 2019-05-23 RX ADMIN — SODIUM CHLORIDE: 0.9 INJECTION, SOLUTION INTRAVENOUS at 10:30

## 2019-05-23 RX ADMIN — PROPOFOL 300 MG: 10 INJECTION, EMULSION INTRAVENOUS at 10:43

## 2019-05-23 RX ADMIN — LIDOCAINE HYDROCHLORIDE 100 MG: 20 INJECTION, SOLUTION INFILTRATION; PERINEURAL at 10:43

## 2019-05-23 NOTE — ANESTHESIA POSTPROCEDURE EVALUATION
"Patient: Ave Singh    Procedure Summary     Date:  05/23/19 Room / Location:   [facility]    Anesthesia Start:  1038 Anesthesia Stop:  1100    Procedure:  COLONOSCOPY WITH ANESTHESIA (N/A ) Diagnosis:       Positive colorectal cancer screening using Cologuard test      Hx of colonic polyps      (Positive colorectal cancer screening using Cologuard test [R19.5])      (Hx of colonic polyps [Z86.010])    Surgeon:  Anni Smith MD Provider:  Cameron Oreilly CRNA    Anesthesia Type:  MAC ASA Status:  2          Anesthesia Type: MAC  Last vitals  BP   128/69 (05/23/19 1122)   Temp   97.4 °F (36.3 °C) (05/23/19 0953)   Pulse   66 (05/23/19 1122)   Resp   18 (05/23/19 1122)     SpO2   99 % (05/23/19 1122)     Post Anesthesia Care and Evaluation    Patient location during evaluation: PHASE II  Patient participation: complete - patient participated  Level of consciousness: awake and alert  Pain score: 0  Pain management: adequate  Airway patency: patent  Anesthetic complications: No anesthetic complications  PONV Status: none  Cardiovascular status: acceptable  Respiratory status: acceptable  Hydration status: acceptable    Comments: Blood pressure 128/69, pulse 66, temperature 97.4 °F (36.3 °C), temperature source Temporal, resp. rate 18, height 172.7 cm (68\"), weight 84.8 kg (187 lb), SpO2 99 %, not currently breastfeeding.    Pt discharged from PACU based on amy score >8      "

## 2019-05-23 NOTE — ANESTHESIA PREPROCEDURE EVALUATION
Anesthesia Evaluation     Patient summary reviewed   no history of anesthetic complications:  NPO Solid Status: > 8 hours  NPO Liquid Status: > 4 hours           Airway   Mallampati: II  TM distance: >3 FB  Neck ROM: full  No difficulty expected  Dental - normal exam     Pulmonary    (+) sleep apnea (possible),   Cardiovascular   Exercise tolerance: good (4-7 METS)    (+) hypertension, hyperlipidemia,   (-) angina    ROS comment: LBBB    Neuro/Psych  GI/Hepatic/Renal/Endo    (+)  GERD,    (-) liver disease, no renal disease, diabetes    Musculoskeletal     Abdominal    Substance History      OB/GYN          Other                        Anesthesia Plan    ASA 2     MAC     intravenous induction   Anesthetic plan, all risks, benefits, and alternatives have been provided, discussed and informed consent has been obtained with: patient.

## 2019-06-19 ENCOUNTER — RESULTS ENCOUNTER (OUTPATIENT)
Dept: FAMILY MEDICINE CLINIC | Facility: CLINIC | Age: 64
End: 2019-06-19

## 2019-06-19 DIAGNOSIS — E78.2 MIXED HYPERLIPIDEMIA: ICD-10-CM

## 2019-06-27 LAB
CHOLEST SERPL-MCNC: 168 MG/DL (ref 0–200)
HDLC SERPL-MCNC: 37 MG/DL (ref 40–60)
LDLC SERPL CALC-MCNC: 84 MG/DL (ref 0–100)
TRIGL SERPL-MCNC: 236 MG/DL (ref 0–150)
VLDLC SERPL CALC-MCNC: 47.2 MG/DL

## 2019-09-11 ENCOUNTER — OFFICE VISIT (OUTPATIENT)
Dept: CARDIOLOGY | Facility: CLINIC | Age: 64
End: 2019-09-11

## 2019-09-11 VITALS
SYSTOLIC BLOOD PRESSURE: 136 MMHG | HEART RATE: 91 BPM | BODY MASS INDEX: 28.85 KG/M2 | HEIGHT: 68 IN | DIASTOLIC BLOOD PRESSURE: 78 MMHG | OXYGEN SATURATION: 96 % | WEIGHT: 190.4 LBS

## 2019-09-11 DIAGNOSIS — I10 ESSENTIAL HYPERTENSION: ICD-10-CM

## 2019-09-11 DIAGNOSIS — R00.2 PALPITATIONS: Primary | Chronic | ICD-10-CM

## 2019-09-11 DIAGNOSIS — R06.83 SNORING: Chronic | ICD-10-CM

## 2019-09-11 DIAGNOSIS — I44.7 LEFT BUNDLE BRANCH BLOCK: Chronic | ICD-10-CM

## 2019-09-11 DIAGNOSIS — E78.2 MIXED HYPERLIPIDEMIA: Chronic | ICD-10-CM

## 2019-09-11 PROBLEM — E78.5 HYPERLIPIDEMIA: Chronic | Status: ACTIVE | Noted: 2018-10-23

## 2019-09-11 PROCEDURE — 99214 OFFICE O/P EST MOD 30 MIN: CPT | Performed by: NURSE PRACTITIONER

## 2019-09-11 RX ORDER — FLUTICASONE PROPIONATE 50 MCG
2 SPRAY, SUSPENSION (ML) NASAL DAILY PRN
COMMUNITY
End: 2022-12-08 | Stop reason: SDUPTHER

## 2019-09-11 RX ORDER — METOPROLOL SUCCINATE 50 MG/1
50 TABLET, EXTENDED RELEASE ORAL DAILY
Qty: 90 TABLET | Refills: 3 | Status: SHIPPED | OUTPATIENT
Start: 2019-09-11 | End: 2020-05-27

## 2019-09-11 RX ORDER — LOSARTAN POTASSIUM AND HYDROCHLOROTHIAZIDE 25; 100 MG/1; MG/1
1 TABLET ORAL DAILY
Qty: 90 TABLET | Refills: 3 | Status: SHIPPED | OUTPATIENT
Start: 2019-09-11 | End: 2019-10-16 | Stop reason: SDUPTHER

## 2019-09-11 NOTE — ASSESSMENT & PLAN NOTE
Almost to goal per today's reading. Stop lisinopril. Continue losartan HCT. Increase Toprol to 50 mg daily. Check BP TIW and call if consistently not to goal of less than 130/80. Discussed risks of uncontrolled BP. Encouraged low salt diet and routine aerobic exercise.

## 2019-09-11 NOTE — PROGRESS NOTES
Subjective:     Encounter Date:09/11/2019    Chief Complaint:    Patient ID: Ave Singh is a 63 y.o. female here today for 6 month cardiac follow-up.    HPI     Hypertension      Additional comments: hasn't been checking lately, checked for a few months and were better/not bad - no worse than 130s/70s. Some 120s. She cut lisinopril in half due to fatigue, dizziness, and SOA after starting taking and felt much better. Losartan HCT added 4/2019 by PCP.               Palpitations      Additional comments: none since addition of Toprol 3/2019, echo normal 3/2019              Snoring      Additional comments: abnormal STEPHEN 3/2019, she declined sleep specialist referral          Last edited by Melina Stein APRN on 9/11/2019  1:36 PM. (History)        History:   Past Medical History:   Diagnosis Date   • Abnormal EKG 3/13/2019   • GERD (gastroesophageal reflux disease)    • Herpes simplex    • Hyperlipidemia    • Hypertension    • Left bundle branch block 3/13/2019     Past Surgical History:   Procedure Laterality Date   • APPENDECTOMY  08/2008   • COLONOSCOPY N/A 5/23/2019    Procedure: COLONOSCOPY WITH ANESTHESIA;  Surgeon: Anni Smith MD;  Location: [facility] ENDOSCOPY;  Service: Gastroenterology   • HERNIA REPAIR      Done at same time as appendectomy   • HYSTERECTOMY     • LASIK  2005     Social History     Socioeconomic History   • Marital status:      Spouse name: Not on file   • Number of children: Not on file   • Years of education: Not on file   • Highest education level: Not on file   Tobacco Use   • Smoking status: Never Smoker   • Smokeless tobacco: Never Used   Substance and Sexual Activity   • Alcohol use: No   • Drug use: No   • Sexual activity: Defer     Family History   Problem Relation Age of Onset   • Cancer Mother    • Breast cancer Mother    • Heart disease Father    • Heart attack Father 62   • Hypertension Brother    • Cancer Maternal Aunt    • Cancer Maternal  Grandmother    • Colon polyps Neg Hx    • Colon cancer Neg Hx        Outpatient Medications Marked as Taking for the 9/11/19 encounter (Office Visit) with Melina Stein APRN   Medication Sig Dispense Refill   • aspirin 81 MG chewable tablet Chew 81 mg Daily.     • cimetidine (TAGAMET) 200 MG tablet Take 200 mg by mouth Daily As Needed.     • losartan-hydrochlorothiazide (HYZAAR) 100-25 MG per tablet Take 1 tablet by mouth Daily. 90 tablet 3   • lovastatin (MEVACOR) 40 MG tablet Take 1 tablet by mouth Every Night. 90 tablet 1   • metoprolol succinate XL (TOPROL-XL) 50 MG 24 hr tablet Take 1 tablet by mouth Daily. 90 tablet 3   • Multiple Vitamins-Minerals (MULTIVITAMIN ADULT PO) Take 1 tablet by mouth Daily.     • Omega-3 Fatty Acids (FISH OIL) 1000 MG capsule capsule Take 1 tablet by mouth Daily.     • [DISCONTINUED] lisinopril (PRINIVIL,ZESTRIL) 20 MG tablet Take 0.5 tablets by mouth Daily. 90 tablet 1   • [DISCONTINUED] losartan-hydrochlorothiazide (HYZAAR) 100-25 MG per tablet Take 1 tablet by mouth Daily. 90 tablet 1   • [DISCONTINUED] metoprolol succinate XL (TOPROL-XL) 25 MG 24 hr tablet Take 1 tablet by mouth Daily. 90 tablet 1     Review of Systems:  Review of Systems   Constitution: Positive for malaise/fatigue (sometimes) and weight loss (9 lbs since 2/2019 intentional). Negative for chills, decreased appetite, fever and weakness.   HENT: Negative for congestion and nosebleeds.    Eyes: Negative for blurred vision and double vision.   Cardiovascular: Negative for chest pain, dyspnea on exertion, irregular heartbeat (no further), leg swelling, near-syncope, palpitations and syncope.   Respiratory: Positive for snoring (has improved per ). Negative for cough, shortness of breath and sputum production.    Endocrine: Negative for cold intolerance and heat intolerance.   Hematologic/Lymphatic: Does not bruise/bleed easily.   Skin: Negative for dry skin and rash.   Musculoskeletal: Negative for  "back pain, joint pain, joint swelling, muscle cramps and neck pain.   Gastrointestinal: Negative for abdominal pain, constipation, diarrhea, heartburn, nausea (IN THE MORNINGS) and vomiting.   Genitourinary: Positive for nocturia.   Neurological: Negative for excessive daytime sleepiness, dizziness, headaches, light-headedness and loss of balance.   Psychiatric/Behavioral: Negative for depression. The patient does not have insomnia and is not nervous/anxious.           Objective:   /78 (BP Location: Left arm, Patient Position: Sitting, Cuff Size: Adult)   Pulse 91   Ht 172.7 cm (68\")   Wt 86.4 kg (190 lb 6.4 oz)   SpO2 96%   BMI 28.95 kg/m²   Wt Readings from Last 3 Encounters:   09/11/19 86.4 kg (190 lb 6.4 oz)   05/23/19 84.8 kg (187 lb)   05/16/19 86.6 kg (191 lb)     BP Readings from Last 3 Encounters:   09/11/19 136/78   05/23/19 128/69   05/16/19 128/80       Physical Exam   Constitutional: She is oriented to person, place, and time. She appears well-developed and well-nourished.   overweight   Eyes: No scleral icterus.   Neck: No JVD present.   Cardiovascular: Normal rate, regular rhythm and intact distal pulses. Exam reveals distant heart sounds. Exam reveals no gallop and no friction rub.   No murmur heard.  Pulmonary/Chest: Effort normal and breath sounds normal.   Musculoskeletal: She exhibits no edema.   Neurological: She is alert and oriented to person, place, and time.   Skin: Skin is warm and dry.   Psychiatric: She has a normal mood and affect.   Vitals reviewed.      Lab/Diagnostics Review:   Lab Results   Component Value Date    CHLPL 168 06/26/2019    TRIG 236 (H) 06/26/2019    HDL 37 (L) 06/26/2019    LDL 84 06/26/2019     3/22/2019 echocardiogram, [facility], Dr. Antunez  · Left ventricular systolic function is normal. Estimated EF appears to be in the range of 61 - 65%.  · Left ventricular diastolic dysfunction (grade I) consistent with impaired relaxation.  · There are no hemodynamically " significant valvular abnormalities identified on this study.     3/19/2019 Overnight Oximetry on RA 21 minutes less than 88%, oxygen desat index 15    2/11/2019 Stress Echocardiogram  GOOD STAMINA WITHOUT CHEST PAIN  NORMAL HEMODYNAMICS AND RHYTHM  EKG IS UNINTERPRETABLE - LBBB  NO WALL MOTION ABNORMALITIES     **LOW RISK FOR ISCHEMIA**           Lab Results   Component Value Date     WBC 6.99 02/11/2019     HGB 13.6 02/11/2019     HCT 40.9 02/11/2019     MCV 84.2 02/11/2019      02/11/2019            Lab Results   Component Value Date     GLUCOSE 100 02/11/2019     BUN 15 02/11/2019     CREATININE 0.54 02/11/2019     EGFRIFNONA 114 02/11/2019     EGFRIFAFRI 95 10/23/2018     BCR 27.8 (H) 02/11/2019     K 3.7 02/11/2019     CO2 29.0 02/11/2019     CALCIUM 9.4 02/11/2019     PROTENTOTREF 7.6 10/23/2018     ALBUMIN 4.70 02/11/2019     LABIL2 1.5 10/23/2018     AST 65 (H) 02/11/2019     ALT 70 (H) 02/11/2019            Lab Results   Component Value Date     CHLPL 142 10/23/2018     TRIG 178 (H) 10/23/2018     HDL 35 (L) 10/23/2018     LDL 71 10/23/2018      10/23/2019 Vitamin D 62.8     2/11/2019 EKG   Normal sinus rhythm  Left axis deviation  Left bundle branch block  Abnormal ECG  When compared with ECG of 11-FEB-2019 11:28,  No significant change was found Although rate has increased    Referred By:  RASHIDA           Confirmed By:Teodoro Murphy MD     ECG 12 Lead  Date/Time: 3/13/2019 1:33 PM  Performed by: Melina Stein APRN  Authorized by: Melina Stein APRN   Comparison: compared with previous ECG from 2/11/2019  Comparison to previous ECG: Sinus arrhythmia is new  Rhythm: sinus rhythm  Rhythm comments: with sinus arrhythmia  Rate: normal  BPM: 71  Conduction: left bundle branch block  QRS axis: left    Clinical impression: abnormal EKG      Procedures: none in office today        Assessment/Plan:         Problem List Items Addressed This Visit        Cardiovascular and Mediastinum     Hypertension (Chronic)    Current Assessment & Plan     Almost to goal per today's reading. Stop lisinopril. Continue losartan HCT. Increase Toprol to 50 mg daily. Check BP TIW and call if consistently not to goal of less than 130/80. Discussed risks of uncontrolled BP. Encouraged low salt diet and routine aerobic exercise.          Relevant Medications    metoprolol succinate XL (TOPROL-XL) 50 MG 24 hr tablet    losartan-hydrochlorothiazide (HYZAAR) 100-25 MG per tablet    Hyperlipidemia (Chronic)    Current Assessment & Plan     Slightly worsened since last check on lovastatin. Consider changing to rosuvastatin if HDL remains less than 50 on next check. Encouraged routine aerobic exercise and healthy diet.  She will try taking lovastatin at bedtime as prescribed (has been taking in the morning).          Palpitations - Primary (Chronic)    Current Assessment & Plan     Stable on Toprol. Call if recur/worsen.          Left bundle branch block (Chronic)    Overview     2/11/2019 SE low risk for ischemia         Relevant Medications    metoprolol succinate XL (TOPROL-XL) 50 MG 24 hr tablet       Respiratory    Snoring (Chronic)    Current Assessment & Plan     Probable sleep apnea. Abnormal STEPHEN on RA. She has declined nocturnal oxygen and referral to sleep specialist. Discussed risks of untreated sleep apnea. Encouraged continued weight loss. She will call if changes her mind.              Return in about 6 months (around 3/11/2020) for Recheck.           MARY Carlos, ACNP-BC, CHFN-BC

## 2019-09-11 NOTE — ASSESSMENT & PLAN NOTE
Probable sleep apnea. Abnormal STEPHEN on RA. She has declined nocturnal oxygen and referral to sleep specialist. Discussed risks of untreated sleep apnea. Encouraged continued weight loss. She will call if changes her mind.

## 2019-09-11 NOTE — ASSESSMENT & PLAN NOTE
Slightly worsened since last check on lovastatin. Consider changing to rosuvastatin if HDL remains less than 50 on next check. Encouraged routine aerobic exercise and healthy diet.  She will try taking lovastatin at bedtime as prescribed (has been taking in the morning).

## 2019-10-16 ENCOUNTER — OFFICE VISIT (OUTPATIENT)
Dept: FAMILY MEDICINE CLINIC | Facility: CLINIC | Age: 64
End: 2019-10-16

## 2019-10-16 VITALS
BODY MASS INDEX: 29.07 KG/M2 | RESPIRATION RATE: 18 BRPM | HEART RATE: 58 BPM | TEMPERATURE: 97.8 F | HEIGHT: 68 IN | OXYGEN SATURATION: 98 % | DIASTOLIC BLOOD PRESSURE: 80 MMHG | SYSTOLIC BLOOD PRESSURE: 138 MMHG | WEIGHT: 191.8 LBS

## 2019-10-16 DIAGNOSIS — E78.2 MIXED HYPERLIPIDEMIA: Chronic | ICD-10-CM

## 2019-10-16 DIAGNOSIS — Z23 NEEDS FLU SHOT: ICD-10-CM

## 2019-10-16 DIAGNOSIS — B00.9 HERPES SIMPLEX: ICD-10-CM

## 2019-10-16 DIAGNOSIS — I10 ESSENTIAL HYPERTENSION: Primary | Chronic | ICD-10-CM

## 2019-10-16 DIAGNOSIS — E55.9 VITAMIN D DEFICIENCY: ICD-10-CM

## 2019-10-16 PROCEDURE — 99214 OFFICE O/P EST MOD 30 MIN: CPT | Performed by: NURSE PRACTITIONER

## 2019-10-16 PROCEDURE — 90674 CCIIV4 VAC NO PRSV 0.5 ML IM: CPT | Performed by: NURSE PRACTITIONER

## 2019-10-16 PROCEDURE — 90471 IMMUNIZATION ADMIN: CPT | Performed by: NURSE PRACTITIONER

## 2019-10-16 RX ORDER — LOVASTATIN 40 MG/1
40 TABLET ORAL NIGHTLY
Qty: 90 TABLET | Refills: 1 | Status: SHIPPED | OUTPATIENT
Start: 2019-10-16 | End: 2020-04-13 | Stop reason: SDUPTHER

## 2019-10-16 RX ORDER — LOSARTAN POTASSIUM AND HYDROCHLOROTHIAZIDE 25; 100 MG/1; MG/1
1 TABLET ORAL DAILY
Qty: 90 TABLET | Refills: 3 | Status: SHIPPED | OUTPATIENT
Start: 2019-10-16 | End: 2020-05-27 | Stop reason: SDUPTHER

## 2019-10-16 RX ORDER — VALACYCLOVIR HYDROCHLORIDE 500 MG/1
1000 TABLET, FILM COATED ORAL 2 TIMES DAILY PRN
Qty: 60 TABLET | Refills: 3 | Status: SHIPPED | OUTPATIENT
Start: 2019-10-16 | End: 2020-06-10

## 2019-10-16 NOTE — PROGRESS NOTES
Chief Complaint   Patient presents with   • Hypertension     Pt is here for followup for hypertension.          Allergies   Allergen Reactions   • Amoxicillin Rash   • Penicillins Rash       History provided by: self     HPI:  Subjective   Ave Singh is a 63 y.o. female presents today for follow up for HTN.  She had been having some problems with her bp being higher than she likes and asks if I will recheck her bp because she doesn't agree with the 164/91.  She checks it at home and although it is in the 130's it has never been in the 160's.  I rechecked her BP and it was 138/80.  Denies chest pain, shortness or breath or headaches.      Chronic problems: cardio protectant she takes aspirin daily, Reflux stable with cimetidine, HTN running on the higher side for her with losartan-hctz, seasonal allergies stable with fluticasone, hyperlipidemia stable with omega 3 fatty acids and lovastatin, takes valacyclovir for fever blisters,  And metoprolol for left bundle branch block and cardiac issues.        PCP currently listed as Yolanda Grant, DNP, APRN.     The following portions of the patient's history were reviewed and updated as appropriate: allergies, current medications, past family history, past medical history, past social history, past surgical history and problem list      Current Outpatient Medications:   •  aspirin 81 MG chewable tablet, Chew 81 mg Daily., Disp: , Rfl:   •  cimetidine (TAGAMET) 200 MG tablet, Take 200 mg by mouth Daily As Needed., Disp: , Rfl:   •  fluticasone (FLONASE) 50 MCG/ACT nasal spray, 2 sprays into the nostril(s) as directed by provider Daily As Needed for Rhinitis or Allergies., Disp: , Rfl:   •  losartan-hydrochlorothiazide (HYZAAR) 100-25 MG per tablet, Take 1 tablet by mouth Daily., Disp: 90 tablet, Rfl: 3  •  lovastatin (MEVACOR) 40 MG tablet, Take 1 tablet by mouth Every Night., Disp: 90 tablet, Rfl: 1  •  metoprolol succinate XL (TOPROL-XL) 50 MG 24 hr tablet,  Take 1 tablet by mouth Daily., Disp: 90 tablet, Rfl: 3  •  Multiple Vitamins-Minerals (MULTIVITAMIN ADULT PO), Take 1 tablet by mouth Daily., Disp: , Rfl:   •  Omega-3 Fatty Acids (FISH OIL) 1000 MG capsule capsule, Take 1 tablet by mouth Daily., Disp: , Rfl:   •  valACYclovir (VALTREX) 500 MG tablet, Take 2 tablets by mouth 2 (Two) Times a Day. (Patient taking differently: Take 1,000 mg by mouth 2 (Two) Times a Day As Needed (fever blisters).), Disp: 60 tablet, Rfl: 5  Past Medical History:   Diagnosis Date   • Abnormal EKG 3/13/2019   • GERD (gastroesophageal reflux disease)    • Herpes simplex    • Hyperlipidemia    • Hypertension    • Left bundle branch block 3/13/2019     Past Surgical History:   Procedure Laterality Date   • APPENDECTOMY  08/2008   • COLONOSCOPY N/A 5/23/2019    Procedure: COLONOSCOPY WITH ANESTHESIA;  Surgeon: Anni Smith MD;  Location: [facility] ENDOSCOPY;  Service: Gastroenterology   • HERNIA REPAIR      Done at same time as appendectomy   • HYSTERECTOMY     • LASIK  2005     Social History     Socioeconomic History   • Marital status:      Spouse name: Not on file   • Number of children: Not on file   • Years of education: Not on file   • Highest education level: Not on file   Tobacco Use   • Smoking status: Never Smoker   • Smokeless tobacco: Never Used   Substance and Sexual Activity   • Alcohol use: No   • Drug use: No   • Sexual activity: Defer     Family History   Problem Relation Age of Onset   • Cancer Mother    • Breast cancer Mother    • Heart disease Father    • Heart attack Father 62   • Hypertension Brother    • Cancer Maternal Aunt    • Cancer Maternal Grandmother    • Colon polyps Neg Hx    • Colon cancer Neg Hx        REVIEW OF SYMPTOMS: (Positives bolded)all negative today  General:  weight loss, fever, chills, night sweats, fatigue, appetite loss  HEENT:  blurry vision, eye pain, eye discharge, dry eyes, decreased vision  Respiratory: shortness of breath, cough,  "hemoptysis, wheezing, pleurisy,   Cardiovascular:  chest pain, PND, palpitation, edema, orthopnea, syncope  Gastro: Nausea, vomiting, diarrhea, hematemesis, abdominal pain, constipation  Genito: hematuria, dysuria, glycosuria, hesitancy, frequency, incontinence  Musckelo: Arthralgia, myalgia, muscle weakness, joint swelling, NSAID use  Skin: rash, pruritis, sores, nail changes, skin thickening, change in wart/mole, itching   Neuro:  Migraine, numbness, ataxia, tremor, vertigo, weakness, memory loss  \"All other systems reviewed and negative, except as listed above.”    OBJECTIVE:  Constitutional:  No acute distress, Consistent with stated age. Oriented x 3,  Gait normal. Patient is pleasant and cooperative with the interview and exam.    Integumentary: No rashes, ulcers or lesions. No edema.  Normal skin moisture/turgor. Skin is warm to touch, no increased warmth.      Eye: Bilaterally PERRLA, EOMI.   Upper and lower eyelids are normal. Sclera/conjunctiva normal without discharge. Cornea is normal and clear. Lens is normal.  Eyeball appears normal.     ENMT:  Canals  normal without erythema or discharge, no excessive cerumen. Tympanic membranes Grey/pearly, normal light reflex and anatomy. Hearing normal to conversational speech at 2-5 feet. Nares airflow, no discharge. Dentition assessed and discussed appropriate oral care. Tongue normal midline.  no pharyngeal erythema, Uvula midline. No post nasal drip.     CHEST/LUNG: Lungs clear throughout lung fields without rale, rhonchi or wheezes. no distress, no use of accessory muscles.       CARDIOVASCULAR:  Regular rate and rhythm. No murmur noted in sitting, supine positions.      ABDOMEN:  Bowel sounds normal, no abdominal bruits. Abd soft, non-tender, no rebound tenderness, no rigidity (guarding), no jar tenderness, no masses.  no hepatomegaly, no splenomegaly     Neuropsych: Normal speech, Thought- normal. No hallucinations, delusions, obsessions.   Appropriate " "judgement and memory.    Musculoskeletal:  digital clubbing or cyanosis, neurovascularly intact all four extremities.  Upper extremity- Symmetrical posture.  No visible deformity.  Normal sensation along medial and lateral upper extremity proximally and distally.  NO tenderness overlying shoulder, lateral/medial epicondyle.  5/5 and strength 5/5 bilateral UE.  Elbow palpated, no tenderness overlying olecranon.  Normal supination, pronation to active/passive ROM and to resisted rotation. Bicep insertion/tricep insertion appear normal without obvious pathology. Rotator cuff evaluated and intact.  Normal wrist ROM bilaterally. Normal hand movement, intrinsic muscles of hands normal. No tenderness to palpation of hands/wrists/elbows.  Lower extremity  Knee ROM normal at 0-120 degrees. No tenderness overlying trochanters, no tenderness about patella, quad tendon, patellar tendon. No tenderness at tibial tuberosity. Ankle normal ROM not tender to palpation along medial/lateral malleolus. Normal movement of toes, no tenderness bilateral feet/toes. Normal foot type. Calves symmetrical. Stretching demonstrated today  Cervical: Normal ROM with turning head right to left and touching chin to chest. Has no tenderness on palpation of the cervical spine  Lumbar Spine:  Normal ROM with bending over, twisting right and left.  No tenderness on palpation of the lumbar spine or sciatic notch.  Straight leg raises normal bilaterally. Can heel/toe walk.  Inversion/eversion normal     /91 (BP Location: Left arm, Patient Position: Sitting, Cuff Size: Adult)   Pulse 58   Temp 97.8 °F (36.6 °C) (Oral)   Resp 18   Ht 172.7 cm (67.99\")   Wt 87 kg (191 lb 12.8 oz)   SpO2 98%   Breastfeeding? No   BMI 29.17 kg/m²     ASSESSMENT  Ave was seen today for hypertension.    Diagnoses and all orders for this visit:    Essential hypertension  Pt wanted bp rechecked at 138/80  -     CBC & Differential  -     Comprehensive metabolic " panel  -     losartan-hydrochlorothiazide (HYZAAR) 100-25 MG per tablet; Take 1 tablet by mouth Daily.  -    Monitor bp at home and if it continues to be elevated follow up     Mixed hyperlipidemia         --     Lipid panel        -     lovastatin (MEVACOR) 40 MG tablet; Take 1 tablet by mouth Every Night.  Vitamin D deficiency  -     Vitamin D 25 Hydroxy    Herpes simplex  -     valACYclovir (VALTREX) 500 MG tablet; Take 2 tablets by mouth 2 (Two) Times a Day As Needed (fever blisters).      Obesity Plan:  BMI 29.2  Weight 191 Plan:  Lose 3 pounds before next visit  The patient BMI is outside this range and we recommended/discussed today to utilize a diet/exercise program to get back into the appropriate range.  Federal guidelines recommend that people under the age of 65 should have a BMI of 18.5-24.9  The initial step is to document everything that is consumed into a food diary. Studies have shown that patients can lose up to 2x the weight by keeping track of foods.   Choose one bad food weekly and eliminate it from your diet.  Replace with one healthy food  Goal over next 2-4 weeks is walk 30 minutes per day 5 days per week at pace difficult to hold conversation.   Drink more water, less soda.   Cut back on portion sizes.   Today we encouraged roughly a 1 lb per week weight loss with initial goal of 5% weight loss.  Discussed if eating out for a meal, consider cutting food in half and placing into a to go container.  Individually portion any foods coming into the home based on package.  Use smaller plates  Drinking 12 oz of water 30 minutes before meal as way to suppress appetite.  Medications discussed today include metformin, topamax, phentermine, Qsymia, Belviq (lorcaserin), Contrave (Buproprion/naltrexone).    Lifestyle therapy   Simple advice to lose weight   Internet programs or self help books.    Advice from dietitian  Set Goals that are realistic   Encouraged to use visual aides to help in measuring  foods  Baseball-1 cup good for green salad, frozen yogurt, medium piece of fruit, baked potato  Handful - ½ cup good cut fruit, cooked vegetables, pasta, rice  Egg- ¼ cup good for dried fruit  Deck of cards-3 ounces good for meat and poultry  Check book-3 ounces good for grilled fish  6 dice side by side- 1 ½ ounces good for natural cheese  Simple tips   Plate method-reduce plate size to 9 inch dinner plate.  Half of plate should be filled with non-starchy vegetables (broccoli, lettuce, cauliflower, tomatoes), ¼ plate with lean source of protein (lean chicken, turkey, fish), remaining ½ with whole grains (brown rice, potato, whole grain breads  Avoid liquid calories (regular soda, juice, coffee with cream)  Focus  on water, seltzer water and other non-calorie drinks  Replace regular sugar with non-caloric sweeteners  Avoid skipping meals: plan small regular meals throughout the day in order to keep your hunger controlled  Consider using meal replacements if unable to plan a healthy meal (protein shake, high protein bar)  Replace all white bread with whole wheat/whole grain alternative  Swap regular salad dressings (mayonnaise, butter, or low fat or fat free alternative)  Avoid high fat, high calorie, high carbohydrate snakes (cookies, pastries, cakes)  Snack on fruits, low fat dairy (yogurt, cottage cheese)    Behavioral Modification  Self-Monitoring of dietary Intake-keep a dietary intake log. Obese individuals have been shown to underestimate their food intake  Behavioral treatment -gives exacts about amount and total calories  Read food labels    R/B/A of medications/treatment options d/w  the pt/family today. The patient/family are aware and accept that medications can have side effects.  If there any obvious side effects they should call or return to clinic as soon as possible.  Appropriate f/u discussed for topics addressed today. All questions were answered to the satisfactory state of patient/family.  Should  symptoms fail to improve or worsen they agree to call or return to clinic or to go to the ER. Education handouts were offered on any new Rx if requested.  Discussed the importance of f/u with any needed screening tests/labs/specialist appointments and any requested follow-up recommended by me today.  Importance of maintaining f/u discussed and patient accepts that missed appointments can delay diagnosis and potentially lead to worsening of conditions.    Using medications as prescribed.  Discussed need to take regularly as prescribed, to avoid missing doses as able.  Medications reviewed with patient today. We discussed cessation/continuation of medications for current problem.  Needed Rx refills will be provided.  No side effects from medications.       Return in about 6 months (around 4/16/2020) for Recheck chronic.    Electronically signed by Yolanda Grant, BIBIANA, APRN, 10/16/19, 11:18 AM.

## 2019-10-17 LAB
25(OH)D3+25(OH)D2 SERPL-MCNC: 60.8 NG/ML (ref 30–100)
ALBUMIN SERPL-MCNC: 5 G/DL (ref 3.5–5.2)
ALBUMIN/GLOB SERPL: 1.9 G/DL
ALP SERPL-CCNC: 62 U/L (ref 39–117)
ALT SERPL-CCNC: 37 U/L (ref 1–33)
AST SERPL-CCNC: 27 U/L (ref 1–32)
BASOPHILS # BLD AUTO: 0.04 10*3/MM3 (ref 0–0.2)
BASOPHILS NFR BLD AUTO: 0.7 % (ref 0–1.5)
BILIRUB SERPL-MCNC: 0.7 MG/DL (ref 0.2–1.2)
BUN SERPL-MCNC: 15 MG/DL (ref 8–23)
BUN/CREAT SERPL: 17.9 (ref 7–25)
CALCIUM SERPL-MCNC: 10.2 MG/DL (ref 8.6–10.5)
CHLORIDE SERPL-SCNC: 99 MMOL/L (ref 98–107)
CHOLEST SERPL-MCNC: 160 MG/DL (ref 0–200)
CO2 SERPL-SCNC: 26.9 MMOL/L (ref 22–29)
CREAT SERPL-MCNC: 0.84 MG/DL (ref 0.57–1)
EOSINOPHIL # BLD AUTO: 0.12 10*3/MM3 (ref 0–0.4)
EOSINOPHIL NFR BLD AUTO: 2 % (ref 0.3–6.2)
ERYTHROCYTE [DISTWIDTH] IN BLOOD BY AUTOMATED COUNT: 12.8 % (ref 12.3–15.4)
GLOBULIN SER CALC-MCNC: 2.6 GM/DL
GLUCOSE SERPL-MCNC: 105 MG/DL (ref 65–99)
HCT VFR BLD AUTO: 43.4 % (ref 34–46.6)
HDLC SERPL-MCNC: 41 MG/DL (ref 40–60)
HGB BLD-MCNC: 14 G/DL (ref 12–15.9)
IMM GRANULOCYTES # BLD AUTO: 0.01 10*3/MM3 (ref 0–0.05)
IMM GRANULOCYTES NFR BLD AUTO: 0.2 % (ref 0–0.5)
LDLC SERPL CALC-MCNC: 74 MG/DL (ref 0–100)
LYMPHOCYTES # BLD AUTO: 1.84 10*3/MM3 (ref 0.7–3.1)
LYMPHOCYTES NFR BLD AUTO: 29.9 % (ref 19.6–45.3)
MCH RBC QN AUTO: 28.3 PG (ref 26.6–33)
MCHC RBC AUTO-ENTMCNC: 32.3 G/DL (ref 31.5–35.7)
MCV RBC AUTO: 87.9 FL (ref 79–97)
MONOCYTES # BLD AUTO: 0.5 10*3/MM3 (ref 0.1–0.9)
MONOCYTES NFR BLD AUTO: 8.1 % (ref 5–12)
NEUTROPHILS # BLD AUTO: 3.64 10*3/MM3 (ref 1.7–7)
NEUTROPHILS NFR BLD AUTO: 59.1 % (ref 42.7–76)
NRBC BLD AUTO-RTO: 0 /100 WBC (ref 0–0.2)
PLATELET # BLD AUTO: 224 10*3/MM3 (ref 140–450)
POTASSIUM SERPL-SCNC: 4.4 MMOL/L (ref 3.5–5.2)
PROT SERPL-MCNC: 7.6 G/DL (ref 6–8.5)
RBC # BLD AUTO: 4.94 10*6/MM3 (ref 3.77–5.28)
SODIUM SERPL-SCNC: 140 MMOL/L (ref 136–145)
TRIGL SERPL-MCNC: 225 MG/DL (ref 0–150)
VLDLC SERPL CALC-MCNC: 45 MG/DL
WBC # BLD AUTO: 6.15 10*3/MM3 (ref 3.4–10.8)

## 2020-02-28 ENCOUNTER — TELEPHONE (OUTPATIENT)
Dept: FAMILY MEDICINE CLINIC | Facility: CLINIC | Age: 65
End: 2020-02-28

## 2020-02-28 DIAGNOSIS — Z12.39 SCREENING FOR MALIGNANT NEOPLASM OF BREAST: Primary | ICD-10-CM

## 2020-02-28 NOTE — TELEPHONE ENCOUNTER
She likes to do her mammogram in February.  I ordered please schedule and we will do breast exam when she has her well in april

## 2020-02-28 NOTE — TELEPHONE ENCOUNTER
Pt called and wanted to request to have mammogram scheduled for her.    Please contact pt to schedule mammogram @ [phone]

## 2020-03-18 ENCOUNTER — HOSPITAL ENCOUNTER (OUTPATIENT)
Dept: MAMMOGRAPHY | Facility: HOSPITAL | Age: 65
Discharge: HOME | End: 2020-03-18
Admitting: NURSE PRACTITIONER

## 2020-03-18 PROCEDURE — 77067 SCR MAMMO BI INCL CAD: CPT

## 2020-03-18 PROCEDURE — 77063 BREAST TOMOSYNTHESIS BI: CPT

## 2020-04-13 DIAGNOSIS — I10 ESSENTIAL HYPERTENSION: Chronic | ICD-10-CM

## 2020-04-13 RX ORDER — LOVASTATIN 40 MG/1
40 TABLET ORAL NIGHTLY
Qty: 90 TABLET | Refills: 0 | Status: SHIPPED | OUTPATIENT
Start: 2020-04-13 | End: 2020-05-27 | Stop reason: SDUPTHER

## 2020-05-27 ENCOUNTER — OFFICE VISIT (OUTPATIENT)
Dept: FAMILY MEDICINE CLINIC | Facility: CLINIC | Age: 65
End: 2020-05-27

## 2020-05-27 VITALS
BODY MASS INDEX: 29.64 KG/M2 | DIASTOLIC BLOOD PRESSURE: 87 MMHG | HEART RATE: 65 BPM | OXYGEN SATURATION: 98 % | SYSTOLIC BLOOD PRESSURE: 153 MMHG | HEIGHT: 68 IN | TEMPERATURE: 98.2 F | RESPIRATION RATE: 18 BRPM | WEIGHT: 195.6 LBS

## 2020-05-27 VITALS
HEIGHT: 68 IN | BODY MASS INDEX: 29.64 KG/M2 | TEMPERATURE: 98.2 F | DIASTOLIC BLOOD PRESSURE: 87 MMHG | SYSTOLIC BLOOD PRESSURE: 153 MMHG | RESPIRATION RATE: 18 BRPM | OXYGEN SATURATION: 98 % | WEIGHT: 195.6 LBS | HEART RATE: 65 BPM

## 2020-05-27 DIAGNOSIS — R39.14 FEELING OF INCOMPLETE BLADDER EMPTYING: ICD-10-CM

## 2020-05-27 DIAGNOSIS — K13.0 LIP LESION: ICD-10-CM

## 2020-05-27 DIAGNOSIS — N95.1 MENOPAUSAL AND FEMALE CLIMACTERIC STATES: ICD-10-CM

## 2020-05-27 DIAGNOSIS — E78.2 MIXED HYPERLIPIDEMIA: Chronic | ICD-10-CM

## 2020-05-27 DIAGNOSIS — R35.0 URINARY FREQUENCY: ICD-10-CM

## 2020-05-27 DIAGNOSIS — I10 ESSENTIAL HYPERTENSION: Chronic | ICD-10-CM

## 2020-05-27 DIAGNOSIS — Z13.820 OSTEOPOROSIS SCREENING: ICD-10-CM

## 2020-05-27 DIAGNOSIS — E55.9 VITAMIN D DEFICIENCY: ICD-10-CM

## 2020-05-27 DIAGNOSIS — Z00.00 WELCOME TO MEDICARE PREVENTIVE VISIT: Primary | ICD-10-CM

## 2020-05-27 DIAGNOSIS — R39.15 URINARY URGENCY: Primary | ICD-10-CM

## 2020-05-27 LAB
BILIRUB BLD-MCNC: NEGATIVE MG/DL
CLARITY, POC: CLEAR
COLOR UR: YELLOW
GLUCOSE UR STRIP-MCNC: NEGATIVE MG/DL
KETONES UR QL: NEGATIVE
LEUKOCYTE EST, POC: NEGATIVE
NITRITE UR-MCNC: NEGATIVE MG/ML
PH UR: 6 [PH] (ref 5–8)
PROT UR STRIP-MCNC: NEGATIVE MG/DL
RBC # UR STRIP: NEGATIVE /UL
SP GR UR: 1.01 (ref 1–1.03)
UROBILINOGEN UR QL: NORMAL

## 2020-05-27 PROCEDURE — 90732 PPSV23 VACC 2 YRS+ SUBQ/IM: CPT | Performed by: NURSE PRACTITIONER

## 2020-05-27 PROCEDURE — 81003 URINALYSIS AUTO W/O SCOPE: CPT | Performed by: NURSE PRACTITIONER

## 2020-05-27 PROCEDURE — G0009 ADMIN PNEUMOCOCCAL VACCINE: HCPCS | Performed by: NURSE PRACTITIONER

## 2020-05-27 PROCEDURE — 99214 OFFICE O/P EST MOD 30 MIN: CPT | Performed by: NURSE PRACTITIONER

## 2020-05-27 PROCEDURE — G0402 INITIAL PREVENTIVE EXAM: HCPCS | Performed by: NURSE PRACTITIONER

## 2020-05-27 RX ORDER — LORATADINE 10 MG/1
10 TABLET ORAL DAILY
Qty: 90 TABLET | Refills: 2 | Status: SHIPPED | OUTPATIENT
Start: 2020-05-27

## 2020-05-27 RX ORDER — ACYCLOVIR 50 MG/G
CREAM TOPICAL
Status: CANCELLED | OUTPATIENT
Start: 2020-05-27

## 2020-05-27 RX ORDER — METOPROLOL SUCCINATE 100 MG/1
100 TABLET, EXTENDED RELEASE ORAL DAILY
Qty: 90 TABLET | Refills: 1 | Status: SHIPPED | OUTPATIENT
Start: 2020-05-27 | End: 2020-09-15 | Stop reason: SDUPTHER

## 2020-05-27 RX ORDER — LORATADINE 10 MG/1
10 TABLET ORAL DAILY
COMMUNITY
End: 2020-05-27 | Stop reason: SDUPTHER

## 2020-05-27 RX ORDER — LOSARTAN POTASSIUM AND HYDROCHLOROTHIAZIDE 25; 100 MG/1; MG/1
1 TABLET ORAL DAILY
Qty: 90 TABLET | Refills: 1 | Status: CANCELLED | OUTPATIENT
Start: 2020-05-27 | End: 2021-05-27

## 2020-05-27 RX ORDER — METOPROLOL SUCCINATE 100 MG/1
100 TABLET, EXTENDED RELEASE ORAL DAILY
Qty: 90 TABLET | Refills: 1 | Status: CANCELLED | OUTPATIENT
Start: 2020-05-27

## 2020-05-27 RX ORDER — ACYCLOVIR 50 MG/G
CREAM TOPICAL
Qty: 5 G | Refills: 0 | Status: SHIPPED | OUTPATIENT
Start: 2020-05-27 | End: 2020-06-10 | Stop reason: SDUPTHER

## 2020-05-27 RX ORDER — ASCORBIC ACID 500 MG
1000 TABLET ORAL DAILY
COMMUNITY

## 2020-05-27 RX ORDER — METOPROLOL SUCCINATE 50 MG/1
50 TABLET, EXTENDED RELEASE ORAL DAILY
Qty: 90 TABLET | Refills: 1 | Status: CANCELLED | OUTPATIENT
Start: 2020-05-27 | End: 2021-05-27

## 2020-05-27 RX ORDER — LOVASTATIN 40 MG/1
40 TABLET ORAL NIGHTLY
Qty: 90 TABLET | Refills: 1 | Status: SHIPPED | OUTPATIENT
Start: 2020-05-27 | End: 2020-09-15 | Stop reason: SDUPTHER

## 2020-05-27 RX ORDER — ACYCLOVIR 50 MG/G
CREAM TOPICAL
COMMUNITY
End: 2020-05-27 | Stop reason: SDUPTHER

## 2020-05-27 RX ORDER — MELATONIN 10 MG
1000 TABLET, SUBLINGUAL SUBLINGUAL DAILY
COMMUNITY

## 2020-05-27 RX ORDER — LOSARTAN POTASSIUM AND HYDROCHLOROTHIAZIDE 25; 100 MG/1; MG/1
1 TABLET ORAL DAILY
Qty: 90 TABLET | Refills: 3 | Status: SHIPPED | OUTPATIENT
Start: 2020-05-27 | End: 2020-09-15 | Stop reason: SDUPTHER

## 2020-05-27 NOTE — PROGRESS NOTES
The ABCs of the Annual Wellness Visit  Initial Medicare Wellness Visit    Chief Complaint   Patient presents with   • Welcome To Medicare     patient was elgible for medicare starting November 1st 2019.       Subjective   History of Present Illness:  Ave Singh is a 64 y.o. female who presents for an Initial Medicare Wellness Visit.    HEALTH RISK ASSESSMENT    Recent Hospitalizations:  No hospitalization(s) within the last year.    Current Medical Providers:  Patient Care Team:  Yolanda Grant, DNP, APRN as PCP - General (Family Medicine)  Aggie Walton MD as Consulting Physician (Ophthalmology)    Smoking Status:  Social History     Tobacco Use   Smoking Status Never Smoker   Smokeless Tobacco Never Used       Alcohol Consumption:  Social History     Substance and Sexual Activity   Alcohol Use No       Depression Screen:   PHQ-2/PHQ-9 Depression Screening 5/27/2020   Little interest or pleasure in doing things 0   Feeling down, depressed, or hopeless 0   Total Score 0       Fall Risk Screen:  AC Fall Risk Assessment was completed, and patient is at LOW risk for falls.Assessment completed on:5/27/2020    Health Habits and Functional and Cognitive Screening:  Functional & Cognitive Status 5/27/2020   Do you have difficulty preparing food and eating? No   Do you have difficulty bathing yourself, getting dressed or grooming yourself? No   Do you have difficulty using the toilet? No   Do you have difficulty moving around from place to place? No   Do you have trouble with steps or getting out of a bed or a chair? No   Current Diet Unhealthy Diet   Dental Exam Up to date   Eye Exam Up to date   Exercise (times per week) 0 times per week   Current Exercise Activities Include Yard Work   Do you need help using the phone?  No   Are you deaf or do you have serious difficulty hearing?  No   Do you need help with transportation? No   Do you need help shopping? No   Do you need help preparing meals?  No   Do  you need help with housework?  No   Do you need help with laundry? No   Do you need help taking your medications? No   Do you need help managing money? No   Do you ever drive or ride in a car without wearing a seat belt? No   Have you felt unusual stress, anger or loneliness in the last month? No   Who do you live with? Spouse   If you need help, do you have trouble finding someone available to you? No   Have you been bothered in the last four weeks by sexual problems? No   Do you have difficulty concentrating, remembering or making decisions? No         Does the patient have evidence of cognitive impairment? No    Asprin use counseling:Taking ASA appropriately as indicated    Age-appropriate Screening Schedule:  Refer to the list below for future screening recommendations based on patient's age, sex and/or medical conditions. Orders for these recommended tests are listed in the plan section. The patient has been provided with a written plan.    Health Maintenance   Topic Date Due   • ZOSTER VACCINE (1 of 2) 11/14/2004   • DXA SCAN  10/23/2018   • INFLUENZA VACCINE  08/01/2020   • LIPID PANEL  05/27/2021   • MAMMOGRAM  03/18/2022   • TDAP/TD VACCINES (2 - Td) 09/17/2022   • PAP SMEAR  Discontinued          The following portions of the patient's history were reviewed and updated as appropriate: problem list.    Outpatient Medications Prior to Visit   Medication Sig Dispense Refill   • aspirin 81 MG chewable tablet Chew 81 mg Daily.     • cholecalciferol (VITAMIN D3) 10 MCG (400 UNIT) tablet Take 600 Units by mouth Daily.     • cimetidine (TAGAMET) 200 MG tablet Take 200 mg by mouth Daily As Needed.     • fluticasone (FLONASE) 50 MCG/ACT nasal spray 2 sprays into the nostril(s) as directed by provider Daily As Needed for Rhinitis or Allergies.     • Multiple Vitamins-Minerals (MULTIVITAMIN ADULT PO) Take 1 tablet by mouth Daily.     • Omega-3 Fatty Acids (FISH OIL) 1000 MG capsule capsule Take 1 tablet by mouth  Daily.     • valACYclovir (VALTREX) 500 MG tablet Take 2 tablets by mouth 2 (Two) Times a Day As Needed (fever blisters). 60 tablet 3   • vitamin C (ASCORBIC ACID) 500 MG tablet Take 1,000 mg by mouth Daily.     • acyclovir (ZOVIRAX) 5 % cream Apply  topically to the appropriate area as directed Every 3 (Three) Hours.     • loratadine (CLARITIN) 10 MG tablet Take 10 mg by mouth Daily.     • losartan-hydrochlorothiazide (HYZAAR) 100-25 MG per tablet Take 1 tablet by mouth Daily. 90 tablet 3   • lovastatin (MEVACOR) 40 MG tablet Take 1 tablet by mouth Every Night. 90 tablet 0   • metoprolol succinate XL (TOPROL-XL) 50 MG 24 hr tablet Take 1 tablet by mouth Daily. 90 tablet 3     No facility-administered medications prior to visit.        Patient Active Problem List   Diagnosis   • Hypertension   • Hyperlipidemia   • Herpes simplex   • Plantar fasciitis, bilateral   • Osteoporosis   • Plantar fasciitis   • Vitamin D deficiency   • Benign neoplasm of ascending colon   • Snoring   • Other fatigue   • Abnormal EKG   • Shortness of breath   • Palpitations   • Left bundle branch block   • Positive colorectal cancer screening using Cologuard test   • Hx of colonic polyps       Advanced Care Planning:  ACP discussion was declined by the patient. Patient has an advance directive (not in EMR), copy requested.    Review of Systems   Constitutional: Negative for activity change, appetite change, chills, diaphoresis, fatigue and fever.   Respiratory: Negative for cough, chest tightness, shortness of breath and wheezing.    Cardiovascular: Negative for chest pain, palpitations and leg swelling.   Genitourinary: Positive for frequency and urgency.       Compared to one year ago, the patient feels her physical health is the same.  Compared to one year ago, the patient feels her mental health is the same.    Reviewed chart for potential of high risk medication in the elderly: yes  Reviewed chart for potential of harmful drug  "interactions in the elderly:yes    Objective         Vitals:    05/27/20 1036   BP: 153/87   BP Location: Left arm   Patient Position: Sitting   Cuff Size: Adult   Pulse: 65   Resp: 18   Temp: 98.2 °F (36.8 °C)   TempSrc: Temporal   SpO2: 98%   Weight: 88.7 kg (195 lb 9.6 oz)   Height: 172.7 cm (68\")   PainSc: 0-No pain       Body mass index is 29.74 kg/m².  Discussed the patient's BMI with her. The BMI is above average; BMI management plan is completed.    Physical Exam   Constitutional: She appears well-developed and well-nourished. No distress.   HENT:   Right Ear: Tympanic membrane, external ear and ear canal normal.   Left Ear: Tympanic membrane, external ear and ear canal normal.   Nose: Nose normal. Right sinus exhibits no maxillary sinus tenderness and no frontal sinus tenderness. Left sinus exhibits no maxillary sinus tenderness and no frontal sinus tenderness.   Mouth/Throat: Uvula is midline and oropharynx is clear and moist. No uvula swelling.   Eyes: Conjunctivae are normal.   Neck: Neck supple. No tracheal deviation present. No thyromegaly present.   Cardiovascular: Normal rate, regular rhythm and normal heart sounds.   Pulmonary/Chest: Effort normal and breath sounds normal.   Abdominal: Soft. Bowel sounds are normal. She exhibits no abdominal bruit and no mass. There is no hepatosplenomegaly. There is no tenderness.   Lymphadenopathy:     She has no cervical adenopathy.   Neurological: She is alert.   Skin: Skin is warm and dry. Lesion noted.   Lower lip lesion, appears to be herpes simplex.   Psychiatric: She has a normal mood and affect. Her behavior is normal.   Nursing note and vitals reviewed.      Lab Results   Component Value Date     (H) 05/27/2020    CHLPL 153 05/27/2020    TRIG 193 (H) 05/27/2020    HDL 40 05/27/2020    LDL 74 05/27/2020    VLDL 39 05/27/2020        Assessment/Plan   Medicare Risks and Personalized Health Plan  CMS Preventative Services Quick Reference  Breast " Cancer/Mammogram Screening  Cardiovascular risk  Diabetic Lab Screening   Glaucoma Risk  Immunizations Discussed/Encouraged (specific immunizations; Pneumococcal 23 and Shingrix )  Obesity/Overweight   Osteoprorosis Risk  Polypharmacy    The above risks/problems have been discussed with the patient.  Pertinent information has been shared with the patient in the After Visit Summary.  Follow up plans and orders are seen below in the Assessment/Plan Section.    Diagnoses and all orders for this visit:    1. Welcome to Medicare preventive visit (Primary)    2. Essential hypertension  -     CBC & Differential  -     Comprehensive metabolic panel  -     Lipid panel    Other orders  -     Cancel: metoprolol succinate XL (Toprol XL) 100 MG 24 hr tablet; Take 1 tablet by mouth Daily.  Dispense: 90 tablet; Refill: 1      Follow Up:  Return in about 1 year (around 5/27/2021) for Annual physical.     An After Visit Summary and PPPS were given to the patient.

## 2020-05-28 DIAGNOSIS — D69.1 ABNORMAL PLATELETS (HCC): Primary | ICD-10-CM

## 2020-05-28 DIAGNOSIS — R73.01 IMPAIRED FASTING GLUCOSE: Primary | ICD-10-CM

## 2020-05-28 LAB
ALBUMIN SERPL-MCNC: 5 G/DL (ref 3.8–4.8)
ALBUMIN/GLOB SERPL: 2.1 {RATIO} (ref 1.2–2.2)
ALP SERPL-CCNC: 65 IU/L (ref 39–117)
ALT SERPL-CCNC: 42 IU/L (ref 0–32)
AST SERPL-CCNC: 35 IU/L (ref 0–40)
BASOPHILS # BLD AUTO: 0.1 X10E3/UL (ref 0–0.2)
BASOPHILS NFR BLD AUTO: 1 %
BILIRUB SERPL-MCNC: 0.7 MG/DL (ref 0–1.2)
BUN SERPL-MCNC: 16 MG/DL (ref 8–27)
BUN/CREAT SERPL: 23 (ref 12–28)
CALCIUM SERPL-MCNC: 9.8 MG/DL (ref 8.7–10.3)
CHLORIDE SERPL-SCNC: 99 MMOL/L (ref 96–106)
CHOLEST SERPL-MCNC: 153 MG/DL (ref 100–199)
CO2 SERPL-SCNC: 24 MMOL/L (ref 20–29)
CREAT SERPL-MCNC: 0.69 MG/DL (ref 0.57–1)
EOSINOPHIL # BLD AUTO: 0.1 X10E3/UL (ref 0–0.4)
EOSINOPHIL NFR BLD AUTO: 2 %
ERYTHROCYTE [DISTWIDTH] IN BLOOD BY AUTOMATED COUNT: 13.7 % (ref 11.7–15.4)
GLOBULIN SER CALC-MCNC: 2.4 G/DL (ref 1.5–4.5)
GLUCOSE SERPL-MCNC: 109 MG/DL (ref 65–99)
HCT VFR BLD AUTO: 44.2 % (ref 34–46.6)
HDLC SERPL-MCNC: 40 MG/DL
HGB BLD-MCNC: 14.4 G/DL (ref 11.1–15.9)
IMM GRANULOCYTES # BLD AUTO: 0 X10E3/UL (ref 0–0.1)
IMM GRANULOCYTES NFR BLD AUTO: 0 %
LDLC SERPL CALC-MCNC: 74 MG/DL (ref 0–99)
LYMPHOCYTES # BLD AUTO: 2.1 X10E3/UL (ref 0.7–3.1)
LYMPHOCYTES NFR BLD AUTO: 31 %
MCH RBC QN AUTO: 27.6 PG (ref 26.6–33)
MCHC RBC AUTO-ENTMCNC: 32.6 G/DL (ref 31.5–35.7)
MCV RBC AUTO: 85 FL (ref 79–97)
MONOCYTES # BLD AUTO: 0.5 X10E3/UL (ref 0.1–0.9)
MONOCYTES NFR BLD AUTO: 7 %
MORPHOLOGY BLD-IMP: ABNORMAL
NEUTROPHILS # BLD AUTO: 4 X10E3/UL (ref 1.4–7)
NEUTROPHILS NFR BLD AUTO: 59 %
PLATELET # BLD AUTO: 132 X10E3/UL (ref 150–450)
POTASSIUM SERPL-SCNC: 4.4 MMOL/L (ref 3.5–5.2)
PROT SERPL-MCNC: 7.4 G/DL (ref 6–8.5)
RBC # BLD AUTO: 5.21 X10E6/UL (ref 3.77–5.28)
SODIUM SERPL-SCNC: 141 MMOL/L (ref 134–144)
TRIGL SERPL-MCNC: 193 MG/DL (ref 0–149)
VLDLC SERPL CALC-MCNC: 39 MG/DL (ref 5–40)
WBC # BLD AUTO: 6.7 X10E3/UL (ref 3.4–10.8)

## 2020-05-29 NOTE — PATIENT INSTRUCTIONS
Medicare Wellness  Personal Prevention Plan of Service     Date of Office Visit:  2020  Encounter Provider:  MARY Renae  Place of Service:  [facility]  Patient Name: Ave Singh  :  1955    As part of the Medicare Wellness portion of your visit today, we are providing you with this personalized preventive plan of services (PPPS). This plan is based upon recommendations of the [place] Preventive Services Task Force (USPSTF) and the Advisory Committee on Immunization Practices (ACIP).    This lists the preventive care services that should be considered, and provides dates of when you are due. Items listed as completed are up-to-date and do not require any further intervention.    Health Maintenance   Topic Date Due   • ZOSTER VACCINE (1 of 2) 2004   • DXA SCAN  10/23/2018   • INFLUENZA VACCINE  2020   • MEDICARE ANNUAL WELLNESS  2021   • LIPID PANEL  2021   • MAMMOGRAM  2022   • COLOGUARD  2022   • TDAP/TD VACCINES (2 - Td) 2022   • Pneumococcal Vaccine Once at 65 Years Old  Completed   • HEPATITIS C SCREENING  Addressed   • PAP SMEAR  Discontinued       Orders Placed This Encounter   Procedures   • Comprehensive metabolic panel     Order Specific Question:   [facility] Has the patient fasted?     Answer:   Yes   • Lipid panel     Order Specific Question:   [facility] Has the patient fasted?     Answer:   Yes   • CBC & Differential     Order Specific Question:   [facility] Has the patient fasted?     Answer:   Yes       Return in about 1 year (around 2021) for Annual physical.

## 2020-06-01 ENCOUNTER — RESULTS ENCOUNTER (OUTPATIENT)
Dept: FAMILY MEDICINE CLINIC | Facility: CLINIC | Age: 65
End: 2020-06-01

## 2020-06-01 DIAGNOSIS — D69.1 ABNORMAL PLATELETS (HCC): ICD-10-CM

## 2020-06-01 DIAGNOSIS — R73.01 IMPAIRED FASTING GLUCOSE: ICD-10-CM

## 2020-06-03 LAB
HBA1C MFR BLD: 6 % (ref 4.8–5.6)
MORPHOLOGY BLD-IMP: NORMAL
PLATELET # BLD AUTO: NORMAL X10E3/UL

## 2020-06-08 NOTE — PROGRESS NOTES
Subjective    Ms. Singh is 64 y.o. female    Chief Complaint: Urinary Frequency and Urgency.     History of Present Illness     Lower Urinary tract symptoms  Patient is here for her lower urinary tract symptoms. The patient is bothered by nocturia, urgency, urge incontinence, frequency, but is without slow stream, hesitancy, intermittency.  Onset has been gradual.  The patient perceives the voided amount is Symptoms have been present for several years.  Severity is described as Moderate.  Course has been worsening. The patient perceives the amount voided is normal for the urge. Previous  history includes no significant issues.  Previous treatment includes none.         The following portions of the patient's history were reviewed and updated as appropriate: allergies, current medications, past family history, past medical history, past social history, past surgical history and problem list.    Review of Systems   Constitutional: Negative for appetite change, diaphoresis and fever.   HENT: Negative for facial swelling and sore throat.    Eyes: Negative for discharge and visual disturbance.   Respiratory: Negative for cough and shortness of breath.    Cardiovascular: Negative for chest pain and leg swelling.   Gastrointestinal: Negative for anal bleeding and vomiting.   Endocrine: Negative for cold intolerance and heat intolerance.   Genitourinary: Positive for frequency and urgency. Negative for dysuria, flank pain, hematuria and pelvic pain.   Musculoskeletal: Negative for back pain and gait problem.   Skin: Negative for pallor and rash.   Allergic/Immunologic: Negative for immunocompromised state.   Neurological: Negative for seizures and headaches.   Hematological: Negative for adenopathy. Does not bruise/bleed easily.   Psychiatric/Behavioral: Negative for dysphoric mood, self-injury and suicidal ideas.         Current Outpatient Medications:   •  acyclovir (ZOVIRAX) 5 % cream, Apply  topically to the  appropriate area as directed 4 (Four) Times a Day., Disp: 5 g, Rfl: 3  •  aspirin 81 MG chewable tablet, Chew 81 mg Daily., Disp: , Rfl:   •  cholecalciferol (VITAMIN D3) 10 MCG (400 UNIT) tablet, Take 600 Units by mouth Daily., Disp: , Rfl:   •  cimetidine (TAGAMET) 200 MG tablet, Take 200 mg by mouth Daily As Needed., Disp: , Rfl:   •  fluticasone (FLONASE) 50 MCG/ACT nasal spray, 2 sprays into the nostril(s) as directed by provider Daily As Needed for Rhinitis or Allergies., Disp: , Rfl:   •  loratadine (CLARITIN) 10 MG tablet, Take 1 tablet by mouth Daily., Disp: 90 tablet, Rfl: 2  •  losartan-hydrochlorothiazide (Hyzaar) 100-25 MG per tablet, Take 1 tablet by mouth Daily., Disp: 90 tablet, Rfl: 3  •  lovastatin (MEVACOR) 40 MG tablet, Take 1 tablet by mouth Every Night., Disp: 90 tablet, Rfl: 1  •  metoprolol succinate XL (Toprol XL) 100 MG 24 hr tablet, Take 1 tablet by mouth Daily., Disp: 90 tablet, Rfl: 1  •  Multiple Vitamins-Minerals (MULTIVITAMIN ADULT PO), Take 1 tablet by mouth Daily., Disp: , Rfl:   •  Omega-3 Fatty Acids (FISH OIL) 1000 MG capsule capsule, Take 1 tablet by mouth Daily., Disp: , Rfl:   •  valACYclovir (Valtrex) 1000 MG tablet, Use 2 tablets twice a day x1 day with outbreaks, Disp: 12 tablet, Rfl: 2  •  vitamin C (ASCORBIC ACID) 500 MG tablet, Take 1,000 mg by mouth Daily., Disp: , Rfl:   •  Zoster Vac Recomb Adjuvanted (Shingrix) 50 MCG/0.5ML reconstituted suspension, Inject 0.5 mL into the appropriate muscle as directed by prescriber Every 2 (Two) Months for 2 doses., Disp: 1 each, Rfl: 1  •  Mirabegron ER (Myrbetriq) 50 MG tablet sustained-release 24 hour 24 hr tablet, Take 50 mg by mouth Daily., Disp: 90 tablet, Rfl: 3    Past Medical History:   Diagnosis Date   • Abnormal EKG 3/13/2019   • GERD (gastroesophageal reflux disease)    • Herpes simplex    • Hyperlipidemia    • Hypertension    • Left bundle branch block 3/13/2019       Past Surgical History:   Procedure Laterality Date  "  • APPENDECTOMY  08/2008   • COLONOSCOPY N/A 5/23/2019    Procedure: COLONOSCOPY WITH ANESTHESIA;  Surgeon: Anni Smith MD;  Location: [facility] ENDOSCOPY;  Service: Gastroenterology   • HERNIA REPAIR      Done at same time as appendectomy   • HYSTERECTOMY     • LASIK  2005       Social History     Socioeconomic History   • Marital status:      Spouse name: Not on file   • Number of children: Not on file   • Years of education: Not on file   • Highest education level: Not on file   Tobacco Use   • Smoking status: Never Smoker   • Smokeless tobacco: Never Used   Substance and Sexual Activity   • Alcohol use: No   • Drug use: No   • Sexual activity: Defer       Family History   Problem Relation Age of Onset   • Cancer Mother    • Breast cancer Mother    • Heart disease Father    • Heart attack Father 62   • Hypertension Brother    • Cancer Maternal Aunt    • Cancer Maternal Grandmother    • Colon polyps Neg Hx    • Colon cancer Neg Hx        Objective    Temp 97.8 °F (36.6 °C) (Temporal)   Ht 172.7 cm (68\")   Wt 87.5 kg (192 lb 12.8 oz)   BMI 29.32 kg/m²     Physical Exam   Constitutional: She is oriented to person, place, and time. She appears well-developed and well-nourished. No distress.   HENT:   Head: Normocephalic and atraumatic.   Right Ear: External ear and ear canal normal.   Left Ear: External ear and ear canal normal.   Nose: No nasal deformity. No epistaxis.   Mouth/Throat: Oropharynx is clear and moist. Mucous membranes are not pale, not dry and not cyanotic. Normal dentition. No oropharyngeal exudate.   Neck: Trachea normal. No tracheal tenderness present. No tracheal deviation present. No thyroid mass and no thyromegaly present.   Pulmonary/Chest: Effort normal. No accessory muscle usage. No respiratory distress. Chest wall is not dull to percussion (No flatness or hyperresonance). She exhibits no mass and no tenderness.   On palpation, no tactile fremitus. All movements are symmetric. No " intercostal retraction noted.    Abdominal: Soft. Normal appearance. She exhibits no distension and no mass. There is no hepatosplenomegaly. There is no tenderness. No hernia.   Rectal examination or stool specimen is not indicated.    Musculoskeletal:   Normal gait and station. The spine, ribs, and pelvis are examined. No obvious misalignment or asymmetry. ROM is reasonable for age. No instability. No obvious atrophy, flaccidity or spasticity.    Lymphadenopathy:     She has no cervical adenopathy.        Right: No inguinal adenopathy present.        Left: No inguinal adenopathy present.   Neurological: She is alert and oriented to person, place, and time.   Skin: Skin is warm, dry and intact. No lesion and no rash noted. She is not diaphoretic. No cyanosis. No pallor. Nails show no clubbing.   On palpation, there were no induration, subcutaneous nodules, or tightening   Psychiatric: Her speech is normal and behavior is normal. Judgment and thought content normal. Her mood appears not anxious. Her affect is not labile. She does not exhibit a depressed mood.   Vitals reviewed.          Results for orders placed or performed in visit on 06/15/20   POC Urinalysis Dipstick, Multipro   Result Value Ref Range    Color Yellow Yellow, Straw, Dark Yellow, Kira    Clarity, UA Clear Clear    Glucose, UA Negative Negative, 1000 mg/dL (3+) mg/dL    Bilirubin Negative Negative    Ketones, UA Negative Negative    Specific Gravity  1.025 1.005 - 1.030    Blood, UA Negative Negative    pH, Urine 5.5 5.0 - 8.0    Protein, POC Negative Negative mg/dL    Urobilinogen, UA Normal Normal    Nitrite, UA Positive (A) Negative    Leukocytes Trace (A) Negative   Bladder Scan interpretation  Estimation of residual urine via abdominal ultrasound  Residual Urine: 13 ml  Indication: Urgency and frequency   Position: Supine  Examination: Incremental scanning of the suprapubic area using 3 MHz transducer using copious amounts of acoustic gel.    Findings: An anechoic area was demonstrated which represented the bladder, with measurement of residual urine as noted. I inspected this myself. In that the residual urine was stable or insignificant, no treatment will be necessary at this time.         Assessment and Plan    Diagnoses and all orders for this visit:    Urinary frequency  -     POC Urinalysis Dipstick, Multipro    Urinary urgency  -     POC Urinalysis Dipstick, Multipro    Urge incontinence of urine  -     Mirabegron ER (Myrbetriq) 50 MG tablet sustained-release 24 hour 24 hr tablet; Take 50 mg by mouth Daily.          Patient with frequency urgency nocturia as well as urge incontinence.  I discussed options with her.  She is adamant that she does not want to take anticholinergics due to the increased risk of dementia associated with his medication.  We will start her on Myrbetriq.  She will follow-up in 3 months.

## 2020-06-09 ENCOUNTER — TELEPHONE (OUTPATIENT)
Dept: FAMILY MEDICINE CLINIC | Facility: CLINIC | Age: 65
End: 2020-06-09

## 2020-06-09 NOTE — TELEPHONE ENCOUNTER
PATIENT CALLED TO LET TEN KNOW THAT HER BLISTER ISN'T GETTING ANY BETTER.    PATIENT WOULD ALSO LIKE TO DISCUSS HER BLOOD TEST.    PATIENT'S CALLBACK NUMBER: [phone]

## 2020-06-09 NOTE — TELEPHONE ENCOUNTER
See result note. Needs visit to discuss. And we will place referral to [facility], [facility] (with ent) or oral surgery for abnormal lip lesion. Whichever we see that and who we can get her into first.

## 2020-06-10 ENCOUNTER — OFFICE VISIT (OUTPATIENT)
Dept: FAMILY MEDICINE CLINIC | Facility: CLINIC | Age: 65
End: 2020-06-10

## 2020-06-10 VITALS
WEIGHT: 194.6 LBS | TEMPERATURE: 96.8 F | SYSTOLIC BLOOD PRESSURE: 159 MMHG | RESPIRATION RATE: 18 BRPM | BODY MASS INDEX: 29.49 KG/M2 | HEIGHT: 68 IN | OXYGEN SATURATION: 98 % | DIASTOLIC BLOOD PRESSURE: 73 MMHG | HEART RATE: 67 BPM

## 2020-06-10 DIAGNOSIS — B00.9 HERPES SIMPLEX: ICD-10-CM

## 2020-06-10 DIAGNOSIS — K13.0 LIP LESION: ICD-10-CM

## 2020-06-10 DIAGNOSIS — D69.1 ABNORMAL PLATELETS (HCC): Primary | ICD-10-CM

## 2020-06-10 DIAGNOSIS — R73.03 PREDIABETES: ICD-10-CM

## 2020-06-10 DIAGNOSIS — I10 ESSENTIAL HYPERTENSION: Chronic | ICD-10-CM

## 2020-06-10 PROCEDURE — 99214 OFFICE O/P EST MOD 30 MIN: CPT | Performed by: NURSE PRACTITIONER

## 2020-06-10 RX ORDER — ACYCLOVIR 50 MG/G
CREAM TOPICAL 4 TIMES DAILY
Qty: 5 G | Refills: 3 | Status: SHIPPED | OUTPATIENT
Start: 2020-06-10

## 2020-06-10 RX ORDER — VALACYCLOVIR HYDROCHLORIDE 1 G/1
TABLET, FILM COATED ORAL
Qty: 12 TABLET | Refills: 2 | Status: SHIPPED | OUTPATIENT
Start: 2020-06-10 | End: 2021-09-17 | Stop reason: SDUPTHER

## 2020-06-10 NOTE — PATIENT INSTRUCTIONS
Prediabetes Eating Plan  Prediabetes is a condition that causes blood sugar (glucose) levels to be higher than normal. This increases the risk for developing diabetes. In order to prevent diabetes from developing, your health care provider may recommend a diet and other lifestyle changes to help you:  · Control your blood glucose levels.  · Improve your cholesterol levels.  · Manage your blood pressure.  Your health care provider may recommend working with a diet and nutrition specialist (dietitian) to make a meal plan that is best for you.  What are tips for following this plan?  Lifestyle  · Set weight loss goals with the help of your health care team. It is recommended that most people with prediabetes lose 7% of their current body weight.  · Exercise for at least 30 minutes at least 5 days a week.  · Attend a support group or seek ongoing support from a mental health counselor.  · Take over-the-counter and prescription medicines only as told by your health care provider.  Reading food labels  · Read food labels to check the amount of fat, salt (sodium), and sugar in prepackaged foods. Avoid foods that have:  ? Saturated fats.  ? Trans fats.  ? Added sugars.  · Avoid foods that have more than 300 milligrams (mg) of sodium per serving. Limit your daily sodium intake to less than 2,300 mg each day.  Shopping  · Avoid buying pre-made and processed foods.  Cooking  · Cook with olive oil. Do not use butter, lard, or ghee.  · Bake, broil, grill, or boil foods. Avoid frying.  Meal planning    · Work with your dietitian to develop an eating plan that is right for you. This may include:  ? Tracking how many calories you take in. Use a food diary, notebook, or mobile application to track what you eat at each meal.  ? Using the glycemic index (GI) to plan your meals. The index tells you how quickly a food will raise your blood glucose. Choose low-GI foods. These foods take a longer time to raise blood glucose.  · Consider  following a Mediterranean diet. This diet includes:  ? Several servings each day of fresh fruits and vegetables.  ? Eating fish at least twice a week.  ? Several servings each day of whole grains, beans, nuts, and seeds.  ? Using olive oil instead of other fats.  ? Moderate alcohol consumption.  ? Eating small amounts of red meat and whole-fat dairy.  · If you have high blood pressure, you may need to limit your sodium intake or follow a diet such as the DASH eating plan. DASH is an eating plan that aims to lower high blood pressure.  What foods are recommended?  The items listed below may not be a complete list. Talk with your dietitian about what dietary choices are best for you.  Grains  Whole grains, such as whole-wheat or whole-grain breads, crackers, cereals, and pasta. Unsweetened oatmeal. Bulgur. Barley. Quinoa. Brown rice. Corn or whole-wheat flour tortillas or taco shells.  Vegetables  Lettuce. Spinach. Peas. Beets. Cauliflower. Cabbage. Broccoli. Carrots. Tomatoes. Squash. Eggplant. Herbs. Peppers. Onions. Cucumbers. [place] sprouts.  Fruits  Berries. Bananas. Apples. Oranges. Grapes. Papaya. Magazine. Pomegranate. Kiwi. Grapefruit. Cherries.  Meats and other protein foods  Seafood. Poultry without skin. Lean cuts of pork and beef. Tofu. Eggs. Nuts. Beans.  Dairy  Low-fat or fat-free dairy products, such as yogurt, cottage cheese, and cheese.  Beverages  Water. Tea. Coffee. Sugar-free or diet soda. [place] water. Lowfat or no-fat milk. Milk alternatives, such as soy or almond milk.  Fats and oils  Olive oil. Canola oil. Sunflower oil. Grapeseed oil. Avocado. Walnuts.  Sweets and desserts  Sugar-free or low-fat pudding. Sugar-free or low-fat ice cream and other frozen treats.  Seasoning and other foods  Herbs. Sodium-free spices. Mustard. Relish. Low-fat, low-sugar ketchup. Low-fat, low-sugar barbecue sauce. Low-fat or fat-free mayonnaise.  What foods are not recommended?  The items listed below may not be a  complete list. Talk with your dietitian about what dietary choices are best for you.  Grains  Refined white flour and flour products, such as bread, pasta, snack foods, and cereals.  Vegetables  Canned vegetables. Frozen vegetables with butter or cream sauce.  Fruits  Fruits canned with syrup.  Meats and other protein foods  Fatty cuts of meat. Poultry with skin. Breaded or fried meat. Processed meats.  Dairy  Full-fat yogurt, cheese, or milk.  Beverages  Sweetened drinks, such as sweet iced tea and soda.  Fats and oils  Butter. Lard. Ghee.  Sweets and desserts  Baked goods, such as cake, cupcakes, pastries, cookies, and cheesecake.  Seasoning and other foods  Spice mixes with added salt. Ketchup. Barbecue sauce. Mayonnaise.  Summary  · To prevent diabetes from developing, you may need to make diet and other lifestyle changes to help control blood sugar, improve cholesterol levels, and manage your blood pressure.  · Set weight loss goals with the help of your health care team. It is recommended that most people with prediabetes lose 7 percent of their current body weight.  · Consider following a Mediterranean diet that includes plenty of fresh fruits and vegetables, whole grains, beans, nuts, seeds, fish, lean meat, low-fat dairy, and healthy oils.  This information is not intended to replace advice given to you by your health care provider. Make sure you discuss any questions you have with your health care provider.  Document Released: 05/03/2016 Document Revised: 04/10/2020 Document Reviewed: 02/21/2018  Elsevier Patient Education © 2020 [employer]

## 2020-06-11 ENCOUNTER — HOSPITAL ENCOUNTER (OUTPATIENT)
Dept: BONE DENSITY | Facility: HOSPITAL | Age: 65
Discharge: HOME | End: 2020-06-11
Admitting: NURSE PRACTITIONER

## 2020-06-11 ENCOUNTER — TELEPHONE (OUTPATIENT)
Dept: OTOLARYNGOLOGY | Facility: CLINIC | Age: 65
End: 2020-06-11

## 2020-06-11 DIAGNOSIS — N95.1 MENOPAUSAL AND FEMALE CLIMACTERIC STATES: ICD-10-CM

## 2020-06-11 DIAGNOSIS — Z13.820 OSTEOPOROSIS SCREENING: ICD-10-CM

## 2020-06-11 LAB
BASOPHILS # BLD AUTO: 0.1 X10E3/UL (ref 0–0.2)
BASOPHILS NFR BLD AUTO: 1 %
EOSINOPHIL # BLD AUTO: 0.2 X10E3/UL (ref 0–0.4)
EOSINOPHIL NFR BLD AUTO: 2 %
ERYTHROCYTE [DISTWIDTH] IN BLOOD BY AUTOMATED COUNT: 13.6 % (ref 11.7–15.4)
HCT VFR BLD AUTO: 41.5 % (ref 34–46.6)
HGB BLD-MCNC: 13.7 G/DL (ref 11.1–15.9)
IMM GRANULOCYTES # BLD AUTO: 0 X10E3/UL (ref 0–0.1)
IMM GRANULOCYTES NFR BLD AUTO: 0 %
LYMPHOCYTES # BLD AUTO: 2.7 X10E3/UL (ref 0.7–3.1)
LYMPHOCYTES NFR BLD AUTO: 32 %
MCH RBC QN AUTO: 28.2 PG (ref 26.6–33)
MCHC RBC AUTO-ENTMCNC: 33 G/DL (ref 31.5–35.7)
MCV RBC AUTO: 86 FL (ref 79–97)
MONOCYTES # BLD AUTO: 0.6 X10E3/UL (ref 0.1–0.9)
MONOCYTES NFR BLD AUTO: 7 %
NEUTROPHILS # BLD AUTO: 5 X10E3/UL (ref 1.4–7)
NEUTROPHILS NFR BLD AUTO: 58 %
PLATELET # BLD AUTO: 192 X10E3/UL (ref 150–450)
RBC # BLD AUTO: 4.85 X10E6/UL (ref 3.77–5.28)
WBC # BLD AUTO: 8.5 X10E3/UL (ref 3.4–10.8)

## 2020-06-11 PROCEDURE — 77080 DXA BONE DENSITY AXIAL: CPT

## 2020-06-11 NOTE — TELEPHONE ENCOUNTER
I spoke to patient in regards to her lip lesion and she will like to wait and will call if there is no improvement.

## 2020-06-15 ENCOUNTER — OFFICE VISIT (OUTPATIENT)
Dept: UROLOGY | Facility: CLINIC | Age: 65
End: 2020-06-15

## 2020-06-15 VITALS — TEMPERATURE: 97.8 F | BODY MASS INDEX: 29.22 KG/M2 | HEIGHT: 68 IN | WEIGHT: 192.8 LBS

## 2020-06-15 DIAGNOSIS — N39.41 URGE INCONTINENCE OF URINE: ICD-10-CM

## 2020-06-15 DIAGNOSIS — R39.15 URINARY URGENCY: ICD-10-CM

## 2020-06-15 DIAGNOSIS — R35.0 URINARY FREQUENCY: Primary | ICD-10-CM

## 2020-06-15 LAB
BILIRUB BLD-MCNC: NEGATIVE MG/DL
CLARITY, POC: CLEAR
COLOR UR: YELLOW
GLUCOSE UR STRIP-MCNC: NEGATIVE MG/DL
KETONES UR QL: NEGATIVE
LEUKOCYTE EST, POC: ABNORMAL
NITRITE UR-MCNC: POSITIVE MG/ML
PH UR: 5.5 [PH] (ref 5–8)
PROT UR STRIP-MCNC: NEGATIVE MG/DL
RBC # UR STRIP: NEGATIVE /UL
SP GR UR: 1.02 (ref 1–1.03)
UROBILINOGEN UR QL: NORMAL

## 2020-06-15 PROCEDURE — 99204 OFFICE O/P NEW MOD 45 MIN: CPT | Performed by: UROLOGY

## 2020-06-15 PROCEDURE — 81003 URINALYSIS AUTO W/O SCOPE: CPT | Performed by: UROLOGY

## 2020-06-15 PROCEDURE — 51798 US URINE CAPACITY MEASURE: CPT | Performed by: UROLOGY

## 2020-06-26 NOTE — PROGRESS NOTES
Subjective   Ave Singh is a 64 y.o. female presents in clinic for follow up on hypertension, lip lesion as well as to discuss lab results.     History of Present Illness   Hypertension  Chronic. Uncontrolled. Very anxious today to discuss lab results. Tolerating increase in toprol at previous visit. Blood pressure running better at home. Denies chest pain, palpitations, swelling, shortness of breath.     Lip lesion  Subacute. Left lower lip. Still present from visit weeks ago. Not painful. New herpes simplex lesion on right lower lip now as well for a few days. Has been using zovirax.     Lab results:  Thrombocytopenia- new, needs recheck platelet count. No symptoms of excessive bleeding or bruising. No history of hematologic disorders.     Elevated glucose and prediabetes  New problem. a1c 6.0. Reports she could do better on diet and exercise.     Seeing urology in a few days for urinary issues.     The following portions of the patient's history were reviewed and updated as appropriate: allergies, current medications, past family history, past medical history, past social history, past surgical history and problem list.    Review of Systems   Constitutional: Negative for activity change, appetite change, chills, diaphoresis, fatigue and fever.   Respiratory: Negative for cough, shortness of breath and wheezing.    Cardiovascular: Negative for chest pain, palpitations and leg swelling.   Genitourinary: Positive for frequency and urgency. Negative for difficulty urinating, dysuria and hematuria.   Skin: Positive for wound (lower lip lesion).   Neurological: Negative for dizziness, syncope and light-headedness.       Objective     Vitals:    06/10/20 1502   BP: 159/73   Pulse: 67   Resp: 18   Temp: 96.8 °F (36 °C)   SpO2: 98%       Physical Exam   Constitutional: She appears well-developed and well-nourished. No distress.   HENT:   Right Ear: External ear normal.   Left Ear: External ear normal.   Nose:  Nose normal.   Mouth/Throat: Oropharynx is clear and moist. Oral lesions present.       Eyes: Conjunctivae are normal.   Neck: Neck supple. No thyromegaly present.   Cardiovascular: Normal rate, regular rhythm and normal heart sounds.   Pulmonary/Chest: Effort normal and breath sounds normal.   Abdominal: Soft. Bowel sounds are normal. She exhibits no abdominal bruit and no mass. There is no hepatosplenomegaly. There is no tenderness.   Lymphadenopathy:     She has no cervical adenopathy.   Neurological: She is alert.   Skin: Skin is warm and dry.   Psychiatric: She has a normal mood and affect.   Nursing note and vitals reviewed.         Patient's Body mass index is 29.6 kg/m². BMI is above normal parameters. Recommendations include: exercise counseling and nutrition counseling.       Assessment/Plan   Ave was seen today for hypertension.    Diagnoses and all orders for this visit:    Abnormal platelets (CMS/HCC)  -     CBC & Differential    Lip lesion  -     Ambulatory Referral to ENT (Otolaryngology)    Prediabetes    Herpes simplex    Essential hypertension    Other orders  -     acyclovir (ZOVIRAX) 5 % cream; Apply  topically to the appropriate area as directed 4 (Four) Times a Day.  -     valACYclovir (Valtrex) 1000 MG tablet; Use 2 tablets twice a day x1 day with outbreaks      Plan:  Hypertension-  Continue toprol xl 100mg at higher dose. Monitor blood pressure.     Prediabetes  Lengthy discussion on this as well as lifestyle modifications including decreasing concentrated sweets and simple carbohydrate as well as increasing activity. Information provided for patient.     Thrombocytopenia   Recheck cbc today, platelet recheck hemolyzed at previous draw.     Lip lesion  We will refer to ent or derm for further eval. Refill zovirax and increase dose of valtrex to 2000 mg bid x1 day for any outbreaks.     F/u urology as scheduled for urinary issues.     Return in about 3 months (around 9/10/2020) for  Recheck.             Electronically signed by MARY Renae, 06/26/20, 8:10 AM.

## 2020-08-19 ENCOUNTER — OFFICE VISIT (OUTPATIENT)
Dept: OTOLARYNGOLOGY | Facility: CLINIC | Age: 65
End: 2020-08-19

## 2020-08-19 VITALS
WEIGHT: 188 LBS | HEART RATE: 69 BPM | TEMPERATURE: 97.4 F | SYSTOLIC BLOOD PRESSURE: 148 MMHG | DIASTOLIC BLOOD PRESSURE: 75 MMHG | BODY MASS INDEX: 28.59 KG/M2

## 2020-08-19 DIAGNOSIS — B00.9 HERPES SIMPLEX: ICD-10-CM

## 2020-08-19 DIAGNOSIS — L81.4 SOLAR LENTIGO: Primary | ICD-10-CM

## 2020-08-19 PROCEDURE — 99203 OFFICE O/P NEW LOW 30 MIN: CPT | Performed by: OTOLARYNGOLOGY

## 2020-08-19 NOTE — PROGRESS NOTES
PRIMARY CARE PROVIDER: Yolanda Grant, DNP, APRN  REFERRING PROVIDER: No ref. provider found    Chief Complaint   Patient presents with   • Oral Swelling     Small area to left side of bottom lip that started around May       Subjective   History of Present Illness:  Ave Singh is a  64 y.o. female who presents for consultation regarding a skin lesion of the left lower lip.  The lesion has the following characteristics:    Location: Left lower lip  Quality: Fever blister  Severity: Moderate  Duration: 6 weeks, but not resolved  Timing: constant  Modifying Factors: Sunburns cause reactivation of her fever blisters  Associated Signs & Symptoms: She has a slight amount of brown discoloration in the area.    Essentially, Mrs. Fried has a history of recurrent fever blisters.  She typically will take Valtrex at the onset, and they resolve quickly.  She forgot her SPF 45 for her lips a few months ago, and had a reactivation of the virus.  She had a cold sore at the left upper lip that persisted for approximately 2 weeks; one on the left lower lip that lasted 6 weeks.  She does have a very small amount of brown discoloration of the lower lip that has been present for many years.  This has not changed.    Review of Systems:  Review of Systems   Constitutional: Negative for chills, fatigue, fever and unexpected weight change.   HENT: Negative for facial swelling.    Respiratory: Negative for cough, chest tightness and shortness of breath.    Cardiovascular: Negative for chest pain.   Musculoskeletal: Negative for neck pain.   Skin: Negative for color change.   Neurological: Negative for facial asymmetry.   Hematological: Negative for adenopathy. Does not bruise/bleed easily.       Past History:  Past Medical History:   Diagnosis Date   • Abnormal EKG 3/13/2019   • GERD (gastroesophageal reflux disease)    • Herpes simplex    • Hyperlipidemia    • Hypertension    • Left bundle branch block 3/13/2019     Past  Surgical History:   Procedure Laterality Date   • APPENDECTOMY  08/2008   • COLONOSCOPY N/A 5/23/2019    Procedure: COLONOSCOPY WITH ANESTHESIA;  Surgeon: Anni Smith MD;  Location: [facility] ENDOSCOPY;  Service: Gastroenterology   • HERNIA REPAIR      Done at same time as appendectomy   • HYSTERECTOMY     • LASIK  2005     Family History   Problem Relation Age of Onset   • Cancer Mother    • Breast cancer Mother    • Heart disease Father    • Heart attack Father 62   • Hypertension Brother    • Cancer Maternal Aunt    • Cancer Maternal Grandmother    • Colon polyps Neg Hx    • Colon cancer Neg Hx      Social History     Tobacco Use   • Smoking status: Never Smoker   • Smokeless tobacco: Never Used   Substance Use Topics   • Alcohol use: No   • Drug use: No     Allergies:  Amoxicillin and Penicillins    Current Outpatient Medications:   •  acyclovir (ZOVIRAX) 5 % cream, Apply  topically to the appropriate area as directed 4 (Four) Times a Day., Disp: 5 g, Rfl: 3  •  aspirin 81 MG chewable tablet, Chew 81 mg Daily., Disp: , Rfl:   •  cholecalciferol (VITAMIN D3) 10 MCG (400 UNIT) tablet, Take 600 Units by mouth Daily., Disp: , Rfl:   •  cimetidine (TAGAMET) 200 MG tablet, Take 200 mg by mouth Daily As Needed., Disp: , Rfl:   •  fluticasone (FLONASE) 50 MCG/ACT nasal spray, 2 sprays into the nostril(s) as directed by provider Daily As Needed for Rhinitis or Allergies., Disp: , Rfl:   •  loratadine (CLARITIN) 10 MG tablet, Take 1 tablet by mouth Daily., Disp: 90 tablet, Rfl: 2  •  losartan-hydrochlorothiazide (Hyzaar) 100-25 MG per tablet, Take 1 tablet by mouth Daily., Disp: 90 tablet, Rfl: 3  •  lovastatin (MEVACOR) 40 MG tablet, Take 1 tablet by mouth Every Night., Disp: 90 tablet, Rfl: 1  •  metoprolol succinate XL (Toprol XL) 100 MG 24 hr tablet, Take 1 tablet by mouth Daily., Disp: 90 tablet, Rfl: 1  •  Mirabegron ER (Myrbetriq) 50 MG tablet sustained-release 24 hour 24 hr tablet, Take 50 mg by mouth Daily.,  Disp: 90 tablet, Rfl: 3  •  Multiple Vitamins-Minerals (MULTIVITAMIN ADULT PO), Take 1 tablet by mouth Daily., Disp: , Rfl:   •  Omega-3 Fatty Acids (FISH OIL) 1000 MG capsule capsule, Take 1 tablet by mouth Daily., Disp: , Rfl:   •  valACYclovir (Valtrex) 1000 MG tablet, Use 2 tablets twice a day x1 day with outbreaks, Disp: 12 tablet, Rfl: 2  •  vitamin C (ASCORBIC ACID) 500 MG tablet, Take 1,000 mg by mouth Daily., Disp: , Rfl:     Objective     Vital Signs:  Temp:  [97.4 °F (36.3 °C)] 97.4 °F (36.3 °C)  Heart Rate:  [69] 69  BP: (148)/(75) 148/75    Physical Exam:  Physical Exam   Constitutional: She is oriented to person, place, and time. She appears well-developed and well-nourished. She is cooperative. No distress.   HENT:   Head: Normocephalic and atraumatic.   Right Ear: External ear normal.   Left Ear: External ear normal.   Nose: Nose normal.   Mouth/Throat:       Eyes: Pupils are equal, round, and reactive to light. Conjunctivae and EOM are normal. Right eye exhibits no discharge. Left eye exhibits no discharge. No scleral icterus.   Neck: Normal range of motion. Neck supple. No JVD present. No tracheal deviation present. No thyromegaly present.   Pulmonary/Chest: Effort normal. No stridor.   Musculoskeletal: Normal range of motion. She exhibits no edema or deformity.   Lymphadenopathy:     She has no cervical adenopathy.   Neurological: She is alert and oriented to person, place, and time. She has normal strength. No cranial nerve deficit. Coordination normal.   Skin: Skin is warm and dry. No rash noted. She is not diaphoretic. No erythema. No pallor.   Psychiatric: She has a normal mood and affect. Her speech is normal and behavior is normal. Judgment and thought content normal. Cognition and memory are normal.   Nursing note and vitals reviewed.      Assessment   Assessment:  1. Solar lentigo    2. Herpes simplex        Plan   Plan:  She was initially sent due to the 6-week history of an ulceration of  the lower lip.  This has since resolved, and is consistent with a fever blister.  I have recommended Valtrex as needed for recurrent fever blisters.  She understands that, in her situation, prevention is the best medicine.  She should continue the SPF 45 when out in the sun.  If a lesion returns, and persist for more than approximately 6 weeks, she should call for follow-up.  The lower lip lesion is consistent with a solar lentigo/ephilis.  This does not appear concerning enough for biopsy.    My findings and recommendations were discussed and questions were answered.     Familia Ridley MD  08/19/20  13:49

## 2020-08-25 ENCOUNTER — TELEPHONE (OUTPATIENT)
Dept: FAMILY MEDICINE CLINIC | Facility: CLINIC | Age: 65
End: 2020-08-25

## 2020-08-25 NOTE — TELEPHONE ENCOUNTER
PATIENT STATES THAT [employer] FAXED OVER [employer] BUT STILL HASN'T HEARD OR RECEIVED ANY RESPONSES BACK TO THESE.

## 2020-08-25 NOTE — TELEPHONE ENCOUNTER
Spoke with pt and informed her this had been sent.   She said they had not received it and could it be sent again.   Esha to notify them to refax.

## 2020-09-15 ENCOUNTER — OFFICE VISIT (OUTPATIENT)
Dept: FAMILY MEDICINE CLINIC | Facility: CLINIC | Age: 65
End: 2020-09-15

## 2020-09-15 VITALS
OXYGEN SATURATION: 98 % | SYSTOLIC BLOOD PRESSURE: 131 MMHG | RESPIRATION RATE: 18 BRPM | BODY MASS INDEX: 28.61 KG/M2 | DIASTOLIC BLOOD PRESSURE: 80 MMHG | WEIGHT: 188.8 LBS | TEMPERATURE: 96.9 F | HEART RATE: 63 BPM | HEIGHT: 68 IN

## 2020-09-15 DIAGNOSIS — I10 ESSENTIAL HYPERTENSION: Primary | Chronic | ICD-10-CM

## 2020-09-15 DIAGNOSIS — R73.01 IMPAIRED FASTING GLUCOSE: ICD-10-CM

## 2020-09-15 LAB — HBA1C MFR BLD: 5.9 %

## 2020-09-15 PROCEDURE — 83036 HEMOGLOBIN GLYCOSYLATED A1C: CPT | Performed by: NURSE PRACTITIONER

## 2020-09-15 PROCEDURE — 99214 OFFICE O/P EST MOD 30 MIN: CPT | Performed by: NURSE PRACTITIONER

## 2020-09-15 RX ORDER — METOPROLOL SUCCINATE 100 MG/1
100 TABLET, EXTENDED RELEASE ORAL DAILY
Qty: 90 TABLET | Refills: 1 | Status: SHIPPED | OUTPATIENT
Start: 2020-09-15 | End: 2021-03-12 | Stop reason: SDUPTHER

## 2020-09-15 RX ORDER — LOVASTATIN 40 MG/1
40 TABLET ORAL NIGHTLY
Qty: 90 TABLET | Refills: 1 | Status: SHIPPED | OUTPATIENT
Start: 2020-09-15 | End: 2021-03-12 | Stop reason: SDUPTHER

## 2020-09-15 RX ORDER — METOPROLOL SUCCINATE 100 MG/1
100 TABLET, EXTENDED RELEASE ORAL DAILY
Qty: 90 TABLET | Refills: 1 | Status: SHIPPED | OUTPATIENT
Start: 2020-09-15 | End: 2020-09-15

## 2020-09-15 RX ORDER — LOSARTAN POTASSIUM AND HYDROCHLOROTHIAZIDE 25; 100 MG/1; MG/1
1 TABLET ORAL DAILY
Qty: 90 TABLET | Refills: 1 | Status: SHIPPED | OUTPATIENT
Start: 2020-09-15 | End: 2021-03-12 | Stop reason: SDUPTHER

## 2020-09-15 NOTE — PROGRESS NOTES
CC: HTN, hyperlipidemia, impaired fasting glucose.     Ave Singh is a 64 yr old female here for follow up for HTN, hyperlipidemia and impaired fasting glucose. Has been monitoring bp at home prior 134/71; 146/73; 116/69; 135/76; 111/66, 119/72; 131/74; 138/68; 125/70; 127/72; 116/68; 128/68;127/77; 128/67; 137/74; 118/66.     Hypertension  This is a chronic problem. The current episode started more than 1 year ago. The problem has been gradually improving since onset. The problem is controlled. Pertinent negatives include no chest pain, palpitations or shortness of breath. There are no associated agents to hypertension. Risk factors for coronary artery disease include dyslipidemia, family history and obesity. Past treatments include ACE inhibitors, beta blockers and diuretics. Current antihypertension treatment includes beta blockers, diuretics and lifestyle changes. The current treatment provides moderate improvement. There are no compliance problems.  There is no history of angina, kidney disease, CAD/MI, CVA, heart failure, left ventricular hypertrophy, PVD or retinopathy. There is no history of chronic renal disease, coarctation of the aorta, hyperaldosteronism, hypercortisolism, pheochromocytoma, renovascular disease or sleep apnea.   Hyperlipidemia  This is a chronic problem. The current episode started more than 1 year ago. Recent lipid tests were reviewed and are normal. She has no history of chronic renal disease, diabetes, hypothyroidism, liver disease or nephrotic syndrome. Factors aggravating her hyperlipidemia include beta blockers. Pertinent negatives include no chest pain or shortness of breath. Current antihyperlipidemic treatment includes statins. The current treatment provides mild improvement of lipids. There are no compliance problems.  Risk factors for coronary artery disease include dyslipidemia and hypertension.        The following portions of the patient's history were reviewed and  updated as appropriate: allergies, current medications, past family history, past medical history, past social history, past surgical history and problem list.    Review of Systems   Constitutional: Negative.    HENT: Negative.    Respiratory: Negative for cough, chest tightness and shortness of breath.    Cardiovascular: Negative for chest pain, palpitations and leg swelling.   Musculoskeletal: Negative.    Allergic/Immunologic: Negative.    Hematological: Negative.    All other systems reviewed and are negative.      Objective   Physical Exam  Vitals signs and nursing note reviewed.   Constitutional:       General: She is awake.      Appearance: Normal appearance. She is overweight.   HENT:      Head: Normocephalic and atraumatic.      Right Ear: Hearing, tympanic membrane, ear canal and external ear normal.      Left Ear: Hearing, tympanic membrane, ear canal and external ear normal.      Nose: Nose normal.      Right Sinus: No maxillary sinus tenderness or frontal sinus tenderness.      Left Sinus: No maxillary sinus tenderness or frontal sinus tenderness.      Mouth/Throat:      Mouth: Mucous membranes are moist.      Pharynx: Oropharynx is clear.   Eyes:      Conjunctiva/sclera: Conjunctivae normal.   Neck:      Musculoskeletal: Normal range of motion and neck supple.   Cardiovascular:      Rate and Rhythm: Normal rate and regular rhythm.      Heart sounds: Normal heart sounds.   Pulmonary:      Effort: Pulmonary effort is normal.      Breath sounds: Normal breath sounds and air entry.   Abdominal:      General: Abdomen is flat.      Palpations: Abdomen is soft.   Lymphadenopathy:      Head:      Right side of head: No submental, submandibular or tonsillar adenopathy.      Left side of head: No submental, submandibular or tonsillar adenopathy.   Skin:     General: Skin is warm.   Neurological:      General: No focal deficit present.      Mental Status: She is alert.      Cranial Nerves: Cranial nerves are  intact.      Sensory: Sensation is intact.      Motor: Motor function is intact.      Coordination: Coordination is intact.   Psychiatric:         Attention and Perception: Attention and perception normal.         Mood and Affect: Mood and affect normal.         Speech: Speech normal.         Behavior: Behavior normal. Behavior is cooperative.         Cognition and Memory: Cognition and memory normal.           Assessment/Plan   Ave was seen today for hypertension, hyperlipidemia and impaired fasting glucose.    Diagnoses and all orders for this visit:    Essential hypertension  -     losartan-hydrochlorothiazide (Hyzaar) 100-25 MG per tablet; Take 1 tablet by mouth Daily. Delete existing refills.  -     metoprolol succinate XL (Toprol XL) 100 MG 24 hr tablet; Take 1 tablet by mouth Daily. Delete existing refills.  -     lovastatin (MEVACOR) 40 MG tablet; Take 1 tablet by mouth Every Night. Delete existing refills.    Impaired fasting glucose  -     POC Glycosylated Hemoglobin (Hb A1C)  POC Glycosylated Hemoglobin (Hb A1C)  Order: [id number]  Status:  Final result   Visible to patient:  No (not released)   Next appt:  03/12/2021 at 09:15 AM in Family Medicine (Yolanda Grant DNP, APRN)   Dx:  Impaired fasting glucose  Specimen Information: Blood        Component   Ref Range & Units 08:49   Hemoglobin A1C   % 5.9    Resulting Agency [facility]         Specimen Collected: 09/15/20 08:49 Last Resulted: 09/15               Return in about 6 months (around 3/15/2021) for Recheck chronic.    Electronically signed by Yolanda Grant DNP, APRN, 09/15/20, 8:40 AM CDT.

## 2020-10-12 NOTE — PROGRESS NOTES
CC: HTN      Ave Singh is here to follow up on blood pressure.  She brings readings in:  135/81, 146/82, 147/88,139/78,138/82, 124/75, 121/77,128/82.  Says it has  Been doing really good.  Today, however it is elevated 159/80.  It was taken with automatic cuff, recheck manually is 140/76.  She does not want to increase the medication because bp has been doing good the last couple days.       Hypertension   This is a chronic problem. The current episode started more than 1 month ago. The problem is unchanged. The problem is controlled. Pertinent negatives include no blurred vision, chest pain, headaches, neck pain, orthopnea, palpitations, PND, shortness of breath or sweats. There are no associated agents to hypertension. Risk factors for coronary artery disease include dyslipidemia and family history. Past treatments include ACE inhibitors and diuretics. Current antihypertension treatment includes ACE inhibitors and beta blockers. The current treatment provides mild improvement. There are no compliance problems.  There is no history of angina, kidney disease, CAD/MI, CVA, heart failure, left ventricular hypertrophy, PVD or retinopathy. There is no history of coarctation of the aorta, hyperaldosteronism, hypercortisolism, pheochromocytoma, renovascular disease or sleep apnea.        Chronic problems:  Chronic problems: HTN stable with atenolol and hctz, hyperlipidemia stable with lovastatin, plantar fasciitis stable with naproxen, herpes simplex valacyclovir, osteoporosis       The following portions of the patient's history were reviewed and updated as appropriate: allergies, current medications, past family history, past medical history, past social history, past surgical history and problem list.    Review of Systems   Constitutional: Negative for activity change and appetite change.   Eyes: Negative for blurred vision.   Respiratory: Negative for shortness of breath.    Cardiovascular: Negative for  Subjective:      Patient ID: Vandana Ashby is a 21 y.o. male who presents today for:     Chief Complaint   Patient presents with    Urinary Tract Infection       Urinary Tract Infection    This is a new problem. The current episode started in the past 7 days (last wednesday). The problem has been unchanged. Quality: cramping. The patient is experiencing no pain. There has been no fever. Associated symptoms include flank pain (midleft), frequency and urgency. Pertinent negatives include no chills, discharge, hematuria, hesitancy, nausea, sweats or vomiting. He has tried NSAIDs (cranberry pills) for the symptoms. The treatment provided mild relief. Pt also ate dinner with client in [facility] on Wednesday 10/7 and then Saturday he found out that client had COVID-19. He denies any symptoms, but already told work and [place] [facility] [place] Medical History:   Diagnosis Date    Abdominal cramping     Anxiety     Depression      Past Surgical History:   Procedure Laterality Date    NE ESOPHAGOGASTRODUODENOSCOPY TRANSORAL DIAGNOSTIC N/A 5/24/2017    EGD ESOPHAGOGASTRODUODENOSCOPY WITH DILATION performed by Jody Roth MD at [address] [place] [address]  5/6/13    DR. ANDREW    UPPER GASTROINTESTINAL ENDOSCOPY  2/29/16    w/bx,dilation     UPPER GASTROINTESTINAL ENDOSCOPY N/A 7/23/2019    EGD ESOPHAGOGASTRODUODENOSCOPY performed by Anoop Ochoa MD at [place] [address]       Family History   Problem Relation Age of Onset    Asthma Mother     High Blood Pressure Father      Social History     Socioeconomic History    Marital status: Single     Spouse name: Not on file    Number of children: Not on file    Years of education: Not on file    Highest education level: Not on file   Occupational History    Not on file   Social Needs    Financial resource strain: Not on file    Food insecurity     Worry: Not on file Inability: Not on file    Transportation needs     Medical: Not on file     Non-medical: Not on file   Tobacco Use    Smoking status: Former Smoker     Types: Cigarettes     Last attempt to quit: 3/24/2017     Years since quitting: 3.5    Smokeless tobacco: Never Used   Substance and Sexual Activity    Alcohol use: Yes     Comment: occasionally    Drug use: No    Sexual activity: Not on file   Lifestyle    Physical activity     Days per week: Not on file     Minutes per session: Not on file    Stress: Not on file   Relationships    Social connections     Talks on phone: Not on file     Gets together: Not on file     Attends Oriental orthodox service: Not on file     Active member of club or organization: Not on file     Attends meetings of clubs or organizations: Not on file     Relationship status: Not on file    Intimate partner violence     Fear of current or ex partner: Not on file     Emotionally abused: Not on file     Physically abused: Not on file     Forced sexual activity: Not on file   Other Topics Concern    Not on file   Social History Narrative    Not on file     Current Outpatient Medications on File Prior to Visit   Medication Sig Dispense Refill    omeprazole (PRILOSEC) 20 MG delayed release capsule Take 1 capsule by mouth daily 90 capsule 5     No current facility-administered medications on file prior to visit. Allergies:  Patient has no known allergies. Review of Systems   Constitutional: Negative for chills, fatigue and fever. Gastrointestinal: Negative for nausea and vomiting. Genitourinary: Positive for dysuria, flank pain (midleft), frequency and urgency. Negative for hematuria, hesitancy and testicular pain. Objective: There were no vitals taken for this visit. Physical Exam  Constitutional:       General: He is awake. He is not in acute distress. Appearance: Normal appearance. He is not ill-appearing, toxic-appearing or diaphoretic.    HENT:      Head: chest pain, palpitations, orthopnea and PND.   Endocrine: Negative for polydipsia, polyphagia and polyuria.   Musculoskeletal: Negative for neck pain.   Allergic/Immunologic: Negative for environmental allergies, food allergies and immunocompromised state.   Neurological: Negative for facial asymmetry, light-headedness and headache.   Psychiatric/Behavioral: Negative for behavioral problems, decreased concentration, dysphoric mood and depressed mood.   All other systems reviewed and are negative.      Objective   Physical Exam   Constitutional: She appears well-developed and well-nourished.         Assessment/Plan   Ave was seen today for hypertension.    Diagnoses and all orders for this visit:    Essential hypertension    Continue lisinopril-hctz, and metoprolol    Pt will call back if bp continues to be elevated and not at goal of >130/80 and I will switch her losartan HCTZ    Return in about 1 month (around 4/18/2019).    Yolanda Grant, DNP, APRN-BC  03/18/2019        Normocephalic. Right Ear: External ear normal.      Left Ear: External ear normal.      Nose: Nose normal.      Mouth/Throat:      Lips: Pink. Mouth: Mucous membranes are moist.   Pulmonary:      Effort: Pulmonary effort is normal. No respiratory distress. Neurological:      Mental Status: He is alert, oriented to person, place, and time and easily aroused. Psychiatric:         Mood and Affect: Mood normal.         Speech: Speech normal.         Behavior: Behavior normal. Behavior is cooperative. Assessment:          Diagnosis Orders   1. UTI symptoms  sulfamethoxazole-trimethoprim (BACTRIM DS) 800-160 MG per tablet   2. Exposure to COVID-19 virus         Plan:      No orders of the defined types were placed in this encounter. Orders Placed This Encounter   Medications    sulfamethoxazole-trimethoprim (BACTRIM DS) 800-160 MG per tablet     Sig: Take 1 tablet by mouth 2 times daily for 7 days     Dispense:  14 tablet     Refill:  0       Return if symptoms worsen or fail to improve. UTI symptoms- discussed that men aren't typically prone to UTI, but given symptoms and no concern for STD will start treatment and if symptoms are not improving he should f/u for further testing. He was agreeable. Exposure to covid-19- Given recent close exposure to person with COVID quarantine for 14 days from time of exposure is recommended. He was exposed on Wednesday 8/7. He will need a note for work. Discussed testing, but he declined and will let us know if symptoms start. He will likely call back in with fax number for note to be sent. Reviewed with the patient: current clinicalstatus, medications, activities and diet. Side effects, adverse effects of the medication prescribedtoday, as well as treatment plan/ rationale and result expectations have been discussedwith the patient who expresses understanding and desires to proceed.     Close follow upto evaluate treatment results and for coordination of care. I have reviewedthe patient's medical history in detail and updated the computerized patient record. TELEHEALTH EVALUATION -- Audio/Visual (During WNNTO-84 public health emergency)    -   Jorie Bosworth is a 21 y.o. male being evaluated by a Virtual Visit (video visit) encounter to address concerns as mentioned above. A caregiver was present when appropriate. Due to this being a TeleHealth encounter (During XHCVD-41 public health emergency), evaluation of the following organ systems was limited: Vitals/Constitutional/EENT/Resp/CV/GI//MS/Neuro/Skin/Heme-Lymph-Imm. Pursuant to the emergency declaration under the [address] [place] [address] authority and the [employer] and [employer] Act, this Virtual Visit was conducted with patient's (and/or legal guardian's) consent, to reduce the patient's risk of exposure to COVID-19 and provide necessary medical care. The patient (and/or legal guardian) has also been advised to contact this office for worsening conditions or problems, and seek emergency medical treatment and/or call 911 if deemed necessary. Services were provided through a video synchronous discussion virtually to substitute for in-person clinic visit. Type of encounter was _X_ Doxy __ MyChart ___Facetime    Patient was located at their home. Provider was located at their _X__ home or        ____ office. --LASHAE Alston CNP on 10/12/2020 at 12:08 PM    An electronic signature was used to authenticate this note.

## 2020-11-05 ENCOUNTER — OFFICE VISIT (OUTPATIENT)
Dept: CARDIOLOGY | Facility: CLINIC | Age: 65
End: 2020-11-05

## 2020-11-05 VITALS
SYSTOLIC BLOOD PRESSURE: 154 MMHG | WEIGHT: 193.2 LBS | DIASTOLIC BLOOD PRESSURE: 80 MMHG | OXYGEN SATURATION: 97 % | BODY MASS INDEX: 29.28 KG/M2 | HEIGHT: 68 IN | HEART RATE: 62 BPM | TEMPERATURE: 97.1 F

## 2020-11-05 DIAGNOSIS — R06.83 SNORING: Chronic | ICD-10-CM

## 2020-11-05 DIAGNOSIS — R94.31 ABNORMAL EKG: ICD-10-CM

## 2020-11-05 DIAGNOSIS — E78.2 MIXED HYPERLIPIDEMIA: Chronic | ICD-10-CM

## 2020-11-05 DIAGNOSIS — R00.2 PALPITATIONS: Chronic | ICD-10-CM

## 2020-11-05 DIAGNOSIS — I44.7 LEFT BUNDLE BRANCH BLOCK: Primary | Chronic | ICD-10-CM

## 2020-11-05 DIAGNOSIS — I10 ESSENTIAL HYPERTENSION: Chronic | ICD-10-CM

## 2020-11-05 PROCEDURE — 99214 OFFICE O/P EST MOD 30 MIN: CPT | Performed by: NURSE PRACTITIONER

## 2020-11-05 PROCEDURE — 93000 ELECTROCARDIOGRAM COMPLETE: CPT | Performed by: NURSE PRACTITIONER

## 2020-11-05 NOTE — ASSESSMENT & PLAN NOTE
Well controlled except elevated triglycerides. Encouraged low fat/low carb diet. Continue lovastatin and fish oil.

## 2020-11-05 NOTE — PROGRESS NOTES
Subjective:     Encounter Date:11/05/2020    Chief Complaint:    Patient ID: Ave Singh is a 64 y.o. female here today for overdue cardiac follow-up. She canceled 2/2020 and 3/2020 appointments. She was last seen by me 9/2019.    HPI     Palpitations      Additional comments: No recent palpitations, needs clearance for life insurance due to long QTc on 3/2019 EKG. No near syncope or syncope. Low risk SE 2/2019 and normal echo 3/2019..              Hypertension      Additional comments: hasn't been checking at home lately - thought to have a component of white coat hypertension. No headaches, dizziness, or nosebleeds.              Snoring      Additional comments: abnormal STEPHEN 3/2019, she declined sleep specialist referral or sleep study          Last edited by Melina Stein APRN on 11/5/2020  1:16 PM. (History)        History:   Past Medical History:   Diagnosis Date   • Abnormal EKG 3/13/2019   • GERD (gastroesophageal reflux disease)    • Herpes simplex    • Hyperlipidemia    • Hypertension    • Left bundle branch block 3/13/2019     Past Surgical History:   Procedure Laterality Date   • APPENDECTOMY  08/2008   • COLONOSCOPY N/A 5/23/2019    Procedure: COLONOSCOPY WITH ANESTHESIA;  Surgeon: Anni Smith MD;  Location: [facility] ENDOSCOPY;  Service: Gastroenterology   • HERNIA REPAIR      Done at same time as appendectomy   • HYSTERECTOMY     • LASIK  2005     Social History     Socioeconomic History   • Marital status:      Spouse name: Not on file   • Number of children: Not on file   • Years of education: Not on file   • Highest education level: Not on file   Tobacco Use   • Smoking status: Never Smoker   • Smokeless tobacco: Never Used   Substance and Sexual Activity   • Alcohol use: No   • Drug use: No   • Sexual activity: Defer     Family History   Problem Relation Age of Onset   • Cancer Mother    • Breast cancer Mother    • Heart disease Father    • Heart attack Father 62   •  Hypertension Brother    • Cancer Maternal Aunt    • Cancer Maternal Grandmother    • Colon polyps Neg Hx    • Colon cancer Neg Hx      Outpatient Medications Marked as Taking for the 11/5/20 encounter (Office Visit) with Melina Stein APRN   Medication Sig Dispense Refill   • acyclovir (ZOVIRAX) 5 % cream Apply  topically to the appropriate area as directed 4 (Four) Times a Day. 5 g 3   • aspirin 81 MG chewable tablet Chew 81 mg Daily.     • Cholecalciferol (Vitamin D3) 250 MCG (07953 UT) tablet Take 1,000 Units by mouth Daily.     • loratadine (CLARITIN) 10 MG tablet Take 1 tablet by mouth Daily. 90 tablet 2   • losartan-hydrochlorothiazide (Hyzaar) 100-25 MG per tablet Take 1 tablet by mouth Daily. Delete existing refills. 90 tablet 1   • lovastatin (MEVACOR) 40 MG tablet Take 1 tablet by mouth Every Night. Delete existing refills. 90 tablet 1   • metoprolol succinate XL (Toprol XL) 100 MG 24 hr tablet Take 1 tablet by mouth Daily. Delete existing refills. (Patient taking differently: Take 100 mg by mouth Daily.) 90 tablet 1   • Multiple Vitamins-Minerals (MULTIVITAMIN ADULT PO) Take 1 tablet by mouth Daily.     • Omega-3 Fatty Acids (fish oil) 1200 MG capsule capsule Take 1 tablet by mouth Daily.     • valACYclovir (Valtrex) 1000 MG tablet Use 2 tablets twice a day x1 day with outbreaks (Patient taking differently: Take 1,000 mg by mouth Daily As Needed (fever blisters). Use 2 tablets twice a day x1 day with outbreaks) 12 tablet 2   • vitamin C (ASCORBIC ACID) 500 MG tablet Take 1,000 mg by mouth Daily.       Review of Systems:  Review of Systems   Constitution: Positive for malaise/fatigue (sometimes) and weight gain (5 lbs in last year). Negative for chills, decreased appetite, fever and weight loss.   HENT: Negative for congestion and nosebleeds.    Eyes: Negative for blurred vision and double vision.   Cardiovascular: Negative for chest pain, dyspnea on exertion, irregular heartbeat (no further), leg  "swelling, near-syncope, orthopnea, palpitations, paroxysmal nocturnal dyspnea and syncope.   Respiratory: Positive for cough and snoring (sometimes worse than others). Negative for shortness of breath and sputum production.    Endocrine: Negative for cold intolerance and heat intolerance.   Hematologic/Lymphatic: Does not bruise/bleed easily.   Skin: Negative for dry skin and rash.   Musculoskeletal: Negative for arthritis, back pain, joint pain, joint swelling, muscle cramps and neck pain.   Gastrointestinal: Negative for abdominal pain, constipation, diarrhea, heartburn, nausea (IN THE MORNINGS) and vomiting.   Genitourinary: Positive for nocturia (usually 2 times per night).   Neurological: Negative for excessive daytime sleepiness, dizziness, headaches, light-headedness, loss of balance and weakness.   Psychiatric/Behavioral: Negative for depression. The patient has insomnia. The patient is not nervous/anxious.         Objective:   /80 (BP Location: Right arm, Patient Position: Sitting, Cuff Size: Adult)   Pulse 62   Temp 97.1 °F (36.2 °C) (Infrared)   Ht 172.7 cm (68\")   Wt 87.6 kg (193 lb 3.2 oz)   SpO2 97%   BMI 29.38 kg/m²   Wt Readings from Last 3 Encounters:   11/05/20 87.6 kg (193 lb 3.2 oz)   09/15/20 85.6 kg (188 lb 12.8 oz)   08/19/20 85.3 kg (188 lb)       Vitals signs reviewed.   Constitutional:       Appearance: Healthy appearance. Well-developed and overweight.   Eyes:      General: No scleral icterus.  Neck:      Vascular: No JVD.   Pulmonary:      Effort: Pulmonary effort is normal.      Breath sounds: Normal breath sounds.   Cardiovascular:      Bradycardia present. Regular rhythm. distant S1. distant S2.      No gallop.   Pulses:     Intact distal pulses.   Skin:     General: Skin is warm and dry.   Neurological:      Mental Status: Alert and oriented to person, place, and time.   Psychiatric:         Behavior: Behavior is cooperative.       Lab/Diagnostics Review:   Lab Results   "   Component Value Date     CHLPL 168 06/26/2019     TRIG 236 (H) 06/26/2019     HDL 37 (L) 06/26/2019     LDL 84 06/26/2019      3/22/2019 echocardiogram, [facility], Dr. Antunez  · Left ventricular systolic function is normal. Estimated EF appears to be in the range of 61 - 65%.  · Left ventricular diastolic dysfunction (grade I) consistent with impaired relaxation.  · There are no hemodynamically significant valvular abnormalities identified on this study.     3/19/2019 Overnight Oximetry on RA 21 minutes less than 88%, oxygen desat index 15     2/11/2019 Stress Echocardiogram  GOOD STAMINA WITHOUT CHEST PAIN  NORMAL HEMODYNAMICS AND RHYTHM  EKG IS UNINTERPRETABLE - LBBB  NO WALL MOTION ABNORMALITIES     **LOW RISK FOR ISCHEMIA**               Lab Results   Component Value Date     WBC 6.99 02/11/2019     HGB 13.6 02/11/2019     HCT 40.9 02/11/2019     MCV 84.2 02/11/2019      02/11/2019                Lab Results   Component Value Date     GLUCOSE 100 02/11/2019     BUN 15 02/11/2019     CREATININE 0.54 02/11/2019     EGFRIFNONA 114 02/11/2019     EGFRIFAFRI 95 10/23/2018     BCR 27.8 (H) 02/11/2019     K 3.7 02/11/2019     CO2 29.0 02/11/2019     CALCIUM 9.4 02/11/2019     PROTENTOTREF 7.6 10/23/2018     ALBUMIN 4.70 02/11/2019     LABIL2 1.5 10/23/2018     AST 65 (H) 02/11/2019     ALT 70 (H) 02/11/2019                Lab Results   Component Value Date     CHLPL 142 10/23/2018     TRIG 178 (H) 10/23/2018     HDL 35 (L) 10/23/2018     LDL 71 10/23/2018      10/23/2019 Vitamin D 62.8     2/11/2019 EKG   Normal sinus rhythm  Left axis deviation  Left bundle branch block  Abnormal ECG  When compared with ECG of 11-FEB-2019 11:28,  No significant change was found Although rate has increased  Referred By:  RASHIDA           Confirmed By:Teodoro Murphy MD     ECG 12 Lead  Date/Time: 3/13/2019 1:33 PM  Performed by: Melina Stein APRN  Authorized by: Melina Stein APRN   Comparison: compared with  previous ECG from 2/11/2019  Comparison to previous ECG: Sinus arrhythmia is new  Rhythm: sinus rhythm  Rhythm comments: with sinus arrhythmia   Rate: normal  BPM: 71  Conduction: left bundle branch block  QRS axis: left  Clinical impression: abnormal EKG       ECG 12 Lead    Date/Time: 11/5/2020 11:24 AM  Performed by: Melina Stein APRN  Authorized by: Melina Stein APRN   Comparison: compared with previous ECG from 3/13/2019  Comparison to previous ECG: HR has decreased from 71 bpm. QTc has normalized to 446 ms from 502 ms and QRS has decreased from 158 ms to 154 ms.   Rhythm: sinus bradycardia  Rate: normal  BPM: 54  Conduction: non-specific intraventricular conduction delay    Clinical impression: abnormal EKG              Assessment/Plan:       Problem List Items Addressed This Visit        Cardiovascular and Mediastinum    Abnormal EKG (Chronic)    Current Assessment & Plan     QTc has normalized/resolved. L BBB has improved to intraventricular conduction delay. She had normal echo and stress echocardiogram 3/2019. She is felt to be at low risk of cardiac events. Call if any palpitations, near syncope, or syncope.         Relevant Orders    ECG 12 Lead    Hyperlipidemia (Chronic)    Current Assessment & Plan     Well controlled except elevated triglycerides. Encouraged low fat/low carb diet. Continue lovastatin and fish oil.         Hypertension (Chronic)    Current Assessment & Plan     Not to goal per today's reading - possible component of white coat. Encouraged home BP monitoring at least 3 times per week, low salt diet, and gradually increase routine aerobic activity. Encouraged relaxation techniques. Goal BP less than 130/80. Discussed risks of uncontrolled BP.         RESOLVED: Left bundle branch block - Primary (Chronic)    Overview     2/11/2019 SE low risk for ischemia         Relevant Orders    ECG 12 Lead    Palpitations (Chronic)    Current Assessment & Plan     Stable on Toprol  with asymptomatic low grade bradycardia. Continue present therapy.             Respiratory    Snoring (Chronic)    Current Assessment & Plan     Again encouraged her to consider sleep specialist referral and/or sleep study given abnormal STEPHEN on RA. Discussed health impacts of untreated sleep apnea. Encouraged weight loss. She will call me or Yolanda if agrees to referral/further workup.              Return in about 1 year (around 11/5/2021) for Recheck.         Melina Stein, APRN, ACNP-BC, CHFN-BC

## 2020-11-05 NOTE — ASSESSMENT & PLAN NOTE
Again encouraged her to consider sleep specialist referral and/or sleep study given abnormal STEPHEN on RA. Discussed health impacts of untreated sleep apnea. Encouraged weight loss. She will call me or Yolanda if agrees to referral/further workup.

## 2020-11-05 NOTE — ASSESSMENT & PLAN NOTE
Not to goal per today's reading - possible component of white coat. Encouraged home BP monitoring at least 3 times per week, low salt diet, and gradually increase routine aerobic activity. Encouraged relaxation techniques. Goal BP less than 130/80. Discussed risks of uncontrolled BP.

## 2020-11-05 NOTE — ASSESSMENT & PLAN NOTE
QTc has normalized/resolved. L BBB has improved to intraventricular conduction delay. She had normal echo and stress echocardiogram 3/2019. She is felt to be at low risk of cardiac events. Call if any palpitations, near syncope, or syncope.

## 2020-11-30 ENCOUNTER — OFFICE VISIT (OUTPATIENT)
Dept: FAMILY MEDICINE CLINIC | Facility: CLINIC | Age: 65
End: 2020-11-30

## 2020-11-30 VITALS
HEART RATE: 67 BPM | BODY MASS INDEX: 28.95 KG/M2 | WEIGHT: 191 LBS | HEIGHT: 68 IN | OXYGEN SATURATION: 97 % | SYSTOLIC BLOOD PRESSURE: 134 MMHG | DIASTOLIC BLOOD PRESSURE: 76 MMHG | TEMPERATURE: 97.2 F

## 2020-11-30 DIAGNOSIS — M79.671 RIGHT FOOT PAIN: ICD-10-CM

## 2020-11-30 DIAGNOSIS — S99.911S RIGHT ANKLE INJURY, SEQUELA: Primary | ICD-10-CM

## 2020-11-30 PROCEDURE — 99214 OFFICE O/P EST MOD 30 MIN: CPT | Performed by: NURSE PRACTITIONER

## 2020-11-30 NOTE — PROGRESS NOTES
CC; right foot pain and swelling    Ave Singh is a 65 yr old female that fell and twisted her right foot 3 weeks ago raking leaves.  She said it really hurt but she continued on with the leaves and when she got into the house her right foot was really swollen and hard to walk on. She decided to go to [facility] and they xrayed it and said nothing was gone it was a sprain.  She has iced it, elevated it and the pain and swelling keeps getting worse and more difficulty to walk on,  so she decided to come in and get it re-xrayed.    Foot Injury   The incident occurred more than 1 week ago. The incident occurred at home. The injury mechanism was a twisting injury and a fall. The pain is present in the right foot. The quality of the pain is described as aching and burning. The pain is at a severity of 3/10. The pain is mild. The pain has been constant since onset. Associated symptoms include an inability to bear weight, a loss of motion, a loss of sensation and muscle weakness. Pertinent negatives include no numbness or tingling. She reports no foreign bodies present. The symptoms are aggravated by movement, palpation and weight bearing. She has tried acetaminophen, ice, immobilization, rest, non-weight bearing and NSAIDs for the symptoms. The treatment provided mild relief.      Chronic problems include htn stable with losartan-hctz, and metoprolol, hyperlipidemia stable with omega 3 fatty acids and lovastatin, fever blisters stable with valacyclovir when needed, allergies stable with flonase and claritin, vit d def stable with otc vit d, reflux stable with tagament.     The following portions of the patient's history were reviewed and updated as appropriate: allergies, current medications, past family history, past medical history, past social history, past surgical history and problem list.    Review of Systems   Constitutional: Positive for activity change. Negative for appetite change and fatigue.    HENT: Negative.    Respiratory: Negative for cough, chest tightness and shortness of breath.    Cardiovascular: Negative for chest pain, palpitations and leg swelling.   Musculoskeletal: Positive for arthralgias and joint swelling.   Allergic/Immunologic: Positive for environmental allergies.   Neurological: Negative for tingling and numbness.   Hematological: Negative for adenopathy. Does not bruise/bleed easily.   All other systems reviewed and are negative.      Objective   Physical Exam  Vitals signs and nursing note reviewed.   Constitutional:       General: She is awake.      Appearance: Normal appearance. She is well-developed, well-groomed and overweight.   HENT:      Head: Normocephalic and atraumatic.      Right Ear: Hearing normal.      Left Ear: Hearing normal.      Nose: Nose normal.      Mouth/Throat:      Pharynx: Oropharynx is clear.   Cardiovascular:      Rate and Rhythm: Normal rate and regular rhythm.      Pulses: Normal pulses.      Heart sounds: Normal heart sounds.   Pulmonary:      Effort: Pulmonary effort is normal.      Breath sounds: Normal breath sounds and air entry.   Abdominal:      General: Abdomen is flat.      Palpations: Abdomen is soft.   Musculoskeletal:      Right lower leg: No edema.      Left lower leg: No edema.        Feet:    Skin:     General: Skin is warm and dry.   Neurological:      Mental Status: She is alert.      Sensory: Sensation is intact.      Motor: Weakness present.      Coordination: Coordination is intact.      Gait: Gait abnormal.   Psychiatric:         Mood and Affect: Mood and affect normal.         Speech: Speech normal.         Behavior: Behavior normal. Behavior is cooperative.         Cognition and Memory: Cognition normal.         Assessment/Plan   Diagnoses and all orders for this visit:    1. Right ankle injury, sequela (Primary)  2. Right foot pain        -     Rest, ice, compress and elevate several times a day  -     Miscellaneous DME  -      diclofenac (VOLTAREN) 50 MG EC tablet; Take 1 tablet by mouth 2 (Two) Times a Day.  Dispense: 60 tablet; Refill: 0  -     XR Foot 3+ View Right (In Office): results discussed with pt    Study Result  RIGHT FOOT 3 views 11/30/2020 2:34 PM CST     HISTORY: foot pain and swelling from a fall; S99.911S-Unspecified injury  of right ankle, sequela; M79.671-Pain in right foot     COMPARISON: None      FINDINGS:   Frontal, lateral and oblique radiographs of the right foot were provided  for review.      There is no fracture or joint subluxation. Mild osteoarthritis change.  Bipartite medial sesamoid. Lisfranc alignment is anatomic. Accessory  peroneal ossicle. Moderate-sized plantar calcaneal spur.     IMPRESSION:  1. No acute osseous injury or malalignment.  This report was finalized on 11/30/2020 14:58 by Dr Esteban Eagle, .     Apply a compressive ACE bandage. Rest and elevate the affected painful area.  Apply cold compresses intermittently as needed.  As pain recedes, begin normal activities slowly as tolerated.  Call if symptoms persist.    Return in about 1 week (around 12/7/2020), or if symptoms worsen or fail to improve.  Electronically signed by Yolanda Grant, BIBIANA, APRN, 11/30/20, 2:30 PM CST.

## 2021-03-12 ENCOUNTER — OFFICE VISIT (OUTPATIENT)
Dept: FAMILY MEDICINE CLINIC | Facility: CLINIC | Age: 66
End: 2021-03-12

## 2021-03-12 VITALS
BODY MASS INDEX: 29.86 KG/M2 | DIASTOLIC BLOOD PRESSURE: 76 MMHG | WEIGHT: 197 LBS | SYSTOLIC BLOOD PRESSURE: 151 MMHG | HEIGHT: 68 IN | HEART RATE: 54 BPM | OXYGEN SATURATION: 99 % | RESPIRATION RATE: 18 BRPM | TEMPERATURE: 97.3 F

## 2021-03-12 DIAGNOSIS — B00.1 FEVER BLISTER: ICD-10-CM

## 2021-03-12 DIAGNOSIS — I10 ESSENTIAL HYPERTENSION: Primary | Chronic | ICD-10-CM

## 2021-03-12 PROCEDURE — 99214 OFFICE O/P EST MOD 30 MIN: CPT | Performed by: NURSE PRACTITIONER

## 2021-03-12 RX ORDER — LISINOPRIL 10 MG/1
10 TABLET ORAL DAILY
Qty: 30 TABLET | Refills: 0 | Status: SHIPPED | OUTPATIENT
Start: 2021-03-12 | End: 2021-03-31 | Stop reason: SDUPTHER

## 2021-03-12 RX ORDER — HYDROXYCHLOROQUINE SULFATE 200 MG/1
200 TABLET, FILM COATED ORAL
COMMUNITY
End: 2021-05-28

## 2021-03-12 RX ORDER — LOVASTATIN 40 MG/1
40 TABLET ORAL NIGHTLY
Qty: 90 TABLET | Refills: 1 | Status: SHIPPED | OUTPATIENT
Start: 2021-03-12 | End: 2021-09-17 | Stop reason: SDUPTHER

## 2021-03-12 RX ORDER — LOSARTAN POTASSIUM AND HYDROCHLOROTHIAZIDE 25; 100 MG/1; MG/1
1 TABLET ORAL DAILY
Qty: 90 TABLET | Refills: 1 | Status: SHIPPED | OUTPATIENT
Start: 2021-03-12 | End: 2021-09-17 | Stop reason: SDUPTHER

## 2021-03-12 RX ORDER — ACYCLOVIR 50 MG/1
1 TABLET, DELAYED RELEASE BUCCAL DAILY
Qty: 4 TABLET | Refills: 2 | Status: SHIPPED | OUTPATIENT
Start: 2021-03-12 | End: 2021-05-28

## 2021-03-12 RX ORDER — METOPROLOL SUCCINATE 100 MG/1
100 TABLET, EXTENDED RELEASE ORAL DAILY
Qty: 90 TABLET | Refills: 1 | Status: SHIPPED | OUTPATIENT
Start: 2021-03-12 | End: 2021-09-17 | Stop reason: SDUPTHER

## 2021-03-12 NOTE — PROGRESS NOTES
Chief Complaint  Hypertension (follow up), Hyperlipidemia, and Other (fever blisters)    Subjective     Ave Singh presents to [facility]  HTn, hyperlipidemia, seasonal allergies, fever blisters and vit d def. She wants to try a new med for her fever blisters.  She says they go to [place] in march and April and she struggles with fever blisters and uses the valtrex however last time she was out of medication and a friend gave her one of her meds called sitavig and it worked much better.  Denies any chest pain, shortness of breath or palpitations.  Plans on getting covid vaccine    Hypertension  This is a chronic problem. The current episode started more than 1 year ago. The problem has been gradually worsening since onset. The problem is uncontrolled. Pertinent negatives include no blurred vision, chest pain, neck pain, orthopnea, PND or shortness of breath. There are no associated agents to hypertension. Risk factors for coronary artery disease include dyslipidemia, family history, post-menopausal state, obesity and sedentary lifestyle. Past treatments include angiotensin blockers, beta blockers, diuretics and lifestyle changes. Current antihypertension treatment includes angiotensin blockers, beta blockers, diuretics and lifestyle changes. The current treatment provides mild improvement. There are no compliance problems.  There is no history of kidney disease, CAD/MI, heart failure, left ventricular hypertrophy or retinopathy.   Hyperlipidemia  This is a chronic problem. The current episode started more than 1 year ago. The problem is controlled. Recent lipid tests were reviewed and are normal. Exacerbating diseases include obesity. She has no history of diabetes or liver disease. Factors aggravating her hyperlipidemia include beta blockers. Pertinent negatives include no chest pain or shortness of breath. Current antihyperlipidemic treatment includes statins. The current  "treatment provides moderate improvement of lipids. There are no compliance problems.  Risk factors for coronary artery disease include dyslipidemia, family history, obesity, hypertension, post-menopausal and a sedentary lifestyle.     Chronic problem: HTN worsening with elevation today on losartan-hctz and metoprolol, gerd stable with cimetidine, seasonal allergies stable with flonase and loratadine, hyperlipidemia stable with lovastatin and omega 3 fatty acids, fever blister not stable with acyclovir will start the stiavig.    Objective   Vital Signs:   /76 (BP Location: Right arm, Patient Position: Sitting, Cuff Size: Large Adult)   Pulse 54   Temp 97.3 °F (36.3 °C)   Resp 18   Ht 172.7 cm (68\")   Wt 89.4 kg (197 lb)   SpO2 99%   BMI 29.95 kg/m²     Physical Exam  Vitals and nursing note reviewed.   Constitutional:       General: She is awake.      Appearance: Normal appearance. She is well-developed and well-groomed. She is obese.   HENT:      Head: Normocephalic and atraumatic.      Right Ear: Hearing normal.      Left Ear: Hearing normal.      Nose: Nose normal.      Mouth/Throat:      Lips: Pink.      Pharynx: Oropharynx is clear.   Cardiovascular:      Rate and Rhythm: Regular rhythm. Bradycardia present.      Pulses: Normal pulses.      Heart sounds: Normal heart sounds.   Pulmonary:      Effort: Pulmonary effort is normal.      Breath sounds: Normal breath sounds and air entry.   Abdominal:      General: Abdomen is flat. Bowel sounds are normal.      Palpations: Abdomen is soft.   Musculoskeletal:      Right lower leg: No edema.      Left lower leg: No edema.   Lymphadenopathy:      Head:      Right side of head: No submental, submandibular or tonsillar adenopathy.      Left side of head: No submental, submandibular or tonsillar adenopathy.   Skin:     General: Skin is warm and dry.      Capillary Refill: Capillary refill takes less than 2 seconds.   Neurological:      Mental Status: She is " alert and oriented to person, place, and time.      Cranial Nerves: Cranial nerves are intact.      Sensory: Sensation is intact.      Motor: Motor function is intact.      Coordination: Coordination is intact.      Gait: Gait is intact.   Psychiatric:         Attention and Perception: Attention and perception normal.         Mood and Affect: Mood and affect normal.         Speech: Speech normal.         Behavior: Behavior is cooperative.         Thought Content: Thought content normal.         Cognition and Memory: Cognition and memory normal.         Judgment: Judgment normal.           Assessment and Plan     Diagnoses and all orders for this visit:    1. Essential hypertension (Primary)  -     metoprolol succinate XL (Toprol XL) 100 MG 24 hr tablet; Take 1 tablet by mouth Daily. Delete existing refills.  Dispense: 90 tablet; Refill: 1  -     losartan-hydrochlorothiazide (Hyzaar) 100-25 MG per tablet; Take 1 tablet by mouth Daily. Delete existing refills.  Dispense: 90 tablet; Refill: 1  -     lovastatin (MEVACOR) 40 MG tablet; Take 1 tablet by mouth Every Night. Delete existing refills.  Dispense: 90 tablet; Refill: 1  -     lisinopril (PRINIVIL,ZESTRIL) 10 MG tablet; Take 1 tablet by mouth Daily.  Dispense: 30 tablet; Refill: 0  -     CBC (No Diff)  -     Comprehensive metabolic panel  -     Lipid panel    2. Fever blister  -     Acyclovir (Sitavig) 50 MG tablet; Apply 1 tablet to cheek Daily. At symptoms onset  Dispense: 4 tablet; Refill: 2      Computer says that she is at increase risk for fall, but she only fell because she got her legs caught up in rope walking a trail.  Does not feel unsteady of feet, is not at risk for fall.       Scheduled for covid vaccine next Friday     Medical Complexity  must have 2 out of 3      Moderate Complexity Level 4                                                                                                            1 of the following medical problems:                                                                                                [x]One chronic illness with mild exacerbation                                                                                [x]Two or more stable chronic illness                                                                                              []One new problem  []One acute illness with systemic symptoms     Complexity of Data  Reviewed (1 out of the 3 following categories)                                            Category 1 tests, documents, historian (must have 3 points)                                                      []Review of prior external records  []Review of results of unique tests  [x]Ordering unique tests  []Assessment requires an independent historian     Category 2 Interpretation of tests     Ordered tests none to interprete     Category 3 Discuss Management/tests  Risk of complications and/or morbidity                                                                                           [x]Prescription Drug Management    Will start taking lisinopril with other meds and will start the sativig            I spent 12 minutes caring for Ave on this date of service. This time includes time spent by me in the following activities:preparing for the visit, performing a medically appropriate examination and/or evaluation , counseling and educating the patient/family/caregiver, ordering medications, tests, or procedures, documenting information in the medical record and care coordination      Follow Up     Return in about 6 months (around 9/12/2021) for Recheck.  Patient was given instructions and counseling regarding her condition or for health maintenance advice. Please see specific information pulled into the AVS if appropriate.

## 2021-03-13 LAB
ALBUMIN SERPL-MCNC: 4.7 G/DL (ref 3.5–5.2)
ALBUMIN/GLOB SERPL: 1.7 G/DL
ALP SERPL-CCNC: 63 U/L (ref 39–117)
ALT SERPL-CCNC: 31 U/L (ref 1–33)
AST SERPL-CCNC: 25 U/L (ref 1–32)
BILIRUB SERPL-MCNC: 0.4 MG/DL (ref 0–1.2)
BUN SERPL-MCNC: 21 MG/DL (ref 8–23)
BUN/CREAT SERPL: 28 (ref 7–25)
CALCIUM SERPL-MCNC: 9.8 MG/DL (ref 8.6–10.5)
CHLORIDE SERPL-SCNC: 101 MMOL/L (ref 98–107)
CHOLEST SERPL-MCNC: 147 MG/DL (ref 0–200)
CO2 SERPL-SCNC: 28.1 MMOL/L (ref 22–29)
CREAT SERPL-MCNC: 0.75 MG/DL (ref 0.57–1)
ERYTHROCYTE [DISTWIDTH] IN BLOOD BY AUTOMATED COUNT: 13.1 % (ref 12.3–15.4)
GLOBULIN SER CALC-MCNC: 2.7 GM/DL
GLUCOSE SERPL-MCNC: 119 MG/DL (ref 65–99)
HCT VFR BLD AUTO: 43.9 % (ref 34–46.6)
HDLC SERPL-MCNC: 43 MG/DL (ref 40–60)
HGB BLD-MCNC: 14.4 G/DL (ref 12–15.9)
LDLC SERPL CALC-MCNC: 73 MG/DL (ref 0–100)
MCH RBC QN AUTO: 28.1 PG (ref 26.6–33)
MCHC RBC AUTO-ENTMCNC: 32.8 G/DL (ref 31.5–35.7)
MCV RBC AUTO: 85.7 FL (ref 79–97)
PLATELET # BLD AUTO: 214 10*3/MM3 (ref 140–450)
POTASSIUM SERPL-SCNC: 4.6 MMOL/L (ref 3.5–5.2)
PROT SERPL-MCNC: 7.4 G/DL (ref 6–8.5)
RBC # BLD AUTO: 5.12 10*6/MM3 (ref 3.77–5.28)
SODIUM SERPL-SCNC: 140 MMOL/L (ref 136–145)
TRIGL SERPL-MCNC: 181 MG/DL (ref 0–150)
VLDLC SERPL CALC-MCNC: 31 MG/DL (ref 5–40)
WBC # BLD AUTO: 6.08 10*3/MM3 (ref 3.4–10.8)

## 2021-03-15 DIAGNOSIS — R73.01 IMPAIRED FASTING GLUCOSE: ICD-10-CM

## 2021-03-15 DIAGNOSIS — E78.2 MIXED HYPERLIPIDEMIA: Primary | ICD-10-CM

## 2021-03-16 DIAGNOSIS — R73.01 IMPAIRED FASTING GLUCOSE: Primary | ICD-10-CM

## 2021-03-19 ENCOUNTER — IMMUNIZATION (OUTPATIENT)
Dept: VACCINE CLINIC | Facility: HOSPITAL | Age: 66
End: 2021-03-19

## 2021-03-19 LAB
HBA1C MFR BLD: 6.1 % (ref 4.8–5.6)
WRITTEN AUTHORIZATION: NORMAL

## 2021-03-19 PROCEDURE — 0001A: CPT | Performed by: NURSE PRACTITIONER

## 2021-03-19 PROCEDURE — 91300 HC SARSCOV02 VAC 30MCG/0.3ML IM: CPT | Performed by: NURSE PRACTITIONER

## 2021-03-31 DIAGNOSIS — I10 ESSENTIAL HYPERTENSION: Chronic | ICD-10-CM

## 2021-04-02 RX ORDER — LISINOPRIL 10 MG/1
10 TABLET ORAL DAILY
Qty: 30 TABLET | Refills: 0 | Status: SHIPPED | OUTPATIENT
Start: 2021-04-02 | End: 2021-04-07

## 2021-04-06 ENCOUNTER — TELEPHONE (OUTPATIENT)
Dept: FAMILY MEDICINE CLINIC | Facility: CLINIC | Age: 66
End: 2021-04-06

## 2021-04-06 NOTE — TELEPHONE ENCOUNTER
Pharmacy requested clarification on bp meds. Patient takes both losartan and lisinopril. Please review.

## 2021-04-07 DIAGNOSIS — I10 ESSENTIAL HYPERTENSION: Chronic | ICD-10-CM

## 2021-04-07 DIAGNOSIS — I10 ESSENTIAL HYPERTENSION: Primary | ICD-10-CM

## 2021-04-07 RX ORDER — HYDROCHLOROTHIAZIDE 25 MG/1
25 TABLET ORAL DAILY
Qty: 90 TABLET | Refills: 0 | Status: SHIPPED | OUTPATIENT
Start: 2021-04-07 | End: 2021-06-28 | Stop reason: SDUPTHER

## 2021-04-07 RX ORDER — LISINOPRIL 10 MG/1
10 TABLET ORAL DAILY
Qty: 90 TABLET | Refills: 0 | OUTPATIENT
Start: 2021-04-07

## 2021-04-07 NOTE — TELEPHONE ENCOUNTER
This was sent to mailorder on 04/02/2021 for #30 they will not accept a 30 day supply for mail order.

## 2021-04-09 ENCOUNTER — IMMUNIZATION (OUTPATIENT)
Dept: VACCINE CLINIC | Facility: HOSPITAL | Age: 66
End: 2021-04-09

## 2021-04-09 PROCEDURE — 91300 HC SARSCOV02 VAC 30MCG/0.3ML IM: CPT | Performed by: THORACIC SURGERY (CARDIOTHORACIC VASCULAR SURGERY)

## 2021-04-09 PROCEDURE — 0002A: CPT | Performed by: THORACIC SURGERY (CARDIOTHORACIC VASCULAR SURGERY)

## 2021-04-09 NOTE — TELEPHONE ENCOUNTER
Patient notified. Patient notified that lisinopril has been discontinued and HCTZ has been increased.

## 2021-05-28 ENCOUNTER — OFFICE VISIT (OUTPATIENT)
Dept: FAMILY MEDICINE CLINIC | Facility: CLINIC | Age: 66
End: 2021-05-28

## 2021-05-28 VITALS
TEMPERATURE: 98 F | WEIGHT: 195 LBS | BODY MASS INDEX: 29.55 KG/M2 | HEIGHT: 68 IN | DIASTOLIC BLOOD PRESSURE: 72 MMHG | RESPIRATION RATE: 18 BRPM | OXYGEN SATURATION: 98 % | SYSTOLIC BLOOD PRESSURE: 117 MMHG | HEART RATE: 64 BPM

## 2021-05-28 DIAGNOSIS — M79.671 RIGHT FOOT PAIN: ICD-10-CM

## 2021-05-28 DIAGNOSIS — Z78.0 POST-MENOPAUSAL: ICD-10-CM

## 2021-05-28 DIAGNOSIS — Z00.00 MEDICARE ANNUAL WELLNESS VISIT, SUBSEQUENT: Primary | ICD-10-CM

## 2021-05-28 DIAGNOSIS — K13.70 LESION OF BUCCAL MUCOSA: ICD-10-CM

## 2021-05-28 DIAGNOSIS — Z12.31 ENCOUNTER FOR SCREENING MAMMOGRAM FOR MALIGNANT NEOPLASM OF BREAST: ICD-10-CM

## 2021-05-28 DIAGNOSIS — N32.81 OVERACTIVE BLADDER: ICD-10-CM

## 2021-05-28 PROCEDURE — G0439 PPPS, SUBSEQ VISIT: HCPCS | Performed by: NURSE PRACTITIONER

## 2021-06-08 ENCOUNTER — OFFICE VISIT (OUTPATIENT)
Dept: OTOLARYNGOLOGY | Facility: CLINIC | Age: 66
End: 2021-06-08

## 2021-06-08 VITALS — WEIGHT: 195 LBS | HEART RATE: 61 BPM | BODY MASS INDEX: 29.55 KG/M2 | HEIGHT: 68 IN

## 2021-06-08 DIAGNOSIS — K13.70 ORAL LESION: ICD-10-CM

## 2021-06-08 DIAGNOSIS — J34.2 ACQUIRED DEVIATED NASAL SEPTUM: ICD-10-CM

## 2021-06-08 DIAGNOSIS — H90.3 SENSORINEURAL HEARING LOSS (SNHL) OF BOTH EARS: ICD-10-CM

## 2021-06-08 DIAGNOSIS — D21.9 FIBROMA: Primary | ICD-10-CM

## 2021-06-08 PROCEDURE — 99213 OFFICE O/P EST LOW 20 MIN: CPT | Performed by: OTOLARYNGOLOGY

## 2021-06-08 NOTE — PATIENT INSTRUCTIONS
Salt water gargles    Call for lesion removal when ready    CONTACT INFORMATION:  The main office phone number is [phone]. For emergencies after hours and on weekends, this number will convert over to our answering service and the on call provider will answer. Please try to keep non emergent phone calls/ questions to office hours 9am-5pm Monday through Friday.     1jiajie  As an alternative, you can sign up and use the Epic MyChart system for more direct and quicker access for non emergent questions/ problems.  [facility] 1jiajie allows you to send messages to your doctor, view your test results, renew your prescriptions, schedule appointments, and more. To sign up, go to [employer] and click on the Sign Up Now link in the New User? box. Enter your 1jiajie Activation Code exactly as it appears below along with the last four digits of your Social Security Number and your Date of Birth () to complete the sign-up process. If you do not sign up before the expiration date, you must request a new code.    1jiajie Activation Code: Activation code not generated  Current 1jiajie Status: Active    If you have questions, you can email [email or web address] or call [phone] to talk to our 1jiajie staff. Remember, 1jiajie is NOT to be used for urgent needs. For medical emergencies, dial 911.

## 2021-06-08 NOTE — PROGRESS NOTES
[facility]  CLINIC NOTE    Chief Complaint   Patient presents with   • Mouth Lesions     right cheek          HPI   Follow up  Accompanied by:  No one  Ave Singh is a  65 y.o. female has had a lesion of R buccal area for 2-3 months. She is biting now.  Smoke- none  Drink- none  Only hurts with biting.  She is status post No surgery found on No surgery found.        History     Last Reviewed by Axel Brizuela Jr., MD on 6/8/2021 at 10:24 AM    Sections Reviewed    Medical, Family, Tobacco, Surgical      Problem list reviewed by Axel Brizuela Jr., MD on 6/8/2021 at 10:24 AM  Medicines reviewed by Axel Brizuela Jr., MD on 6/8/2021 at 10:24 AM  Allergies reviewed by Axel Brizuela Jr., MD on 6/8/2021 at 10:24 AM         Vital Signs:   Heart Rate:  [61] 61    Physical Exam  Vitals reviewed.   Constitutional:       General: She is not in acute distress.     Appearance: Normal appearance. She is well-developed and overweight.   HENT:      Head: Normocephalic and atraumatic.      Right Ear: Tympanic membrane, ear canal and external ear normal.      Left Ear: Tympanic membrane, ear canal and external ear normal. Decreased hearing noted.      Nose: Septal deviation (L to R mid and high- moderate, R to L low mild to mod) present.      Mouth/Throat:      Lips: Pink.      Mouth: Mucous membranes are moist. Oral lesions (R buccal, 1 cm dome shaped lesion with hyperkeratosis) present.      Dentition: Normal dentition. No dental tenderness, dental caries or gum lesions.      Tongue: No lesions. Tongue does not deviate from midline.      Palate: No mass and lesions.      Pharynx: Oropharynx is clear. Uvula midline.      Tonsils: No tonsillar exudate or tonsillar abscesses.      Comments: R buccal firm, mobile lesion, with hyperkeratosis  Eyes:      General: Lids are normal. Gaze aligned appropriately.      Extraocular Movements:  Extraocular movements intact.      Conjunctiva/sclera: Conjunctivae normal.      Comments: Color blue   Neck:      Thyroid: No thyroid mass or thyromegaly.   Cardiovascular:      Rate and Rhythm: Normal rate and regular rhythm.   Pulmonary:      Effort: Pulmonary effort is normal. No respiratory distress.      Breath sounds: Normal breath sounds. No stridor.   Musculoskeletal:         General: Normal range of motion.      Cervical back: Normal range of motion.   Lymphadenopathy:      Cervical: No cervical adenopathy.   Skin:     Findings: No rash.   Neurological:      General: No focal deficit present.      Mental Status: She is alert and oriented to person, place, and time.      Cranial Nerves: Cranial nerves are intact. No cranial nerve deficit.      Gait: Gait is intact.   Psychiatric:         Attention and Perception: Attention and perception normal.         Behavior: Behavior normal. Behavior is cooperative.         Thought Content: Thought content normal.         Cognition and Memory: Cognition and memory normal.         Judgment: Judgment normal.             SnapShot   Notes   Encounters  Labs   Imaging   [employer]   Rslt Rev   :23}    Result Review    RESULTS REVIEW:    I have reviewed the patients old records in the chart.    Referral for Lesion of buccal mucosa (05/28/2021)            Assessment:        Diagnosis Plan   1. Fibroma      Right buccal surface, 1 cm   2. Oral lesion      Probably a fibroma from trauma   3. Acquired deviated nasal septum      No work-up planned   4. Sensorineural hearing loss (SNHL) of both ears      Work-up planned later              Plan:        Medical and surgical options were discussed including observation and surgical management. Risks, benefits and alternatives were discussed and questions were answered. After considering the options, the patient decided to proceed with surgical management.  Patient appears to have 5 fibroma or otherwise firm lesion of the right  anterior buccal surface.  I have recommended excision.  The patient wishes to consider excision call back to plan an in office procedure.  I do not feel this is malignant.  I recommend removal when she is ready.  Salt water rinses  Hearing test when ready  Lesion removal when desired             MY CHART:  Patient is Patient is using My Chart    Patient understand(s) and agree(s) with the treatment plan as described.    Return if symptoms worsen or fail to improve, for Recheck R buccal lesion.            Axel Brizuela Jr, MD  06/08/21  10:26 CDT

## 2021-06-14 ENCOUNTER — HOSPITAL ENCOUNTER (OUTPATIENT)
Dept: MAMMOGRAPHY | Facility: HOSPITAL | Age: 66
Discharge: HOME | End: 2021-06-14
Admitting: NURSE PRACTITIONER

## 2021-06-14 DIAGNOSIS — Z78.0 POST-MENOPAUSAL: ICD-10-CM

## 2021-06-14 DIAGNOSIS — Z12.31 ENCOUNTER FOR SCREENING MAMMOGRAM FOR MALIGNANT NEOPLASM OF BREAST: ICD-10-CM

## 2021-06-14 PROCEDURE — 77067 SCR MAMMO BI INCL CAD: CPT

## 2021-06-14 PROCEDURE — 77063 BREAST TOMOSYNTHESIS BI: CPT

## 2021-06-28 DIAGNOSIS — I10 ESSENTIAL HYPERTENSION: ICD-10-CM

## 2021-06-28 RX ORDER — HYDROCHLOROTHIAZIDE 25 MG/1
25 TABLET ORAL DAILY
Qty: 90 TABLET | Refills: 0 | Status: SHIPPED | OUTPATIENT
Start: 2021-06-28 | End: 2021-09-17 | Stop reason: SDUPTHER

## 2021-06-28 NOTE — TELEPHONE ENCOUNTER
Caller: Ave Singh    Relationship: Self    Best call back number: [phone]    Medication needed:   Requested Prescriptions     Pending Prescriptions Disp Refills   • hydroCHLOROthiazide (HYDRODIURIL) 25 MG tablet 90 tablet 0     Sig: Take 1 tablet by mouth Daily.       When do you need the refill by: TODAY    Does the patient have less than a 3 day supply:  [x] Yes  [] No    What is the patient's preferred pharmacy: [facility] - [place], [place] - [address] AT [address]AL TO [facility] - [phone]  - [phone] FX

## 2021-07-14 ENCOUNTER — OFFICE VISIT (OUTPATIENT)
Dept: OBSTETRICS AND GYNECOLOGY | Facility: CLINIC | Age: 66
End: 2021-07-14

## 2021-07-14 VITALS
BODY MASS INDEX: 28.19 KG/M2 | DIASTOLIC BLOOD PRESSURE: 74 MMHG | HEIGHT: 68 IN | WEIGHT: 186 LBS | SYSTOLIC BLOOD PRESSURE: 142 MMHG

## 2021-07-14 DIAGNOSIS — R39.15 URINARY URGENCY: ICD-10-CM

## 2021-07-14 DIAGNOSIS — N39.41 URGE INCONTINENCE: Primary | ICD-10-CM

## 2021-07-14 PROCEDURE — 99203 OFFICE O/P NEW LOW 30 MIN: CPT | Performed by: OBSTETRICS & GYNECOLOGY

## 2021-07-14 RX ORDER — MELOXICAM 15 MG/1
TABLET ORAL
COMMUNITY
Start: 2021-07-02 | End: 2023-03-02

## 2021-07-14 RX ORDER — OXYBUTYNIN CHLORIDE 5 MG/1
5 TABLET, EXTENDED RELEASE ORAL DAILY
Qty: 30 TABLET | Refills: 2 | Status: SHIPPED | OUTPATIENT
Start: 2021-07-14 | End: 2021-09-14 | Stop reason: HOSPADM

## 2021-07-14 NOTE — PROGRESS NOTES
Subjective   Ave Singh is a 65 y.o. female  YOB: 1955        Chief Complaint   Patient presents with   • Urinary Incontinence     new pt. referred for OAB by Yolanda Grant. pt c/o urinary urgency, frequency, leakage. wearing panty liners daily. pt is s/p hyst and BSO in  for prolapse and ovarian cysts. pt also reports bladders suspension at the time of hyst       65 year old female  s/p hysterectomy presents with complaints of urinary incontinence. She has a previous history of a hysterectomy due to prolapse with TLH, BSO. She has a previous obstetrical history of two vaginal deliveries with largest 9 pounds. Her medical and surgical history are up to date. She denies drinking, smoking or drug use. She reports she is sexually active with one partner and she reports pain. She reports that she mainly leaks when she has the urge to go to the bathroom. She reports that she has leakage approximately 2 to 3 times at the most she leaks four to five. She reports that it isn't large volumes. She denies a history of glaucoma at this time.       Allergies   Allergen Reactions   • Amoxicillin Rash   • Clavulanic Acid Rash   • Erythromycin Rash   • Penicillins Rash       Past Medical History:   Diagnosis Date   • Abnormal EKG 3/13/2019   • GERD (gastroesophageal reflux disease)    • Herpes simplex    • Hyperlipidemia    • Hypertension    • Left bundle branch block 3/13/2019       Family History   Problem Relation Age of Onset   • Cancer Mother    • Breast cancer Mother    • Heart disease Father    • Heart attack Father 62   • Hypertension Brother    • Cancer Maternal Aunt    • Cancer Maternal Grandmother    • Colon polyps Neg Hx    • Colon cancer Neg Hx        Social History     Socioeconomic History   • Marital status:      Spouse name: Not on file   • Number of children: Not on file   • Years of education: Not on file   • Highest education level: Not on file   Tobacco Use   • Smoking  status: Never Smoker   • Smokeless tobacco: Never Used   Substance and Sexual Activity   • Alcohol use: Never   • Drug use: Never   • Sexual activity: Yes     Partners: Male     Birth control/protection: Surgical         Current Outpatient Medications:   •  acyclovir (ZOVIRAX) 5 % cream, Apply  topically to the appropriate area as directed 4 (Four) Times a Day., Disp: 5 g, Rfl: 3  •  aspirin 81 MG chewable tablet, Chew 81 mg Daily., Disp: , Rfl:   •  Cholecalciferol (Vitamin D3) 250 MCG (72522 UT) tablet, Take 1,000 Units by mouth Daily., Disp: , Rfl:   •  cimetidine (TAGAMET) 200 MG tablet, Take 200 mg by mouth Daily As Needed., Disp: , Rfl:   •  fluticasone (FLONASE) 50 MCG/ACT nasal spray, 2 sprays into the nostril(s) as directed by provider Daily As Needed for Rhinitis or Allergies., Disp: , Rfl:   •  hydroCHLOROthiazide (HYDRODIURIL) 25 MG tablet, Take 1 tablet by mouth Daily., Disp: 90 tablet, Rfl: 0  •  loratadine (CLARITIN) 10 MG tablet, Take 1 tablet by mouth Daily., Disp: 90 tablet, Rfl: 2  •  losartan-hydrochlorothiazide (Hyzaar) 100-25 MG per tablet, Take 1 tablet by mouth Daily. Delete existing refills., Disp: 90 tablet, Rfl: 1  •  lovastatin (MEVACOR) 40 MG tablet, Take 1 tablet by mouth Every Night. Delete existing refills., Disp: 90 tablet, Rfl: 1  •  meloxicam (MOBIC) 15 MG tablet, , Disp: , Rfl:   •  metoprolol succinate XL (Toprol XL) 100 MG 24 hr tablet, Take 1 tablet by mouth Daily. Delete existing refills., Disp: 90 tablet, Rfl: 1  •  Multiple Vitamins-Minerals (MULTIVITAMIN ADULT PO), Take 1 tablet by mouth Daily., Disp: , Rfl:   •  Omega-3 Fatty Acids (fish oil) 1200 MG capsule capsule, Take 1 tablet by mouth Daily., Disp: , Rfl:   •  valACYclovir (Valtrex) 1000 MG tablet, Use 2 tablets twice a day x1 day with outbreaks (Patient taking differently: Take 1,000 mg by mouth Daily As Needed (fever blisters). Use 2 tablets twice a day x1 day with outbreaks), Disp: 12 tablet, Rfl: 2  •  vitamin C  "(ASCORBIC ACID) 500 MG tablet, Take 1,000 mg by mouth Daily., Disp: , Rfl:   •  oxybutynin XL (Ditropan XL) 5 MG 24 hr tablet, Take 1 tablet by mouth Daily., Disp: 30 tablet, Rfl: 2    No LMP recorded. Patient has had a hysterectomy.    Sexual History:         Could not be calculated    Past Surgical History:   Procedure Laterality Date   • APPENDECTOMY  08/2008   • COLONOSCOPY N/A 5/23/2019    Procedure: COLONOSCOPY WITH ANESTHESIA;  Surgeon: Anni Smith MD;  Location: [facility] ENDOSCOPY;  Service: Gastroenterology   • HERNIA REPAIR      Done at same time as appendectomy   • HYSTERECTOMY      pt reports laparoscopic hysterectomy, BSO and a bladder suspension, hyst done for prolapse   • LASIK  2005     Review of Systems   Genitourinary: Positive for dyspareunia, frequency, urgency and urinary incontinence. Negative for dysuria, pelvic pain and pelvic pressure.     Objective   Physical Exam  Vitals and nursing note reviewed. Exam conducted with a chaperone present.   Constitutional:       General: She is not in acute distress.     Appearance: She is well-developed.   HENT:      Head: Normocephalic and atraumatic.   Pulmonary:      Effort: Pulmonary effort is normal.   Abdominal:      Palpations: Abdomen is soft.      Tenderness: There is no abdominal tenderness.   Genitourinary:     Exam position: Supine.      Labia:         Right: No tenderness or lesion.         Left: No tenderness or lesion.       Vagina: Normal. No signs of injury. No vaginal discharge, tenderness or bleeding.      Uterus: Absent.       Adnexa:         Right: No tenderness or fullness.          Left: No tenderness or fullness.        Comments: Grade 2 to 3 cystocele with valsalva, minimal uterine apical descent with valsalva, grade 1 rectocele          Vitals:    07/14/21 1426   BP: 142/74   BP Location: Left arm   Patient Position: Sitting   Weight: 84.4 kg (186 lb)   Height: 172.7 cm (68\")       Diagnoses and all orders for this visit:    1. " Urge incontinence (Primary)    2. Urinary urgency  -     Urine Culture - Urine, Urine, Clean Catch    Other orders  -     oxybutynin XL (Ditropan XL) 5 MG 24 hr tablet; Take 1 tablet by mouth Daily.  Dispense: 30 tablet; Refill: 2    Discussed with patient different types of incontinence at this time. Given her symptoms she likely has urge incontinence.   Discussed with patient different management for urge incontinence   Urine culture sent with results to follow   Discussed with patient side effects of Ditropan at this time   RTC in 2 months for follow up.   All questions answered and patient verbalized understanding of plan.     Isabel Kingston, DO

## 2021-07-17 LAB
BACTERIA UR CULT: ABNORMAL
BACTERIA UR CULT: ABNORMAL
OTHER ANTIBIOTIC SUSC ISLT: ABNORMAL

## 2021-07-19 DIAGNOSIS — N39.0 URINARY TRACT INFECTION WITHOUT HEMATURIA, SITE UNSPECIFIED: Primary | ICD-10-CM

## 2021-07-19 DIAGNOSIS — N39.41 URGE INCONTINENCE: ICD-10-CM

## 2021-07-19 DIAGNOSIS — R39.15 URINARY URGENCY: ICD-10-CM

## 2021-07-19 RX ORDER — NITROFURANTOIN 25; 75 MG/1; MG/1
100 CAPSULE ORAL 2 TIMES DAILY
Qty: 5 CAPSULE | Refills: 0 | Status: SHIPPED | OUTPATIENT
Start: 2021-07-19 | End: 2021-09-14

## 2021-07-19 NOTE — TELEPHONE ENCOUNTER
----- Message from Isabel Kingston DO sent at 7/19/2021  7:57 AM CDT -----  Please let the patient know that she has a urinary tract infection. Please send the patient Macrobid 100 mg bid for five days.

## 2021-07-21 ENCOUNTER — PROCEDURE VISIT (OUTPATIENT)
Dept: OTOLARYNGOLOGY | Facility: CLINIC | Age: 66
End: 2021-07-21

## 2021-07-21 DIAGNOSIS — D36.9 PAPILLOMA: Primary | ICD-10-CM

## 2021-07-21 DIAGNOSIS — K13.70 ORAL LESION: ICD-10-CM

## 2021-07-21 PROCEDURE — 40808 BIOPSY OF MOUTH LESION: CPT | Performed by: OTOLARYNGOLOGY

## 2021-07-21 PROCEDURE — 99213 OFFICE O/P EST LOW 20 MIN: CPT | Performed by: OTOLARYNGOLOGY

## 2021-07-21 NOTE — PROGRESS NOTES
[facility] Otolaryngology Head and Neck Surgery  CLINIC NOTE    Chief Complaint   Patient presents with   • Mouth Lesions          HPI   Follow up  Accompanied by: No one  Ave Singh is a 65 y.o. female who is here for follow up. She has had persistent lesion in the right buccal mucosa.  She complains of chewing on this lesion.  She feels is getting bigger.  She is status post No surgery found on No surgery found.                   Vital Signs:        Physical Exam  Vitals reviewed.   Constitutional:       General: She is not in acute distress.     Appearance: Normal appearance. She is well-developed and overweight.   HENT:      Head: Normocephalic and atraumatic.      Right Ear: Tympanic membrane, ear canal and external ear normal.      Left Ear: Tympanic membrane, ear canal and external ear normal. Decreased hearing noted.      Nose: Septal deviation (L to R mid and high- moderate, R to L low mild to mod) present.      Mouth/Throat:      Lips: Pink.      Mouth: Mucous membranes are moist. Oral lesions (R buccal, 1 cm dome shaped lesion with hyperkeratosis) present.      Dentition: Normal dentition. No dental tenderness, dental caries or gum lesions.      Tongue: No lesions. Tongue does not deviate from midline.      Palate: No mass and lesions.      Pharynx: Oropharynx is clear. Uvula midline.      Tonsils: No tonsillar exudate or tonsillar abscesses.      Comments: R anterior buccal lesion measuring 1 cm, exophytic firm, mobile lesion, with hyperkeratosis  Eyes:      General: Lids are normal. Gaze aligned appropriately.      Extraocular Movements: Extraocular movements intact.      Conjunctiva/sclera: Conjunctivae normal.      Comments: Color blue   Neck:      Thyroid: No thyroid mass or thyromegaly.   Cardiovascular:      Rate and Rhythm: Normal rate and regular rhythm.   Pulmonary:      Effort: Pulmonary effort is normal. No respiratory distress.      Breath sounds: Normal breath  sounds. No stridor.   Musculoskeletal:         General: Normal range of motion.      Cervical back: Normal range of motion.   Lymphadenopathy:      Cervical: No cervical adenopathy.   Skin:     Findings: No rash.   Neurological:      General: No focal deficit present.      Mental Status: She is alert and oriented to person, place, and time.      Cranial Nerves: Cranial nerves are intact. No cranial nerve deficit.      Gait: Gait is intact.   Psychiatric:         Attention and Perception: Attention and perception normal.         Behavior: Behavior normal. Behavior is cooperative.         Thought Content: Thought content normal.         Cognition and Memory: Cognition and memory normal.         Judgment: Judgment normal.               Result Review                Assessment:        Diagnosis Plan   1. Oral lesion  Tissue Pathology Exam              Plan:        Medical and surgical options were discussed including surgical management. Risks, benefits and alternatives were discussed and questions were answered. After considering the options, the patient decided to proceed with surgical management.  Patient has had the right buccal lesion surgically removed today in the office.  I will begin postop oral care.  Saline gargles  Aleve or Advil for pain  Call for any problems  We will call path report             MY CHART:  Patient is Patient is using My Chart    Patient understand(s) and agree(s) with the treatment plan as described.    Return Will call the path, for Recheck R oral.            Axel Brizuela Jr, MD  07/21/21  15:55 CDT

## 2021-07-21 NOTE — PROGRESS NOTES
[facility] Otolaryngology Head and Neck Surgery  PROCEDURE NOTE      Pre-operative Diagnosis: R buccal lesion  No diagnosis found.     Post-operative Diagnosis: same    Anesthesia: 1% lidocaine with 1:100,000 epinephrine    Procedure: Excisional biopsy of right buccal lesion    Estimated Blood Loss:  Minimal    Procedure Details:    Informed consent obtained.  The cheek was retracted.  The lesion was excised using the sharp scissors and taking full-thickness mucosa down into the subcutaneous fat.  Closure:  Inverted sutures of 3-0 chromic were used to close mucosa the mucosa.  Hemostasis was satisfactory    Findings:   1 cm exophytic, fibrotic lesion of the right anterior buccal surface    Condition:  Stable.  Patient tolerated procedure well.    Complications:  None  Post-procedure instructions reviewed with Patient  Instructions documented in AVS for patient to review

## 2021-07-21 NOTE — PATIENT INSTRUCTIONS
Salt water gargles    Aleve or advil for pain  Robby for problems    We will call you results of the biopsy    NASAL SALINE:  Use 2 puffs each nostril 4-6 times daily and more frequently if possible.  You can buy saline spray or you can make your own and use an old spray bottle to administer  Use a humidifier at bedside  Recipe for saline:  Water                                 1 quart  Salt (table)                        1 tablespoon  Gylcerin (or Esthela Syrup)    1 teaspoon  Sodium bicarbonate           1 teaspoon  Sprays or Worth pots are recommended      CONTACT INFORMATION:  The main office phone number is [phone]. For emergencies after hours and on weekends, this number will convert over to our answering service and the on call provider will answer. Please try to keep non emergent phone calls/ questions to office hours 9am-5pm Monday through Friday.     [employer]  As an alternative, you can sign up and use the Epic MyChart system for more direct and quicker access for non emergent questions/ problems.  [facility] [employer] allows you to send messages to your doctor, view your test results, renew your prescriptions, schedule appointments, and more. To sign up, go to [employer] and click on the Sign Up Now link in the New User? box. Enter your [employer] Activation Code exactly as it appears below along with the last four digits of your Social Security Number and your Date of Birth () to complete the sign-up process. If you do not sign up before the expiration date, you must request a new code.    [employer] Activation Code: Activation code not generated  Current [employer] Status: Active    If you have questions, you can email [email or web address] or call [phone] to talk to our [employer] staff. Remember, [employer] is NOT to be used for urgent needs. For medical emergencies, dial 911.

## 2021-07-23 PROCEDURE — 88305 TISSUE EXAM BY PATHOLOGIST: CPT | Performed by: OTOLARYNGOLOGY

## 2021-07-27 LAB
CYTO UR: NORMAL
LAB AP CASE REPORT: NORMAL
LAB AP CLINICAL INFORMATION: NORMAL
PATH REPORT.FINAL DX SPEC: NORMAL
PATH REPORT.GROSS SPEC: NORMAL

## 2021-07-29 ENCOUNTER — TELEPHONE (OUTPATIENT)
Dept: OTOLARYNGOLOGY | Facility: CLINIC | Age: 66
End: 2021-07-29

## 2021-07-29 NOTE — TELEPHONE ENCOUNTER
Patient notified, no follow up appt, patient will call if she needs anything    ----- Message from Axel Brizuela Jr., MD sent at 7/28/2021  5:55 PM CDT -----  Please call patient and tell her mouth lesion was fibroma and is benign.

## 2021-09-14 ENCOUNTER — OFFICE VISIT (OUTPATIENT)
Dept: OBSTETRICS AND GYNECOLOGY | Facility: CLINIC | Age: 66
End: 2021-09-14

## 2021-09-14 VITALS
BODY MASS INDEX: 29.1 KG/M2 | WEIGHT: 192 LBS | HEIGHT: 68 IN | DIASTOLIC BLOOD PRESSURE: 80 MMHG | SYSTOLIC BLOOD PRESSURE: 122 MMHG

## 2021-09-14 DIAGNOSIS — N39.41 URGE INCONTINENCE: Primary | ICD-10-CM

## 2021-09-14 PROCEDURE — 99212 OFFICE O/P EST SF 10 MIN: CPT | Performed by: OBSTETRICS & GYNECOLOGY

## 2021-09-14 RX ORDER — SOLIFENACIN SUCCINATE 5 MG/1
5 TABLET, FILM COATED ORAL DAILY
Qty: 30 TABLET | Refills: 2 | Status: SHIPPED | OUTPATIENT
Start: 2021-09-14 | End: 2021-12-14 | Stop reason: SDUPTHER

## 2021-09-14 NOTE — PROGRESS NOTES
"Subjective   Ave Singh is a 65 y.o. female.     Chief Complaint   Patient presents with   • Follow-up     Patient here for follow up after starting Oxybutynin 5mg for urge incontinence. Patient states she can maybe tell a slight difference but not alot.        65 year old female  s/p hysterectomy presents for follow up on urge incontinence. Patient reports that she has seen minimal improvement since her last office visit. She was treated for a urinary tract infection at her last office visit. She reports that she is still having leakage approximately 4 to 5 days. She reports having to get up twice at night.      Review of Systems   Genitourinary: Positive for urinary incontinence.     Objective   /80   Ht 172.7 cm (68\")   Wt 87.1 kg (192 lb)   Breastfeeding No   BMI 29.19 kg/m²   No LMP recorded. Patient has had a hysterectomy.  Physical Exam  Vitals and nursing note reviewed.   Constitutional:       General: She is not in acute distress.     Appearance: She is well-developed.   HENT:      Head: Normocephalic and atraumatic.   Eyes:      General:         Right eye: No discharge.         Left eye: No discharge.      Conjunctiva/sclera: Conjunctivae normal.   Pulmonary:      Effort: Pulmonary effort is normal.   Musculoskeletal:         General: Normal range of motion.      Cervical back: Normal range of motion and neck supple.   Skin:     General: Skin is warm and dry.   Neurological:      Mental Status: She is alert and oriented to person, place, and time.   Psychiatric:         Behavior: Behavior normal.         Judgment: Judgment normal.           Assessment/Plan   Problems Addressed this Visit     None      Visit Diagnoses     Urge incontinence    -  Primary    Relevant Orders    Urine Culture - Urine, Urine, Random Void      Diagnoses       Codes Comments    Urge incontinence    -  Primary ICD-10-CM: N39.41  ICD-9-CM: 788.31       Will resend urine culture at this time.   Plan to switch " patient to [facility].   Discussed interstim   RTC in 3 months for recheck or sooner if symptoms.        Isabel Kingston, DO

## 2021-09-17 ENCOUNTER — OFFICE VISIT (OUTPATIENT)
Dept: FAMILY MEDICINE CLINIC | Facility: CLINIC | Age: 66
End: 2021-09-17

## 2021-09-17 VITALS
OXYGEN SATURATION: 99 % | TEMPERATURE: 97.7 F | WEIGHT: 192 LBS | SYSTOLIC BLOOD PRESSURE: 144 MMHG | DIASTOLIC BLOOD PRESSURE: 78 MMHG | RESPIRATION RATE: 18 BRPM | HEART RATE: 52 BPM | BODY MASS INDEX: 29.1 KG/M2 | HEIGHT: 68 IN

## 2021-09-17 DIAGNOSIS — E78.2 MIXED HYPERLIPIDEMIA: ICD-10-CM

## 2021-09-17 DIAGNOSIS — B00.9 HERPES SIMPLEX: ICD-10-CM

## 2021-09-17 DIAGNOSIS — I10 ESSENTIAL HYPERTENSION: Primary | Chronic | ICD-10-CM

## 2021-09-17 DIAGNOSIS — E55.9 VITAMIN D DEFICIENCY: ICD-10-CM

## 2021-09-17 LAB
BACTERIA UR CULT: ABNORMAL
BACTERIA UR CULT: ABNORMAL
OTHER ANTIBIOTIC SUSC ISLT: ABNORMAL

## 2021-09-17 PROCEDURE — 99213 OFFICE O/P EST LOW 20 MIN: CPT | Performed by: NURSE PRACTITIONER

## 2021-09-17 RX ORDER — METOPROLOL SUCCINATE 100 MG/1
100 TABLET, EXTENDED RELEASE ORAL DAILY
Qty: 90 TABLET | Refills: 1 | Status: SHIPPED | OUTPATIENT
Start: 2021-09-17 | End: 2022-03-11 | Stop reason: SDUPTHER

## 2021-09-17 RX ORDER — LOVASTATIN 40 MG/1
40 TABLET ORAL NIGHTLY
Qty: 90 TABLET | Refills: 1 | Status: SHIPPED | OUTPATIENT
Start: 2021-09-17 | End: 2022-03-11 | Stop reason: SDUPTHER

## 2021-09-17 RX ORDER — LOSARTAN POTASSIUM AND HYDROCHLOROTHIAZIDE 25; 100 MG/1; MG/1
1 TABLET ORAL DAILY
Qty: 90 TABLET | Refills: 1 | Status: SHIPPED | OUTPATIENT
Start: 2021-09-17 | End: 2022-03-11 | Stop reason: SDUPTHER

## 2021-09-17 RX ORDER — VALACYCLOVIR HYDROCHLORIDE 1 G/1
TABLET, FILM COATED ORAL
Qty: 30 TABLET | Refills: 2 | Status: SHIPPED | OUTPATIENT
Start: 2021-09-17 | End: 2021-09-22 | Stop reason: SDUPTHER

## 2021-09-17 RX ORDER — HYDROCHLOROTHIAZIDE 25 MG/1
25 TABLET ORAL DAILY
Qty: 90 TABLET | Refills: 1 | Status: SHIPPED | OUTPATIENT
Start: 2021-09-17 | End: 2022-03-11 | Stop reason: SDUPTHER

## 2021-09-17 NOTE — PROGRESS NOTES
Chief Complaint  Hypertension (follow up)    Subjective          Ave Singh presents to [facility]  HTN with elevation today. 144/78.  Says that she has been checking bp at home and over the last couple days: 119/74, 122/80, 130/75, 125/76, denies chest pain, shortness of breath or palpitations. Hyperlipidemia, fever blisters,     Hypertension  This is a chronic problem. The problem is unchanged. The problem is controlled. Pertinent negatives include no blurred vision, chest pain, neck pain, orthopnea, PND or shortness of breath. There are no associated agents to hypertension. Risk factors for coronary artery disease include diabetes mellitus, dyslipidemia, sedentary lifestyle and post-menopausal state. Past treatments include diuretics and angiotensin blockers. Current antihypertension treatment includes ACE inhibitors, angiotensin blockers, diuretics and lifestyle changes. The current treatment provides mild improvement. There are no compliance problems.  There is no history of angina, kidney disease, heart failure or retinopathy. There is no history of chronic renal disease.   Hyperlipidemia  This is a chronic problem. The current episode started more than 1 year ago. The problem is controlled. Recent lipid tests were reviewed and are normal. She has no history of chronic renal disease, hypothyroidism or obesity. Pertinent negatives include no chest pain or shortness of breath. Current antihyperlipidemic treatment includes statins and fibric acid derivatives. The current treatment provides moderate improvement of lipids. There are no compliance problems.  Risk factors for coronary artery disease include dyslipidemia, hypertension and stress.     The following portions of the patient's history were reviewed and updated as appropriate: allergies, current medications, past family history, past medical history, past social history, past surgical history and problem  "list.      Objective   Vital Signs:   /78 (BP Location: Right arm, Patient Position: Sitting, Cuff Size: Large Adult)   Pulse 52   Temp 97.7 °F (36.5 °C) (Infrared)   Resp 18   Ht 172.7 cm (68\") Comment: per patient  Wt 87.1 kg (192 lb) Comment: per patient  SpO2 99%   BMI 29.19 kg/m²     Physical Exam  Vitals and nursing note reviewed.   Constitutional:       General: She is awake.      Appearance: Normal appearance. She is well-developed.   HENT:      Head: Normocephalic and atraumatic.      Right Ear: Hearing normal.      Left Ear: Hearing normal.      Nose: Nose normal.      Mouth/Throat:      Lips: Pink.      Pharynx: Oropharynx is clear.   Cardiovascular:      Rate and Rhythm: Normal rate and regular rhythm.      Heart sounds: Normal heart sounds.   Pulmonary:      Effort: Pulmonary effort is normal.      Breath sounds: Normal breath sounds and air entry.   Abdominal:      General: Abdomen is flat. Bowel sounds are normal.      Palpations: Abdomen is soft.   Musculoskeletal:      Right lower leg: No edema.      Left lower leg: No edema.   Lymphadenopathy:      Head:      Right side of head: No submental, submandibular or tonsillar adenopathy.      Left side of head: No submental, submandibular or tonsillar adenopathy.   Skin:     General: Skin is warm and dry.   Neurological:      Mental Status: She is alert and oriented to person, place, and time.      Cranial Nerves: Cranial nerves are intact.      Sensory: Sensation is intact.      Motor: Motor function is intact.      Coordination: Coordination is intact.   Psychiatric:         Attention and Perception: Attention and perception normal.         Mood and Affect: Mood and affect normal.         Speech: Speech normal.         Behavior: Behavior normal. Behavior is cooperative.         Thought Content: Thought content normal.         Cognition and Memory: Cognition normal.         Judgment: Judgment normal.        Result Review :               "   Assessment and Plan    Diagnoses and all orders for this visit:    1. Essential hypertension (Primary)  -     losartan-hydrochlorothiazide (Hyzaar) 100-25 MG per tablet; Take 1 tablet by mouth Daily. Delete existing refills.  Dispense: 90 tablet; Refill: 1  -     hydroCHLOROthiazide (HYDRODIURIL) 25 MG tablet; Take 1 tablet by mouth Daily.  Dispense: 90 tablet; Refill: 1  -     lovastatin (MEVACOR) 40 MG tablet; Take 1 tablet by mouth Every Night. Delete existing refills.  Dispense: 90 tablet; Refill: 1  -     metoprolol succinate XL (Toprol XL) 100 MG 24 hr tablet; Take 1 tablet by mouth Daily. Delete existing refills.  Dispense: 90 tablet; Refill: 1  -     CBC (No Diff), CMP ordered in computer    2. Mixed hyperlipidemia         -    Lipid ordered          -    Continue lovastatin as prescribed         -    Continue omega 3 fatty acids    3. Vitamin D deficiency        -     Continue ergocalciferol as prescribed    4. Herpes simplex  -     valACYclovir (Valtrex) 1000 MG tablet; Use 2 tablets twice a day x1 day with outbreaks  Dispense: 30 tablet; Refill: 2      I spent 13 minutes caring for Ave on this date of service. This time includes time spent by me in the following activities:preparing for the visit, performing a medically appropriate examination and/or evaluation , ordering medications, tests, or procedures, documenting information in the medical record and care coordination  Follow Up   Return in about 1 week (around 9/24/2021). if bp elevates  Patient was given instructions and counseling r egarding her condition or for health maintenance advice. Please see specific information pulled into the AVS if appropriate.     Electronically signed by Yolanda Grant, BIBIANA, APRN, 09/26/21, 5:57 PM CDT.

## 2021-09-20 ENCOUNTER — TELEPHONE (OUTPATIENT)
Dept: OBSTETRICS AND GYNECOLOGY | Facility: CLINIC | Age: 66
End: 2021-09-20

## 2021-09-20 DIAGNOSIS — B96.20 E. COLI UTI (URINARY TRACT INFECTION): Primary | ICD-10-CM

## 2021-09-20 DIAGNOSIS — E78.2 MIXED HYPERLIPIDEMIA: Primary | ICD-10-CM

## 2021-09-20 DIAGNOSIS — N39.0 E. COLI UTI (URINARY TRACT INFECTION): Primary | ICD-10-CM

## 2021-09-20 RX ORDER — FENOFIBRATE 145 MG/1
145 TABLET, COATED ORAL DAILY
Qty: 90 TABLET | Refills: 0 | Status: SHIPPED | OUTPATIENT
Start: 2021-09-20 | End: 2021-12-13 | Stop reason: SDUPTHER

## 2021-09-20 RX ORDER — NITROFURANTOIN 25; 75 MG/1; MG/1
100 CAPSULE ORAL 2 TIMES DAILY
Qty: 14 CAPSULE | Refills: 0 | Status: SHIPPED | OUTPATIENT
Start: 2021-09-20 | End: 2021-11-23

## 2021-09-20 NOTE — TELEPHONE ENCOUNTER
----- Message from Isabel Kingston DO sent at 9/20/2021 12:56 PM CDT -----  Please let the patient know that she has a UTI. Please send in the patient Macrobid 100 mg bid for seven days.

## 2021-09-22 ENCOUNTER — OFFICE VISIT (OUTPATIENT)
Dept: FAMILY MEDICINE CLINIC | Facility: CLINIC | Age: 66
End: 2021-09-22

## 2021-09-22 VITALS
BODY MASS INDEX: 29.4 KG/M2 | HEART RATE: 89 BPM | DIASTOLIC BLOOD PRESSURE: 86 MMHG | OXYGEN SATURATION: 96 % | HEIGHT: 68 IN | WEIGHT: 194 LBS | TEMPERATURE: 99.6 F | SYSTOLIC BLOOD PRESSURE: 154 MMHG

## 2021-09-22 DIAGNOSIS — B00.9 HERPES SIMPLEX: ICD-10-CM

## 2021-09-22 DIAGNOSIS — R05.9 COUGH: Primary | ICD-10-CM

## 2021-09-22 LAB — SARS-COV-2 ORF1AB RESP QL NAA+PROBE: DETECTED

## 2021-09-22 PROCEDURE — 99213 OFFICE O/P EST LOW 20 MIN: CPT | Performed by: NURSE PRACTITIONER

## 2021-09-22 PROCEDURE — U0005 INFEC AGEN DETEC AMPLI PROBE: HCPCS | Performed by: NURSE PRACTITIONER

## 2021-09-22 PROCEDURE — U0004 COV-19 TEST NON-CDC HGH THRU: HCPCS | Performed by: NURSE PRACTITIONER

## 2021-09-22 RX ORDER — CEFDINIR 300 MG/1
300 CAPSULE ORAL 2 TIMES DAILY
Qty: 20 CAPSULE | Refills: 0 | Status: SHIPPED | OUTPATIENT
Start: 2021-09-22 | End: 2021-12-14

## 2021-09-22 RX ORDER — METHYLPREDNISOLONE 4 MG/1
TABLET ORAL
Qty: 21 TABLET | Refills: 0 | Status: SHIPPED | OUTPATIENT
Start: 2021-09-22 | End: 2021-12-14

## 2021-09-22 RX ORDER — VALACYCLOVIR HYDROCHLORIDE 1 G/1
TABLET, FILM COATED ORAL
Qty: 30 TABLET | Refills: 2 | Status: SHIPPED | OUTPATIENT
Start: 2021-09-22

## 2021-09-22 RX ORDER — BROMPHENIRAMINE MALEATE, PSEUDOEPHEDRINE HYDROCHLORIDE, AND DEXTROMETHORPHAN HYDROBROMIDE 2; 30; 10 MG/5ML; MG/5ML; MG/5ML
5 SYRUP ORAL 4 TIMES DAILY PRN
Qty: 118 ML | Refills: 0 | Status: SHIPPED | OUTPATIENT
Start: 2021-09-22 | End: 2021-12-14

## 2021-09-22 RX ORDER — BENZONATATE 100 MG/1
100 CAPSULE ORAL 3 TIMES DAILY PRN
Qty: 21 CAPSULE | Refills: 0 | Status: SHIPPED | OUTPATIENT
Start: 2021-09-22 | End: 2021-12-14

## 2021-09-22 NOTE — PROGRESS NOTES
"Chief Complaint  URI (cough,chest congestion started sunday )    Subjective          Ave Singh presents to [facility] for uri.   History of Present Illness  uri  New. Started 3 days ago. Worsening. Reports low grade fever (today), cough, congestion. No shortness of breath, gi issues or changes in taste or smell.   Has not tried anything to help.     Objective   Vital Signs:   /86 (BP Location: Right arm, Patient Position: Sitting, Cuff Size: Adult)   Pulse 89   Temp 99.6 °F (37.6 °C) (Temporal)   Ht 172.7 cm (68\")   Wt 88 kg (194 lb)   SpO2 96%   BMI 29.50 kg/m²     Physical Exam  Vitals and nursing note reviewed.   Constitutional:       General: She is not in acute distress.     Appearance: She is well-developed. She is ill-appearing.   HENT:      Right Ear: Tympanic membrane and ear canal normal.      Left Ear: Tympanic membrane and ear canal normal.      Nose: Congestion and rhinorrhea present.      Right Sinus: No maxillary sinus tenderness or frontal sinus tenderness.      Left Sinus: No maxillary sinus tenderness or frontal sinus tenderness.      Mouth/Throat:      Mouth: Mucous membranes are moist.      Pharynx: Oropharynx is clear. Uvula midline. No uvula swelling.   Eyes:      Conjunctiva/sclera: Conjunctivae normal.   Neck:      Thyroid: No thyromegaly.      Trachea: No tracheal deviation.   Cardiovascular:      Rate and Rhythm: Normal rate and regular rhythm.      Heart sounds: Normal heart sounds.   Pulmonary:      Effort: Pulmonary effort is normal.      Breath sounds: Normal breath sounds.   Musculoskeletal:      Cervical back: Neck supple.   Lymphadenopathy:      Cervical: No cervical adenopathy.   Skin:     General: Skin is warm and dry.   Neurological:      Mental Status: She is alert.   Psychiatric:         Behavior: Behavior normal.        Result Review :                 Assessment and Plan    Diagnoses and all orders for this visit:    1. " Cough (Primary)  -     COVID-19,APTIMA PANTHER,PAD IN-HOUSE,NP/OP/NASAL SWAB IN UTM/VTM/SALINE/LIQUID AMIES TRANSPORT MEDIA/NP WASH OR ASPIRATE, 24 HR TAT - Swab, Nasal Cavity    2. Herpes simplex  -     valACYclovir (Valtrex) 1000 MG tablet; Use 2 tablets twice a day x1 day with outbreaks  Dispense: 30 tablet; Refill: 2    Other orders  -     cefdinir (OMNICEF) 300 MG capsule; Take 1 capsule by mouth 2 (Two) Times a Day.  Dispense: 20 capsule; Refill: 0  -     methylPREDNISolone (MEDROL) 4 MG dose pack; Take as directed on package instructions.  Dispense: 21 tablet; Refill: 0  -     brompheniramine-pseudoephedrine-DM 30-2-10 MG/5ML syrup; Take 5 mL by mouth 4 (Four) Times a Day As Needed for Congestion or Cough.  Dispense: 118 mL; Refill: 0  -     benzonatate (Tessalon Perles) 100 MG capsule; Take 1 capsule by mouth 3 (Three) Times a Day As Needed for Cough.  Dispense: 21 capsule; Refill: 0      Blood pressure is mildly elevated. This is likely d/t illness. Pt follows with pcp regularly.         Follow Up   Return in about 1 week (around 9/29/2021), or if symptoms worsen or fail to improve.  Patient was given instructions and counseling regarding her condition or for health maintenance advice. Please see specific information pulled into the AVS if appropriate.

## 2021-09-23 ENCOUNTER — HOSPITAL ENCOUNTER (OUTPATIENT)
Dept: INFUSION THERAPY | Age: 66
Setting detail: INFUSION SERIES
Discharge: HOME | End: 2021-09-23
Payer: MEDICARE

## 2021-09-23 VITALS
DIASTOLIC BLOOD PRESSURE: 67 MMHG | SYSTOLIC BLOOD PRESSURE: 105 MMHG | HEART RATE: 70 BPM | RESPIRATION RATE: 17 BRPM | OXYGEN SATURATION: 95 % | TEMPERATURE: 97.1 F

## 2021-09-23 DIAGNOSIS — U07.1 COVID-19: ICD-10-CM

## 2021-09-23 DIAGNOSIS — U07.1 COVID-19: Primary | ICD-10-CM

## 2021-09-23 PROBLEM — I10 HYPERTENSION: Status: ACTIVE | Noted: 2018-10-23

## 2021-09-23 PROBLEM — R00.2 PALPITATIONS: Status: ACTIVE | Noted: 2019-03-13

## 2021-09-23 PROBLEM — M72.2 PLANTAR FASCIITIS, BILATERAL: Status: ACTIVE | Noted: 2018-10-23

## 2021-09-23 PROBLEM — R94.31 ABNORMAL EKG: Status: ACTIVE | Noted: 2019-03-13

## 2021-09-23 PROBLEM — R06.83 SNORING: Status: ACTIVE | Noted: 2019-03-13

## 2021-09-23 PROBLEM — R19.5 POSITIVE COLORECTAL CANCER SCREENING USING COLOGUARD TEST: Status: ACTIVE | Noted: 2019-05-16

## 2021-09-23 PROBLEM — M81.0 OSTEOPOROSIS: Status: ACTIVE | Noted: 2018-10-23

## 2021-09-23 PROBLEM — R53.83 OTHER FATIGUE: Status: ACTIVE | Noted: 2019-03-13

## 2021-09-23 PROBLEM — Z86.010 HX OF COLONIC POLYPS: Status: ACTIVE | Noted: 2019-05-16

## 2021-09-23 PROBLEM — R06.02 SHORTNESS OF BREATH: Status: ACTIVE | Noted: 2019-03-13

## 2021-09-23 PROBLEM — R73.03 PREDIABETES: Status: ACTIVE | Noted: 2020-06-10

## 2021-09-23 PROBLEM — E55.9 VITAMIN D DEFICIENCY: Status: ACTIVE | Noted: 2018-10-23

## 2021-09-23 PROCEDURE — M0245 HC IV INFUSION BAMLANIVIMAB & ETESEVIMAB W/MONITORING: HCPCS

## 2021-09-23 PROCEDURE — 6360000002 HC RX W HCPCS: Performed by: NURSE PRACTITIONER

## 2021-09-23 PROCEDURE — 2500000003 HC RX 250 WO HCPCS: Performed by: NURSE PRACTITIONER

## 2021-09-23 PROCEDURE — 6370000000 HC RX 637 (ALT 250 FOR IP): Performed by: NURSE PRACTITIONER

## 2021-09-23 PROCEDURE — 2580000003 HC RX 258: Performed by: NURSE PRACTITIONER

## 2021-09-23 PROCEDURE — 96374 THER/PROPH/DIAG INJ IV PUSH: CPT

## 2021-09-23 RX ORDER — DIPHENHYDRAMINE HYDROCHLORIDE 50 MG/ML
25 INJECTION INTRAMUSCULAR; INTRAVENOUS ONCE
Status: CANCELLED | OUTPATIENT
Start: 2021-09-23

## 2021-09-23 RX ORDER — SODIUM CHLORIDE 0.9 % (FLUSH) 0.9 %
5-40 SYRINGE (ML) INJECTION PRN
Status: CANCELLED | OUTPATIENT
Start: 2021-09-23

## 2021-09-23 RX ORDER — MELOXICAM 15 MG/1
TABLET ORAL
COMMUNITY
Start: 2021-07-02

## 2021-09-23 RX ORDER — LOSARTAN POTASSIUM AND HYDROCHLOROTHIAZIDE 25; 100 MG/1; MG/1
1 TABLET ORAL DAILY
COMMUNITY
Start: 2021-09-17 | End: 2022-09-17

## 2021-09-23 RX ORDER — BROMPHENIRAMINE MALEATE, PSEUDOEPHEDRINE HYDROCHLORIDE, AND DEXTROMETHORPHAN HYDROBROMIDE 2; 30; 10 MG/5ML; MG/5ML; MG/5ML
5 SYRUP ORAL 4 TIMES DAILY PRN
COMMUNITY
Start: 2021-09-22

## 2021-09-23 RX ORDER — FENOFIBRATE 145 MG/1
145 TABLET, COATED ORAL DAILY
COMMUNITY
Start: 2021-09-20

## 2021-09-23 RX ORDER — DIPHENHYDRAMINE HYDROCHLORIDE 50 MG/ML
50 INJECTION INTRAMUSCULAR; INTRAVENOUS ONCE
Status: CANCELLED | OUTPATIENT
Start: 2021-09-23 | End: 2021-09-23

## 2021-09-23 RX ORDER — CEFDINIR 300 MG/1
300 CAPSULE ORAL 2 TIMES DAILY
COMMUNITY
Start: 2021-09-22

## 2021-09-23 RX ORDER — SODIUM CHLORIDE 9 MG/ML
INJECTION, SOLUTION INTRAVENOUS CONTINUOUS
Status: CANCELLED | OUTPATIENT
Start: 2021-09-23

## 2021-09-23 RX ORDER — EPINEPHRINE 1 MG/ML
0.3 INJECTION, SOLUTION, CONCENTRATE INTRAVENOUS PRN
Status: CANCELLED | OUTPATIENT
Start: 2021-09-23

## 2021-09-23 RX ORDER — NITROFURANTOIN 25; 75 MG/1; MG/1
100 CAPSULE ORAL 2 TIMES DAILY
COMMUNITY
Start: 2021-09-20

## 2021-09-23 RX ORDER — SOLIFENACIN SUCCINATE 5 MG/1
5 TABLET, FILM COATED ORAL DAILY
COMMUNITY
Start: 2021-09-14 | End: 2021-09-23

## 2021-09-23 RX ORDER — ACETAMINOPHEN 325 MG/1
650 TABLET ORAL ONCE
Status: CANCELLED | OUTPATIENT
Start: 2021-09-23

## 2021-09-23 RX ORDER — DIPHENHYDRAMINE HYDROCHLORIDE 50 MG/ML
25 INJECTION INTRAMUSCULAR; INTRAVENOUS ONCE
Status: COMPLETED | OUTPATIENT
Start: 2021-09-23 | End: 2021-09-23

## 2021-09-23 RX ORDER — VALACYCLOVIR HYDROCHLORIDE 1 G/1
TABLET, FILM COATED ORAL
COMMUNITY
Start: 2021-09-22

## 2021-09-23 RX ORDER — ASPIRIN 81 MG/1
81 TABLET, CHEWABLE ORAL DAILY
COMMUNITY

## 2021-09-23 RX ORDER — ASCORBIC ACID 500 MG
1000 TABLET ORAL DAILY
COMMUNITY

## 2021-09-23 RX ORDER — ACETAMINOPHEN 325 MG/1
650 TABLET ORAL ONCE
Status: COMPLETED | OUTPATIENT
Start: 2021-09-23 | End: 2021-09-23

## 2021-09-23 RX ORDER — METOPROLOL SUCCINATE 100 MG/1
100 TABLET, EXTENDED RELEASE ORAL DAILY
COMMUNITY
Start: 2021-09-17

## 2021-09-23 RX ORDER — SODIUM CHLORIDE 9 MG/ML
INJECTION, SOLUTION INTRAVENOUS CONTINUOUS
Status: ACTIVE | OUTPATIENT
Start: 2021-09-23 | End: 2021-09-23

## 2021-09-23 RX ORDER — LOVASTATIN 40 MG/1
40 TABLET ORAL NIGHTLY
COMMUNITY
Start: 2021-09-17

## 2021-09-23 RX ORDER — SODIUM CHLORIDE 0.9 % (FLUSH) 0.9 %
5-40 SYRINGE (ML) INJECTION PRN
Status: DISCONTINUED | OUTPATIENT
Start: 2021-09-23 | End: 2021-09-24 | Stop reason: HOSPADM

## 2021-09-23 RX ORDER — BENZONATATE 100 MG/1
CAPSULE ORAL
COMMUNITY
Start: 2021-09-22

## 2021-09-23 RX ORDER — METHYLPREDNISOLONE SODIUM SUCCINATE 125 MG/2ML
125 INJECTION, POWDER, LYOPHILIZED, FOR SOLUTION INTRAMUSCULAR; INTRAVENOUS ONCE
Status: CANCELLED | OUTPATIENT
Start: 2021-09-23 | End: 2021-09-23

## 2021-09-23 RX ORDER — OXYBUTYNIN CHLORIDE 5 MG/1
TABLET, EXTENDED RELEASE ORAL
COMMUNITY
Start: 2021-08-11

## 2021-09-23 RX ADMIN — ACETAMINOPHEN 650 MG: 325 TABLET ORAL at 10:04

## 2021-09-23 RX ADMIN — DIPHENHYDRAMINE HYDROCHLORIDE 25 MG: 50 INJECTION, SOLUTION INTRAMUSCULAR; INTRAVENOUS at 10:04

## 2021-09-23 RX ADMIN — SODIUM CHLORIDE: 9 INJECTION, SOLUTION INTRAVENOUS at 10:47

## 2021-09-23 RX ADMIN — SODIUM CHLORIDE: 9 INJECTION, SOLUTION INTRAVENOUS at 10:04

## 2021-09-23 ASSESSMENT — PAIN SCALES - GENERAL: PAINLEVEL_OUTOF10: 0

## 2021-09-23 NOTE — PROGRESS NOTES
Patient tolerated monoclonal antibody infusion therapy without s/s of adverse reaction. Patient observed one hour post observation. Discharge instructions provided. Patient verbalizes understanding of discharge instructions and advice to seek emergent medical care in the event of adverse reaction (fever, chills, changes in heartbeat, shortness of air, wheezing, swelling of lips, face or throat, rash including hives, itching, headache, nausea, vomiting, sweating, muscle aches, dizziness and shivering. Patient discharged from monoclonal clinic to home.  Electronically signed by Deo Roberts RN on 9/23/2021 at 12:12 PM

## 2021-09-23 NOTE — PROGRESS NOTES
Patient/Caregiver given FACT SHEET for EMERGENCY USE AUTHORIZATION for monoclonal antibody therapy. Patient/Caregiver verbalize understanding of EMERGENCY USE AUTHORIZATION and understand full FDA approval has not been granted because medication is still being studied. Patient/Caregiver verbalize understanding regarding known safety and effectiveness and understand limited data is available regarding full risks and benefits. Patient/Caregiver understand there is a continued need to self-isolate and continue all infection control measures. Patient/Caregiver choose to proceed with monoclonal antibody infusion therapy. Patient/Caregiver signed a copy of the fact sheet for emergency use authorization. Copy placed in patient medical record.  Electronically signed by Litzy Pugh RN on 9/23/2021 at 10:20 AM

## 2021-11-23 ENCOUNTER — OFFICE VISIT (OUTPATIENT)
Dept: FAMILY MEDICINE CLINIC | Facility: CLINIC | Age: 66
End: 2021-11-23

## 2021-11-23 VITALS
SYSTOLIC BLOOD PRESSURE: 152 MMHG | HEART RATE: 67 BPM | HEIGHT: 68 IN | WEIGHT: 192.2 LBS | TEMPERATURE: 96.5 F | BODY MASS INDEX: 29.13 KG/M2 | RESPIRATION RATE: 20 BRPM | OXYGEN SATURATION: 98 % | DIASTOLIC BLOOD PRESSURE: 87 MMHG

## 2021-11-23 DIAGNOSIS — R35.0 FREQUENCY OF URINATION: Primary | ICD-10-CM

## 2021-11-23 LAB
BILIRUB BLD-MCNC: NEGATIVE MG/DL
CLARITY, POC: CLEAR
COLOR UR: YELLOW
EXPIRATION DATE: ABNORMAL
GLUCOSE UR STRIP-MCNC: NEGATIVE MG/DL
KETONES UR QL: ABNORMAL
LEUKOCYTE EST, POC: ABNORMAL
Lab: ABNORMAL
NITRITE UR-MCNC: NEGATIVE MG/ML
PH UR: 5.5 [PH] (ref 5–8)
PROT UR STRIP-MCNC: ABNORMAL MG/DL
RBC # UR STRIP: ABNORMAL /UL
SP GR UR: 1.03 (ref 1–1.03)
UROBILINOGEN UR QL: NORMAL

## 2021-11-23 PROCEDURE — 81003 URINALYSIS AUTO W/O SCOPE: CPT | Performed by: NURSE PRACTITIONER

## 2021-11-23 PROCEDURE — 99213 OFFICE O/P EST LOW 20 MIN: CPT | Performed by: NURSE PRACTITIONER

## 2021-11-23 RX ORDER — DIPHENOXYLATE HYDROCHLORIDE AND ATROPINE SULFATE 2.5; .025 MG/1; MG/1
1 TABLET ORAL DAILY
COMMUNITY
End: 2023-03-02 | Stop reason: SDUPTHER

## 2021-11-23 RX ORDER — NITROFURANTOIN 25; 75 MG/1; MG/1
100 CAPSULE ORAL 2 TIMES DAILY
Qty: 14 CAPSULE | Refills: 0 | Status: SHIPPED | OUTPATIENT
Start: 2021-11-23 | End: 2021-11-30

## 2021-11-23 RX ORDER — PHENAZOPYRIDINE HYDROCHLORIDE 95 MG/1
95 TABLET ORAL 3 TIMES DAILY PRN
Qty: 6 TABLET | Refills: 0 | Status: SHIPPED | OUTPATIENT
Start: 2021-11-23 | End: 2021-11-25

## 2021-11-23 RX ORDER — OXYBUTYNIN CHLORIDE 5 MG/1
TABLET, EXTENDED RELEASE ORAL
COMMUNITY
Start: 2021-08-11 | End: 2021-12-14

## 2021-11-23 NOTE — PROGRESS NOTES
"Chief Complaint  Urinary Tract Infection (started saturday night with frequency symptoms)    Subjective          Ave Singh presents to [facility]  History of Present Illness  uti  New. Started 3 days ago with dysuria and frequency. No fever, flank pain, hematuria, vaginal discharge, n/v.       She sees Dr. Kingston as her GYN and she has been treating her for urinary frequency and urgency.  She states that she has been doing well with the new medication she had given her however she started developing UTI symptoms on Saturday.  States that she took an Azo and it helped her symptoms but she ran out and they returned on Sunday.  She states that every time she has had a urine culture done that showed E. coli.  She had been given Macrobid back in September with Dr. Marroquin and then this cleared her UTI at the time.      Objective   Vital Signs:   /87 (BP Location: Left arm, Patient Position: Sitting, Cuff Size: Adult)   Pulse 67   Temp 96.5 °F (35.8 °C) (Temporal)   Resp 20   Ht 172.7 cm (68\") Comment: LA  Wt 87.2 kg (192 lb 3.2 oz)   SpO2 98%   BMI 29.22 kg/m²     Physical Exam  Vitals and nursing note reviewed.   Constitutional:       General: She is not in acute distress.     Appearance: She is well-developed.   Pulmonary:      Effort: Pulmonary effort is normal.   Abdominal:      General: Bowel sounds are normal.      Palpations: Abdomen is soft. There is no mass.      Tenderness: There is abdominal tenderness in the suprapubic area. There is no right CVA tenderness or left CVA tenderness.   Skin:     General: Skin is warm and dry.   Neurological:      Mental Status: She is alert.        Result Review :                 Assessment and Plan    Diagnoses and all orders for this visit:    1. Frequency of urination (Primary)  -     POCT urinalysis dipstick, automated  -     Urine Culture - Urine, Urine, Clean Catch    Other orders  -     phenazopyridine " (PYRIDIUM) 95 MG tablet; Take 1 tablet by mouth 3 (Three) Times a Day As Needed for Bladder Spasms for up to 2 days.  Dispense: 6 tablet; Refill: 0  -     nitrofurantoin, macrocrystal-monohydrate, (Macrobid) 100 MG capsule; Take 1 capsule by mouth 2 (Two) Times a Day for 7 days.  Dispense: 14 capsule; Refill: 0    Plan:  Initiate Macrobid, send urine for culture, Azo for bladder spasms and pain relief.  If symptoms persist return for reevaluation      Follow Up   Return in about 1 week (around 11/30/2021), or if symptoms worsen or fail to improve.  Patient was given instructions and counseling regarding her condition or for health maintenance advice. Please see specific information pulled into the AVS if appropriate.

## 2021-11-27 LAB
BACTERIA UR CULT: ABNORMAL
BACTERIA UR CULT: ABNORMAL
OTHER ANTIBIOTIC SUSC ISLT: ABNORMAL

## 2021-12-06 ENCOUNTER — TELEMEDICINE (OUTPATIENT)
Dept: FAMILY MEDICINE CLINIC | Facility: CLINIC | Age: 66
End: 2021-12-06

## 2021-12-06 DIAGNOSIS — N30.00 ACUTE CYSTITIS WITHOUT HEMATURIA: Primary | ICD-10-CM

## 2021-12-06 PROCEDURE — 99213 OFFICE O/P EST LOW 20 MIN: CPT | Performed by: NURSE PRACTITIONER

## 2021-12-06 RX ORDER — CIPROFLOXACIN 250 MG/1
250 TABLET, FILM COATED ORAL 2 TIMES DAILY
Qty: 10 TABLET | Refills: 0 | Status: SHIPPED | OUTPATIENT
Start: 2021-12-06 | End: 2022-04-12

## 2021-12-06 NOTE — PROGRESS NOTES
CC: dysuria    History:  Ave Singh is a 66 y.o. female who presents today for evaluation of the above problems.      Was treated with nitrofurantoin for UTI starting on the 23rd. Symptoms did improve, but came back very quickly after finishing medication. Culture shows intermediate response of urine to nitrofurantoin. Patient does have multiple allergies. Has been up 4-5 times per night with voiding as well as dysuria when she does go.     HPI  ROS:  Review of Systems   Genitourinary: Positive for dysuria and frequency.       Allergies   Allergen Reactions   • Amoxicillin Rash   • Clavulanic Acid Rash   • Erythromycin Rash   • Penicillins Rash     Past Medical History:   Diagnosis Date   • Abnormal EKG 3/13/2019   • GERD (gastroesophageal reflux disease)    • Herpes simplex    • Hyperlipidemia    • Hypertension    • Left bundle branch block 3/13/2019     Past Surgical History:   Procedure Laterality Date   • APPENDECTOMY  08/2008   • COLONOSCOPY N/A 5/23/2019    Procedure: COLONOSCOPY WITH ANESTHESIA;  Surgeon: Anni Smith MD;  Location: [facility];  Service: Gastroenterology   • HERNIA REPAIR      Done at same time as appendectomy   • HYSTERECTOMY      pt reports laparoscopic hysterectomy, BSO and a bladder suspension, hyst done for prolapse   • LASIK  2005     Family History   Problem Relation Age of Onset   • Cancer Mother    • Breast cancer Mother    • Heart disease Father    • Heart attack Father 62   • Hypertension Brother    • Cancer Maternal Aunt    • Cancer Maternal Grandmother    • Colon polyps Neg Hx    • Colon cancer Neg Hx       reports that she has never smoked. She has never used smokeless tobacco. She reports that she does not drink alcohol and does not use drugs.      Current Outpatient Medications:   •  acyclovir (ZOVIRAX) 5 % cream, Apply  topically to the appropriate area as directed 4 (Four) Times a Day., Disp: 5 g, Rfl: 3  •  aspirin 81 MG chewable tablet, Chew 81 mg Daily.,  Disp: , Rfl:   •  benzonatate (Tessalon Perles) 100 MG capsule, Take 1 capsule by mouth 3 (Three) Times a Day As Needed for Cough., Disp: 21 capsule, Rfl: 0  •  brompheniramine-pseudoephedrine-DM 30-2-10 MG/5ML syrup, Take 5 mL by mouth 4 (Four) Times a Day As Needed for Congestion or Cough., Disp: 118 mL, Rfl: 0  •  cefdinir (OMNICEF) 300 MG capsule, Take 1 capsule by mouth 2 (Two) Times a Day., Disp: 20 capsule, Rfl: 0  •  Cholecalciferol (Vitamin D3) 250 MCG (89916 UT) tablet, Take 1,000 Units by mouth Daily., Disp: , Rfl:   •  cimetidine (TAGAMET) 200 MG tablet, Take 200 mg by mouth Daily As Needed., Disp: , Rfl:   •  ciprofloxacin (Cipro) 250 MG tablet, Take 1 tablet by mouth 2 (Two) Times a Day., Disp: 10 tablet, Rfl: 0  •  fenofibrate (Tricor) 145 MG tablet, Take 1 tablet by mouth Daily., Disp: 90 tablet, Rfl: 0  •  fluticasone (FLONASE) 50 MCG/ACT nasal spray, 2 sprays into the nostril(s) as directed by provider Daily As Needed for Rhinitis or Allergies., Disp: , Rfl:   •  hydroCHLOROthiazide (HYDRODIURIL) 25 MG tablet, Take 1 tablet by mouth Daily., Disp: 90 tablet, Rfl: 1  •  loratadine (CLARITIN) 10 MG tablet, Take 1 tablet by mouth Daily., Disp: 90 tablet, Rfl: 2  •  losartan-hydrochlorothiazide (Hyzaar) 100-25 MG per tablet, Take 1 tablet by mouth Daily. Delete existing refills., Disp: 90 tablet, Rfl: 1  •  lovastatin (MEVACOR) 40 MG tablet, Take 1 tablet by mouth Every Night. Delete existing refills., Disp: 90 tablet, Rfl: 1  •  meloxicam (MOBIC) 15 MG tablet, , Disp: , Rfl:   •  methylPREDNISolone (MEDROL) 4 MG dose pack, Take as directed on package instructions., Disp: 21 tablet, Rfl: 0  •  metoprolol succinate XL (Toprol XL) 100 MG 24 hr tablet, Take 1 tablet by mouth Daily. Delete existing refills., Disp: 90 tablet, Rfl: 1  •  Multiple Vitamins-Minerals (MULTIVITAMIN ADULT PO), Take 1 tablet by mouth Daily., Disp: , Rfl:   •  multivitamin (THERAGRAN) tablet tablet, Take 1 tablet by mouth Daily.,  Disp: , Rfl:   •  Omega-3 Fatty Acids (fish oil) 1200 MG capsule capsule, Take 1 tablet by mouth Daily., Disp: , Rfl:   •  oxybutynin XL (DITROPAN-XL) 5 MG 24 hr tablet, , Disp: , Rfl:   •  solifenacin (VESIcare) 5 MG tablet, Take 1 tablet by mouth Daily., Disp: 30 tablet, Rfl: 2  •  valACYclovir (Valtrex) 1000 MG tablet, Use 2 tablets twice a day x1 day with outbreaks, Disp: 30 tablet, Rfl: 2  •  vitamin C (ASCORBIC ACID) 500 MG tablet, Take 1,000 mg by mouth Daily., Disp: , Rfl:     OBJECTIVE:  There were no vitals taken for this visit.   Physical Exam  Constitutional:       Appearance: She is well-developed.   Pulmonary:      Effort: Pulmonary effort is normal.   Neurological:      Mental Status: She is alert and oriented to person, place, and time.   Psychiatric:         Behavior: Behavior normal.         Assessment/Plan    Diagnoses and all orders for this visit:    1. Acute cystitis without hematuria (Primary)  -     ciprofloxacin (Cipro) 250 MG tablet; Take 1 tablet by mouth 2 (Two) Times a Day.  Dispense: 10 tablet; Refill: 0    This visit was completed via secure Zoom connection.       An After Visit Summary was printed and given to the patient at discharge.  Return if symptoms worsen or fail to improve, for Next scheduled follow up.       MARY Granger 12/6/21    Electronically signed.

## 2021-12-13 DIAGNOSIS — E78.2 MIXED HYPERLIPIDEMIA: ICD-10-CM

## 2021-12-13 RX ORDER — FENOFIBRATE 145 MG/1
145 TABLET, COATED ORAL DAILY
Qty: 90 TABLET | Refills: 0 | Status: SHIPPED | OUTPATIENT
Start: 2021-12-13 | End: 2022-03-11 | Stop reason: SDUPTHER

## 2021-12-13 NOTE — TELEPHONE ENCOUNTER
Rx Refill Note  Requested Prescriptions     Pending Prescriptions Disp Refills   • fenofibrate (Tricor) 145 MG tablet 90 tablet 0     Sig: Take 1 tablet by mouth Daily.      Last office visit with prescribing clinician: 9/17/2021      Next office visit with prescribing clinician: 6/3/2022            Heather Schmidt LPN  12/13/21, 08:31 CST

## 2021-12-13 NOTE — TELEPHONE ENCOUNTER
Caller: Ave Singh    Relationship: Self    Best call back number: [phone]    Requested Prescriptions:   Requested Prescriptions     Pending Prescriptions Disp Refills   • fenofibrate (Tricor) 145 MG tablet 90 tablet 0     Sig: Take 1 tablet by mouth Daily.        Pharmacy where request should be sent: [facility] [address] [phone] [facility] [phone]      Additional details provided by patient:   N/A    Does the patient have less than a 3 day supply:  [x] Yes  [] No    Goyo Valerio Rep   12/13/21 08:01 CST

## 2021-12-14 ENCOUNTER — OFFICE VISIT (OUTPATIENT)
Dept: OBSTETRICS AND GYNECOLOGY | Facility: CLINIC | Age: 66
End: 2021-12-14

## 2021-12-14 VITALS
HEIGHT: 68 IN | SYSTOLIC BLOOD PRESSURE: 120 MMHG | WEIGHT: 193 LBS | BODY MASS INDEX: 29.25 KG/M2 | DIASTOLIC BLOOD PRESSURE: 78 MMHG

## 2021-12-14 DIAGNOSIS — R82.998 OTHER ABNORMAL FINDINGS IN URINE: ICD-10-CM

## 2021-12-14 DIAGNOSIS — N39.41 URGE INCONTINENCE: Primary | ICD-10-CM

## 2021-12-14 DIAGNOSIS — Z87.440 HISTORY OF UTI: ICD-10-CM

## 2021-12-14 PROCEDURE — 99213 OFFICE O/P EST LOW 20 MIN: CPT | Performed by: OBSTETRICS & GYNECOLOGY

## 2021-12-14 RX ORDER — CEPHALEXIN 250 MG/1
250 CAPSULE ORAL DAILY
Qty: 30 CAPSULE | Refills: 3 | Status: SHIPPED | OUTPATIENT
Start: 2021-12-14 | End: 2022-03-11

## 2021-12-14 RX ORDER — SOLIFENACIN SUCCINATE 5 MG/1
5 TABLET, FILM COATED ORAL DAILY
Qty: 90 TABLET | Refills: 1 | Status: SHIPPED | OUTPATIENT
Start: 2021-12-14 | End: 2022-04-12 | Stop reason: SDUPTHER

## 2021-12-14 NOTE — PROGRESS NOTES
"Subjective   Ave Singh is a 66 y.o. female.     Chief Complaint   Patient presents with   • Follow-up     Patient here for 3 month follow up after switching to Vesicare for urge incontinence. Patient states she is doing well with Vesicare and that is is helping. Patient states she did have a UTI that she was treated by her PCP, patient took Omnicef and Cipro. Urine culture showed e coli on 21.      66 year old female  s/p hysterectomy presents for follow up on urge incontinence. Patient reports she is doing well on Vesicare. She reports that she is no longer getting up at night to go the bathroom. She reports that she no longer has an accidents during the days. She reports that she has seen 95% improvement. She does report that she has noticed some symptoms of dryness but that they are not enough to discontinue the medication. She also reports that she was treated for several urinary tract infections since . She reports that she just recently completed a second course of antibiotics from a UTI at the end of 2021.        Review of Systems   Genitourinary: Positive for dysuria, frequency, urgency and urinary incontinence.       Objective   /78   Ht 172.7 cm (68\")   Wt 87.5 kg (193 lb)   Breastfeeding No   BMI 29.35 kg/m²   No LMP recorded. Patient has had a hysterectomy.  Physical Exam  Vitals and nursing note reviewed.   Constitutional:       General: She is not in acute distress.     Appearance: She is well-developed.   HENT:      Head: Normocephalic and atraumatic.   Eyes:      General:         Right eye: No discharge.         Left eye: No discharge.      Conjunctiva/sclera: Conjunctivae normal.   Pulmonary:      Effort: Pulmonary effort is normal.   Musculoskeletal:         General: Normal range of motion.      Cervical back: Normal range of motion and neck supple.   Skin:     General: Skin is warm and dry.   Neurological:      Mental Status: She is alert.   Psychiatric:   "       Behavior: Behavior normal.         Judgment: Judgment normal.           Assessment/Plan   Problems Addressed this Visit     None      Visit Diagnoses     Urge incontinence    -  Primary    History of UTI        Relevant Medications    cephalexin (Keflex) 250 MG capsule    Other Relevant Orders    Urine Culture - Urine, Urine, Clean Catch    Other abnormal findings in urine         Relevant Orders    Urine Culture - Urine, Urine, Clean Catch      Diagnoses       Codes Comments    Urge incontinence    -  Primary ICD-10-CM: N39.41  ICD-9-CM: 788.31     History of UTI     ICD-10-CM: Z87.440  ICD-9-CM: V13.02     Other abnormal findings in urine      ICD-10-CM: R82.998  ICD-9-CM: 791.9       Refill on vesicare sent at this time.   Discussed with patient suppressive therapy for recurrent urinary tract infection. She has previously tolerated cephalosporins previously.   Will resent urine culture with results to follow   Sent Keflex 250 mg for patient to take daily.   RTC in 3 months for follow up or sooner if symptoms worsen.   All questions answered and patient verbalized understanding of plan,          Isabel Kingston, DO

## 2021-12-16 LAB
BACTERIA UR CULT: NO GROWTH
BACTERIA UR CULT: NORMAL

## 2022-03-11 ENCOUNTER — OFFICE VISIT (OUTPATIENT)
Dept: FAMILY MEDICINE CLINIC | Facility: CLINIC | Age: 67
End: 2022-03-11

## 2022-03-11 VITALS
HEIGHT: 68 IN | WEIGHT: 194 LBS | BODY MASS INDEX: 29.4 KG/M2 | OXYGEN SATURATION: 99 % | RESPIRATION RATE: 18 BRPM | SYSTOLIC BLOOD PRESSURE: 139 MMHG | TEMPERATURE: 96.4 F | HEART RATE: 58 BPM | DIASTOLIC BLOOD PRESSURE: 82 MMHG

## 2022-03-11 DIAGNOSIS — E55.9 VITAMIN D DEFICIENCY: ICD-10-CM

## 2022-03-11 DIAGNOSIS — E66.3 OVERWEIGHT (BMI 25.0-29.9): ICD-10-CM

## 2022-03-11 DIAGNOSIS — E78.2 MIXED HYPERLIPIDEMIA: ICD-10-CM

## 2022-03-11 DIAGNOSIS — I10 ESSENTIAL HYPERTENSION: Primary | Chronic | ICD-10-CM

## 2022-03-11 PROBLEM — M67.00 CONTRACTURE OF ACHILLES TENDON: Status: ACTIVE | Noted: 2018-09-18

## 2022-03-11 PROBLEM — M20.40 HAMMER TOE: Status: ACTIVE | Noted: 2017-01-27

## 2022-03-11 PROBLEM — M79.673 FOOT PAIN: Status: ACTIVE | Noted: 2018-09-18

## 2022-03-11 PROBLEM — M72.2 PLANTAR FASCIITIS: Status: ACTIVE | Noted: 2018-09-18

## 2022-03-11 PROBLEM — M20.5X9 MALLET TOE: Status: ACTIVE | Noted: 2017-01-27

## 2022-03-11 PROBLEM — M77.30 CALCANEAL SPUR: Status: ACTIVE | Noted: 2018-09-18

## 2022-03-11 PROCEDURE — 99214 OFFICE O/P EST MOD 30 MIN: CPT | Performed by: NURSE PRACTITIONER

## 2022-03-11 RX ORDER — LOSARTAN POTASSIUM AND HYDROCHLOROTHIAZIDE 25; 100 MG/1; MG/1
1 TABLET ORAL DAILY
Qty: 90 TABLET | Refills: 1 | Status: SHIPPED | OUTPATIENT
Start: 2022-03-11 | End: 2022-08-24 | Stop reason: SDUPTHER

## 2022-03-11 RX ORDER — METOPROLOL SUCCINATE 100 MG/1
100 TABLET, EXTENDED RELEASE ORAL DAILY
Qty: 90 TABLET | Refills: 1 | Status: SHIPPED | OUTPATIENT
Start: 2022-03-11 | End: 2022-08-24 | Stop reason: SDUPTHER

## 2022-03-11 RX ORDER — LOVASTATIN 40 MG/1
40 TABLET ORAL NIGHTLY
Qty: 90 TABLET | Refills: 1 | Status: SHIPPED | OUTPATIENT
Start: 2022-03-11 | End: 2022-08-24 | Stop reason: SDUPTHER

## 2022-03-11 RX ORDER — HYDROCHLOROTHIAZIDE 25 MG/1
25 TABLET ORAL DAILY
Qty: 90 TABLET | Refills: 1 | Status: SHIPPED | OUTPATIENT
Start: 2022-03-11 | End: 2022-08-24 | Stop reason: SDUPTHER

## 2022-03-11 RX ORDER — FENOFIBRATE 145 MG/1
145 TABLET, COATED ORAL DAILY
Qty: 90 TABLET | Refills: 0 | Status: SHIPPED | OUTPATIENT
Start: 2022-03-11 | End: 2022-06-01

## 2022-03-11 NOTE — PATIENT INSTRUCTIONS
"Hypertension, Adult  High blood pressure (hypertension) is when the force of blood pumping through the arteries is too strong. The arteries are the blood vessels that carry blood from the heart throughout the body. Hypertension forces the heart to work harder to pump blood and may cause arteries to become narrow or stiff. Untreated or uncontrolled hypertension can cause a heart attack, heart failure, a stroke, kidney disease, and other problems.  A blood pressure reading consists of a higher number over a lower number. Ideally, your blood pressure should be below 120/80. The first (\"top\") number is called the systolic pressure. It is a measure of the pressure in your arteries as your heart beats. The second (\"bottom\") number is called the diastolic pressure. It is a measure of the pressure in your arteries as the heart relaxes.  What are the causes?  The exact cause of this condition is not known. There are some conditions that result in or are related to high blood pressure.  What increases the risk?  Some risk factors for high blood pressure are under your control. The following factors may make you more likely to develop this condition:  Smoking.  Having type 2 diabetes mellitus, high cholesterol, or both.  Not getting enough exercise or physical activity.  Being overweight.  Having too much fat, sugar, calories, or salt (sodium) in your diet.  Drinking too much alcohol.  Some risk factors for high blood pressure may be difficult or impossible to change. Some of these factors include:  Having chronic kidney disease.  Having a family history of high blood pressure.  Age. Risk increases with age.  Race. You may be at higher risk if you are .  Gender. Men are at higher risk than women before age 45. After age 65, women are at higher risk than men.  Having obstructive sleep apnea.  Stress.  What are the signs or symptoms?  High blood pressure may not cause symptoms. Very high blood pressure " (hypertensive crisis) may cause:  Headache.  Anxiety.  Shortness of breath.  Nosebleed.  Nausea and vomiting.  Vision changes.  Severe chest pain.  Seizures.  How is this diagnosed?  This condition is diagnosed by measuring your blood pressure while you are seated, with your arm resting on a flat surface, your legs uncrossed, and your feet flat on the floor. The cuff of the blood pressure monitor will be placed directly against the skin of your upper arm at the level of your heart. It should be measured at least twice using the same arm. Certain conditions can cause a difference in blood pressure between your right and left arms.  Certain factors can cause blood pressure readings to be lower or higher than normal for a short period of time:  When your blood pressure is higher when you are in a health care provider's office than when you are at home, this is called white coat hypertension. Most people with this condition do not need medicines.  When your blood pressure is higher at home than when you are in a health care provider's office, this is called masked hypertension. Most people with this condition may need medicines to control blood pressure.  If you have a high blood pressure reading during one visit or you have normal blood pressure with other risk factors, you may be asked to:  Return on a different day to have your blood pressure checked again.  Monitor your blood pressure at home for 1 week or longer.  If you are diagnosed with hypertension, you may have other blood or imaging tests to help your health care provider understand your overall risk for other conditions.  How is this treated?  This condition is treated by making healthy lifestyle changes, such as eating healthy foods, exercising more, and reducing your alcohol intake. Your health care provider may prescribe medicine if lifestyle changes are not enough to get your blood pressure under control, and if:  Your systolic blood pressure is above  130.  Your diastolic blood pressure is above 80.  Your personal target blood pressure may vary depending on your medical conditions, your age, and other factors.  Follow these instructions at home:  Eating and drinking    Eat a diet that is high in fiber and potassium, and low in sodium, added sugar, and fat. An example eating plan is called the DASH (Dietary Approaches to Stop Hypertension) diet. To eat this way:  Eat plenty of fresh fruits and vegetables. Try to fill one half of your plate at each meal with fruits and vegetables.  Eat whole grains, such as whole-wheat pasta, brown rice, or whole-grain bread. Fill about one fourth of your plate with whole grains.  Eat or drink low-fat dairy products, such as skim milk or low-fat yogurt.  Avoid fatty cuts of meat, processed or cured meats, and poultry with skin. Fill about one fourth of your plate with lean proteins, such as fish, chicken without skin, beans, eggs, or tofu.  Avoid pre-made and processed foods. These tend to be higher in sodium, added sugar, and fat.  Reduce your daily sodium intake. Most people with hypertension should eat less than 1,500 mg of sodium a day.  Do not drink alcohol if:  Your health care provider tells you not to drink.  You are pregnant, may be pregnant, or are planning to become pregnant.  If you drink alcohol:  Limit how much you use to:  0-1 drink a day for women.  0-2 drinks a day for men.  Be aware of how much alcohol is in your drink. In the [place] one drink equals one 12 oz bottle of beer (355 mL), one 5 oz glass of wine (148 mL), or one 1½ oz glass of hard liquor (44 mL).    Lifestyle    Work with your health care provider to maintain a healthy body weight or to lose weight. Ask what an ideal weight is for you.  Get at least 30 minutes of exercise most days of the week. Activities may include walking, swimming, or biking.  Include exercise to strengthen your muscles (resistance exercise), such as Pilates or lifting weights, as  part of your weekly exercise routine. Try to do these types of exercises for 30 minutes at least 3 days a week.  Do not use any products that contain nicotine or tobacco, such as cigarettes, e-cigarettes, and chewing tobacco. If you need help quitting, ask your health care provider.  Monitor your blood pressure at home as told by your health care provider.  Keep all follow-up visits as told by your health care provider. This is important.    Medicines  Take over-the-counter and prescription medicines only as told by your health care provider. Follow directions carefully. Blood pressure medicines must be taken as prescribed.  Do not skip doses of blood pressure medicine. Doing this puts you at risk for problems and can make the medicine less effective.  Ask your health care provider about side effects or reactions to medicines that you should watch for.  Contact a health care provider if you:  Think you are having a reaction to a medicine you are taking.  Have headaches that keep coming back (recurring).  Feel dizzy.  Have swelling in your ankles.  Have trouble with your vision.  Get help right away if you:  Develop a severe headache or confusion.  Have unusual weakness or numbness.  Feel faint.  Have severe pain in your chest or abdomen.  Vomit repeatedly.  Have trouble breathing.  Summary  Hypertension is when the force of blood pumping through your arteries is too strong. If this condition is not controlled, it may put you at risk for serious complications.  Your personal target blood pressure may vary depending on your medical conditions, your age, and other factors. For most people, a normal blood pressure is less than 120/80.  Hypertension is treated with lifestyle changes, medicines, or a combination of both. Lifestyle changes include losing weight, eating a healthy, low-sodium diet, exercising more, and limiting alcohol.  This information is not intended to replace advice given to you by your health care  provider. Make sure you discuss any questions you have with your health care provider.  Document Revised: 08/28/2019 Document Reviewed: 08/28/2019  Elsevier Patient Education © 2021 [employer]

## 2022-03-11 NOTE — PROGRESS NOTES
Chief Complaint  Hypertension (Hypertension follow up.)    Subjective          Ave Singh presents to [facility]  Here for follow up for chronic problems of htn stable with losartan-hctz, hctz, metoprolol, hyperlipidemia stable with omega 3 fatty acids, lovastatin and fenofibrate, vit d def stable with cholelccalciferol, gerd stable with tagament, Denies chest pain, shortness of breath, or palpitations.  Says she is doing well.  Has been vaccinated against covid.  Due for a booster    Hypertension  This is a chronic problem. The current episode started more than 1 year ago. The problem has been gradually improving since onset. The problem is controlled. Pertinent negatives include no blurred vision, chest pain, neck pain, orthopnea, PND or shortness of breath. There are no associated agents to hypertension. Risk factors for coronary artery disease include dyslipidemia, post-menopausal state and sedentary lifestyle. Past treatments include diuretics and angiotensin blockers. Current antihypertension treatment includes angiotensin blockers and diuretics. The current treatment provides mild improvement. There are no compliance problems.  There is no history of kidney disease, heart failure or retinopathy. There is no history of chronic renal disease or hyperaldosteronism.   Hyperlipidemia  This is a chronic problem. The current episode started more than 1 year ago. The problem is uncontrolled. Recent lipid tests were reviewed and are high. Exacerbating diseases include obesity. She has no history of chronic renal disease or hypothyroidism. There are no known factors aggravating her hyperlipidemia. Pertinent negatives include no chest pain or shortness of breath. Current antihyperlipidemic treatment includes fibric acid derivatives, diet change and bile acid sequestrants. The current treatment provides mild improvement of lipids. There are no compliance problems.  Risk  "factors for coronary artery disease include dyslipidemia, family history, hypertension, obesity and post-menopausal.   Heartburn  She reports no chest pain. This is a chronic problem. The current episode started more than 1 year ago. The problem occurs occasionally. The problem has been rapidly improving. The symptoms are aggravated by certain foods. There are no known risk factors. She has tried an antacid and a PPI for the symptoms. The treatment provided mild relief. Past invasive treatments do not include gastroplasty, gastroplication or reflux surgery.     The following portions of the patient's history were reviewed and updated as appropriate: allergies, current medications, past family history, past medical history, past social history, past surgical history and problem list.    Objective   Vital Signs:   /82 (BP Location: Left arm, Patient Position: Sitting, Cuff Size: Large Adult)   Pulse 58   Temp 96.4 °F (35.8 °C) (Infrared)   Resp 18   Ht 172.7 cm (68\") Comment: per patient  Wt 88 kg (194 lb)   SpO2 99%   BMI 29.50 kg/m²     Physical Exam  Vitals and nursing note reviewed.   Constitutional:       General: She is awake.      Appearance: Normal appearance. She is well-developed and well-groomed.   HENT:      Head: Normocephalic and atraumatic.      Right Ear: Hearing, tympanic membrane, ear canal and external ear normal.      Left Ear: Hearing, tympanic membrane, ear canal and external ear normal.      Nose: Nose normal.      Mouth/Throat:      Lips: Pink.      Pharynx: Oropharynx is clear.   Eyes:      General: Lids are normal.      Conjunctiva/sclera: Conjunctivae normal.   Cardiovascular:      Rate and Rhythm: Normal rate and regular rhythm.      Heart sounds: Normal heart sounds.   Pulmonary:      Effort: Pulmonary effort is normal.      Breath sounds: Normal breath sounds and air entry.   Musculoskeletal:      Cervical back: Full passive range of motion without pain.      Right lower leg: " No edema.      Left lower leg: No edema.   Lymphadenopathy:      Head:      Right side of head: No submental, submandibular or tonsillar adenopathy.      Left side of head: No submental, submandibular or tonsillar adenopathy.   Skin:     General: Skin is warm and dry.   Neurological:      Mental Status: She is alert.      Sensory: Sensation is intact.      Motor: Motor function is intact.      Coordination: Coordination is intact.      Gait: Gait is intact.   Psychiatric:         Attention and Perception: Attention and perception normal.         Mood and Affect: Mood and affect normal.         Speech: Speech normal.         Behavior: Behavior normal. Behavior is cooperative.         Thought Content: Thought content normal.         Cognition and Memory: Cognition and memory normal.         Judgment: Judgment normal.        Result Review :                 Assessment and Plan    Diagnoses and all orders for this visit:    1. Essential hypertension (Primary)  -     hydroCHLOROthiazide (HYDRODIURIL) 25 MG tablet; Take 1 tablet by mouth Daily.  Dispense: 90 tablet; Refill: 1  -     losartan-hydrochlorothiazide (Hyzaar) 100-25 MG per tablet; Take 1 tablet by mouth Daily. Delete existing refills.  Dispense: 90 tablet; Refill: 1  -     lovastatin (MEVACOR) 40 MG tablet; Take 1 tablet by mouth Every Night. Delete existing refills.  Dispense: 90 tablet; Refill: 1  -     metoprolol succinate XL (Toprol XL) 100 MG 24 hr tablet; Take 1 tablet by mouth Daily. Delete existing refills.  Dispense: 90 tablet; Refill: 1  -     CBC (No Diff)  -     Comprehensive metabolic panel    2. Mixed hyperlipidemia  -     fenofibrate (Tricor) 145 MG tablet; Take 1 tablet by mouth Daily.  Dispense: 90 tablet; Refill: 0  -     Lipid panel    3. Vitamin D deficiency  -     Vitamin D 25 Hydroxy    Answers for HPI/ROS submitted by the patient on 3/11/2022  Please describe your symptoms.: Checkup and prescription refills  Have you had these symptoms  before?: Yes  How long have you been having these symptoms?: Greater than 2 weeks  Please list any medications you are currently taking for this condition.: On file  What is the primary reason for your visit?: Other    I spent 14 minutes caring for Ave on this date of service. This time includes time spent by me in the following activities:preparing for the visit, performing a medically appropriate examination and/or evaluation , counseling and educating the patient/family/caregiver, ordering medications, tests, or procedures, documenting information in the medical record and care coordination     Follow Up     Return in about 6 months (around 9/11/2022) for Recheck.     Patient was given instructions and counseling regarding her condition or for health maintenance advice. Please see specific information pulled into the AVS if appropriate.       Electronically signed by Yolanda Grant, BIBIANA, APRN, 03/11/22, 1:03 PM CST.

## 2022-03-12 LAB
25(OH)D3+25(OH)D2 SERPL-MCNC: 54.4 NG/ML (ref 30–100)
ALBUMIN SERPL-MCNC: 4.6 G/DL (ref 3.5–5.2)
ALBUMIN/GLOB SERPL: 1.8 G/DL
ALP SERPL-CCNC: 44 U/L (ref 39–117)
ALT SERPL-CCNC: 41 U/L (ref 1–33)
AST SERPL-CCNC: 29 U/L (ref 1–32)
BILIRUB SERPL-MCNC: 0.6 MG/DL (ref 0–1.2)
BUN SERPL-MCNC: 29 MG/DL (ref 8–23)
BUN/CREAT SERPL: 23.8 (ref 7–25)
CALCIUM SERPL-MCNC: 10 MG/DL (ref 8.6–10.5)
CHLORIDE SERPL-SCNC: 102 MMOL/L (ref 98–107)
CHOLEST SERPL-MCNC: 155 MG/DL (ref 0–200)
CO2 SERPL-SCNC: 26.2 MMOL/L (ref 22–29)
CREAT SERPL-MCNC: 1.22 MG/DL (ref 0.57–1)
EGFR GENE MUT ANL BLD/T: 48.7 ML/MIN/1.73
ERYTHROCYTE [DISTWIDTH] IN BLOOD BY AUTOMATED COUNT: 12.9 % (ref 12.3–15.4)
GLOBULIN SER CALC-MCNC: 2.5 GM/DL
GLUCOSE SERPL-MCNC: 128 MG/DL (ref 65–99)
HCT VFR BLD AUTO: 40.9 % (ref 34–46.6)
HDLC SERPL-MCNC: 35 MG/DL (ref 40–60)
HGB BLD-MCNC: 13.1 G/DL (ref 12–15.9)
LDLC SERPL CALC-MCNC: 89 MG/DL (ref 0–100)
MCH RBC QN AUTO: 28 PG (ref 26.6–33)
MCHC RBC AUTO-ENTMCNC: 32 G/DL (ref 31.5–35.7)
MCV RBC AUTO: 87.4 FL (ref 79–97)
PLATELET # BLD AUTO: 232 10*3/MM3 (ref 140–450)
POTASSIUM SERPL-SCNC: 4.6 MMOL/L (ref 3.5–5.2)
PROT SERPL-MCNC: 7.1 G/DL (ref 6–8.5)
RBC # BLD AUTO: 4.68 10*6/MM3 (ref 3.77–5.28)
SODIUM SERPL-SCNC: 141 MMOL/L (ref 136–145)
TRIGL SERPL-MCNC: 180 MG/DL (ref 0–150)
VLDLC SERPL CALC-MCNC: 31 MG/DL (ref 5–40)
WBC # BLD AUTO: 6.2 10*3/MM3 (ref 3.4–10.8)

## 2022-03-15 ENCOUNTER — TELEPHONE (OUTPATIENT)
Dept: FAMILY MEDICINE CLINIC | Facility: CLINIC | Age: 67
End: 2022-03-15

## 2022-03-15 NOTE — TELEPHONE ENCOUNTER
Pharmacy Name:  [facility]     Pharmacy representative name:  ABBE     Pharmacy representative phone number: [phone] [facility] [address]    REFERENCE NUMBER [id number]    What medication are you calling in regards to:   SHE STATES THERE ARE TWO MEDICATIONS THAT SHE NEEDS CLARIFICATION FOR ONE IS A COMBO MEDICATION AND SHE WANTS TO CLARIFY WHICH MEDICATION SHE SHOULD BE TAKING     What question does the pharmacy have: NEEDS CLARIFICATION OF MEDICATION WHICH MEDICATION SHE SHOULD BE TAKING LOSARTIN OR HCTZ     Who is the provider that prescribed the medication: SHASHI     Additional notes:

## 2022-03-16 NOTE — TELEPHONE ENCOUNTER
Called pt to inquire-pt reports that she takes both losartan-hydrochlorothiazide and hydrochlorothiazide. Pt wanted to know if there was an alternative to taking both pills, if there was just one pill she could take instead?     Also pt has reviewed her lab work and wanted to know if bladder medication and daily antibiotic she was taking (keflex for 3 mo-completed  3/11/22) couldv'e effected kidney number?

## 2022-03-17 NOTE — TELEPHONE ENCOUNTER
Called pt to notify- pt states that she will just continue on current regimen with both losartan-hydrochlorothiazide and hydrochlorothiazide. No other questions or concerns at this time.    Spoke with [facility] to notify that pt is taking both medications. Profile updated and will process to ship to pt.

## 2022-03-21 DIAGNOSIS — N28.9 ABNORMAL KIDNEY FUNCTION: Primary | ICD-10-CM

## 2022-04-04 ENCOUNTER — TELEPHONE (OUTPATIENT)
Dept: FAMILY MEDICINE CLINIC | Facility: CLINIC | Age: 67
End: 2022-04-04

## 2022-04-04 NOTE — TELEPHONE ENCOUNTER
Called pt back to advise that lab order has been placed and is a fasting lab. No other questions/concerns at this time.

## 2022-04-04 NOTE — TELEPHONE ENCOUNTER
Caller: Ave Singh    Relationship to patient: Self    Best call back number: [phone]    Patient is needing to know if the repeat labs for her kidneys has an active order, and also if she is needing to be fasting for these labs ? Please advise.

## 2022-04-12 ENCOUNTER — OFFICE VISIT (OUTPATIENT)
Dept: OBSTETRICS AND GYNECOLOGY | Facility: CLINIC | Age: 67
End: 2022-04-12

## 2022-04-12 VITALS
WEIGHT: 195 LBS | SYSTOLIC BLOOD PRESSURE: 120 MMHG | BODY MASS INDEX: 29.55 KG/M2 | DIASTOLIC BLOOD PRESSURE: 72 MMHG | HEIGHT: 68 IN

## 2022-04-12 DIAGNOSIS — N39.41 URGE INCONTINENCE: Primary | ICD-10-CM

## 2022-04-12 DIAGNOSIS — N94.10 FEMALE DYSPAREUNIA: ICD-10-CM

## 2022-04-12 DIAGNOSIS — Z87.440 HISTORY OF UTI: ICD-10-CM

## 2022-04-12 PROCEDURE — 99213 OFFICE O/P EST LOW 20 MIN: CPT | Performed by: OBSTETRICS & GYNECOLOGY

## 2022-04-12 RX ORDER — CEPHALEXIN 250 MG/1
250 CAPSULE ORAL DAILY
COMMUNITY
End: 2022-04-12 | Stop reason: SDUPTHER

## 2022-04-12 RX ORDER — SOLIFENACIN SUCCINATE 5 MG/1
5 TABLET, FILM COATED ORAL DAILY
Qty: 90 TABLET | Refills: 1 | Status: SHIPPED | OUTPATIENT
Start: 2022-04-12 | End: 2022-08-02 | Stop reason: SDUPTHER

## 2022-04-12 RX ORDER — CONJUGATED ESTROGENS 0.62 MG/G
CREAM VAGINAL DAILY
Qty: 4 G | Refills: 1 | COMMUNITY
Start: 2022-04-12 | End: 2022-08-02

## 2022-04-12 RX ORDER — CEPHALEXIN 250 MG/1
250 CAPSULE ORAL DAILY
Qty: 30 CAPSULE | Refills: 2 | Status: SHIPPED | OUTPATIENT
Start: 2022-04-12 | End: 2022-08-02 | Stop reason: SDUPTHER

## 2022-04-12 NOTE — PROGRESS NOTES
"Subjective   Ave Singh is a 66 y.o. female.     Chief Complaint   Patient presents with   • Follow-up     Patient here for 3 month follow up on Vesicare. Patient states she is doing well with medication, is rarely having leaking and no longer deals with urgency. Only complaint is dry mouth.    • Dyspareunia     Patient has c/o painful intercourse.        66 year old postmenopausal female para 2 presents for follow up on urinary incontinence. Patient reports that she is rarely having episodes of incontinence. She reports she does not dry mouth but that this side effect is not bad enough to where she would like to discontinue the medication. She has also been on suppressive therapy for recurrent UTIs. She also reports dyspareunia that has been worsening over the past year. She also reports that she also has pain with insertion.        Review of Systems   Genitourinary: Positive for dyspareunia and vaginal pain.       Objective   /72   Ht 172.7 cm (68\")   Wt 88.5 kg (195 lb)   Breastfeeding No   BMI 29.65 kg/m²   No LMP recorded. Patient has had a hysterectomy.  Physical Exam  Vitals and nursing note reviewed. Exam conducted with a chaperone present.   Constitutional:       General: She is not in acute distress.     Appearance: She is well-developed.   HENT:      Head: Normocephalic and atraumatic.   Pulmonary:      Effort: Pulmonary effort is normal.   Abdominal:      Palpations: Abdomen is soft.      Tenderness: There is no abdominal tenderness.   Genitourinary:     Exam position: Supine.      Labia:         Right: No tenderness or lesion.         Left: No tenderness or lesion.       Vagina: Normal. No signs of injury. No vaginal discharge, tenderness or bleeding.      Uterus: Absent.       Adnexa:         Right: No tenderness or fullness.          Left: No tenderness or fullness.        Comments: Cystocele noted           Assessment/Plan   Problems Addressed this Visit    None     Visit Diagnoses "     Urge incontinence    -  Primary    Female dyspareunia        Relevant Medications    conjugated estrogens (Premarin) 0.625 MG/GM vaginal cream    History of UTI        Relevant Medications    cephalexin (KEFLEX) 250 MG capsule      Diagnoses       Codes Comments    Urge incontinence    -  Primary ICD-10-CM: N39.41  ICD-9-CM: 788.31     Female dyspareunia     ICD-10-CM: N94.10  ICD-9-CM: 625.0     History of UTI     ICD-10-CM: Z87.440  ICD-9-CM: V13.02       Refill of Vesicare sent   Refill of Keflex for her UTI sent   Patient given sample of premarin. Discussed with patient risk, benefits   RTC in 2 months for recheck or sooner if symptoms worsen.        Isabel Kingston, DO

## 2022-05-09 ENCOUNTER — OFFICE VISIT (OUTPATIENT)
Dept: OTOLARYNGOLOGY | Facility: CLINIC | Age: 67
End: 2022-05-09

## 2022-05-09 VITALS — DIASTOLIC BLOOD PRESSURE: 78 MMHG | SYSTOLIC BLOOD PRESSURE: 154 MMHG | HEART RATE: 70 BPM | TEMPERATURE: 97.4 F

## 2022-05-09 DIAGNOSIS — C44.319 BASAL CELL CARCINOMA (BCC) OF LEFT FOREHEAD: Primary | ICD-10-CM

## 2022-05-09 PROCEDURE — 99213 OFFICE O/P EST LOW 20 MIN: CPT | Performed by: OTOLARYNGOLOGY

## 2022-05-09 NOTE — PROGRESS NOTES
PRIMARY CARE PROVIDER: Yolanda Grant, DNP, APRN  REFERRING PROVIDER: No ref. provider found    Chief Complaint   Patient presents with   • Basal Cell Carcinoma     Bcc to forehead       Subjective   History of Present Illness:  Ave Singh is a  66 y.o. female who presents for surgical consultation regarding a biopsy-proven basal cell carcinoma of the forehead.  Prior to the biopsy, the lesion had the following characteristics:    Quality: slightly rough area noted by dermatology  Severity: mild  Duration: unknown  Timing: constant  Modifying Factors: none  Associated Signs & Symptoms: no bleeding    No prior skin cancer history.    Review of Systems:  Review of Systems   Constitutional: Negative for chills, fatigue, fever and unexpected weight change.   HENT: Negative for facial swelling.    Respiratory: Negative for cough, chest tightness and shortness of breath.    Cardiovascular: Negative for chest pain.   Musculoskeletal: Negative for neck pain.   Skin: Negative for color change.   Neurological: Negative for facial asymmetry.   Hematological: Negative for adenopathy. Does not bruise/bleed easily.       Past History:  Past Medical History:   Diagnosis Date   • Abnormal EKG 3/13/2019   • GERD (gastroesophageal reflux disease)    • Herpes simplex    • Hyperlipidemia    • Hypertension    • Left bundle branch block 3/13/2019     Past Surgical History:   Procedure Laterality Date   • APPENDECTOMY  08/2008   • COLONOSCOPY N/A 5/23/2019    Procedure: COLONOSCOPY WITH ANESTHESIA;  Surgeon: Anni Smith MD;  Location: [facility] ENDOSCOPY;  Service: Gastroenterology   • HERNIA REPAIR      Done at same time as appendectomy   • HYSTERECTOMY      pt reports laparoscopic hysterectomy, BSO and a bladder suspension, hyst done for prolapse   • LASIK  2005     Family History   Problem Relation Age of Onset   • Cancer Mother    • Breast cancer Mother    • Heart disease Father    • Heart attack Father 62   •  Hypertension Brother    • Cancer Maternal Aunt    • Cancer Maternal Grandmother    • Colon polyps Neg Hx    • Colon cancer Neg Hx      Social History     Tobacco Use   • Smoking status: Never Smoker   • Smokeless tobacco: Never Used   Vaping Use   • Vaping Use: Never used   Substance Use Topics   • Alcohol use: Never   • Drug use: Never     Allergies:  Amoxicillin, Clavulanic acid, Erythromycin, and Penicillins    Current Outpatient Medications:   •  acyclovir (ZOVIRAX) 5 % cream, Apply  topically to the appropriate area as directed 4 (Four) Times a Day., Disp: 5 g, Rfl: 3  •  aspirin 81 MG chewable tablet, Chew 81 mg Daily., Disp: , Rfl:   •  cephalexin (KEFLEX) 250 MG capsule, Take 1 capsule by mouth Daily., Disp: 30 capsule, Rfl: 2  •  Cholecalciferol (Vitamin D3) 250 MCG (14833 UT) tablet, Take 1,000 Units by mouth Daily., Disp: , Rfl:   •  cimetidine (TAGAMET) 200 MG tablet, Take 200 mg by mouth Daily As Needed., Disp: , Rfl:   •  conjugated estrogens (Premarin) 0.625 MG/GM vaginal cream, Insert  into the vagina Daily., Disp: 4 g, Rfl: 1  •  fenofibrate (Tricor) 145 MG tablet, Take 1 tablet by mouth Daily., Disp: 90 tablet, Rfl: 0  •  fluticasone (FLONASE) 50 MCG/ACT nasal spray, 2 sprays into the nostril(s) as directed by provider Daily As Needed for Rhinitis or Allergies., Disp: , Rfl:   •  hydroCHLOROthiazide (HYDRODIURIL) 25 MG tablet, Take 1 tablet by mouth Daily., Disp: 90 tablet, Rfl: 1  •  loratadine (CLARITIN) 10 MG tablet, Take 1 tablet by mouth Daily., Disp: 90 tablet, Rfl: 2  •  losartan-hydrochlorothiazide (Hyzaar) 100-25 MG per tablet, Take 1 tablet by mouth Daily. Delete existing refills., Disp: 90 tablet, Rfl: 1  •  lovastatin (MEVACOR) 40 MG tablet, Take 1 tablet by mouth Every Night. Delete existing refills., Disp: 90 tablet, Rfl: 1  •  meloxicam (MOBIC) 15 MG tablet, , Disp: , Rfl:   •  metoprolol succinate XL (Toprol XL) 100 MG 24 hr tablet, Take 1 tablet by mouth Daily. Delete existing  refills., Disp: 90 tablet, Rfl: 1  •  Multiple Vitamins-Minerals (MULTIVITAMIN ADULT PO), Take 1 tablet by mouth Daily., Disp: , Rfl:   •  multivitamin (THERAGRAN) tablet tablet, Take 1 tablet by mouth Daily., Disp: , Rfl:   •  Omega-3 Fatty Acids (fish oil) 1200 MG capsule capsule, Take 1 tablet by mouth Daily., Disp: , Rfl:   •  solifenacin (VESIcare) 5 MG tablet, Take 1 tablet by mouth Daily., Disp: 90 tablet, Rfl: 1  •  valACYclovir (Valtrex) 1000 MG tablet, Use 2 tablets twice a day x1 day with outbreaks, Disp: 30 tablet, Rfl: 2  •  vitamin C (ASCORBIC ACID) 500 MG tablet, Take 1,000 mg by mouth Daily., Disp: , Rfl:     Objective     Vital Signs:  Temp:  [97.4 °F (36.3 °C)] 97.4 °F (36.3 °C)  Heart Rate:  [70] 70  BP: (154)/(78) 154/78    Physical Exam:  Physical Exam  Vitals and nursing note reviewed.   Constitutional:       General: She is not in acute distress.     Appearance: She is well-developed. She is not diaphoretic.   HENT:      Head: Normocephalic and atraumatic.        Right Ear: External ear normal.      Left Ear: External ear normal.      Nose: Nose normal.   Eyes:      General: No scleral icterus.        Right eye: No discharge.         Left eye: No discharge.      Conjunctiva/sclera: Conjunctivae normal.      Pupils: Pupils are equal, round, and reactive to light.   Neck:      Thyroid: No thyromegaly.      Vascular: No JVD.      Trachea: No tracheal deviation.   Pulmonary:      Effort: Pulmonary effort is normal.      Breath sounds: No stridor.   Musculoskeletal:         General: No deformity. Normal range of motion.      Cervical back: Normal range of motion and neck supple.   Lymphadenopathy:      Cervical: No cervical adenopathy.   Skin:     General: Skin is warm and dry.      Coloration: Skin is not pale.      Findings: No erythema or rash.   Neurological:      Mental Status: She is alert and oriented to person, place, and time.      Cranial Nerves: No cranial nerve deficit.       Coordination: Coordination normal.   Psychiatric:         Speech: Speech normal.         Behavior: Behavior normal. Behavior is cooperative.         Thought Content: Thought content normal.         Judgment: Judgment normal.         Data Review:  I have personally reviewed the pathology report demonstrating a nodular and infiltrating basal cell carcinoma of the left central forehead:      Operative plan:    H-Flap    Assessment   1. Basal cell carcinoma (BCC) of left forehead        Plan     I have offered excision of the basal cell carcinoma of the left forehead with  frozen section analysis and likely  bilateral advancement flap closure closure in the office under local anesthesia.    Discussion of skin lesion. Discussed risks, benefits, alternatives, and possible complications of excision of the skin lesion with reconstruction utilizing local tissue rearrangement, full-thickness skin grafting, or local interpolated flaps. Risks include, but are not limited too: bleeding, infection, hematoma, recurrence, need for additional procedures, flap failure, cosmetic deformity. Patient understands risks and would like to proceed with surgery.     My findings and recommendations were discussed and questions were answered.     Familia Ridley MD  05/09/22  13:35 CDT

## 2022-05-16 ENCOUNTER — TRANSCRIBE ORDERS (OUTPATIENT)
Dept: ADMINISTRATIVE | Facility: HOSPITAL | Age: 67
End: 2022-05-16

## 2022-05-16 DIAGNOSIS — Z12.31 ENCOUNTER FOR SCREENING MAMMOGRAM FOR MALIGNANT NEOPLASM OF BREAST: Primary | ICD-10-CM

## 2022-06-01 DIAGNOSIS — E78.2 MIXED HYPERLIPIDEMIA: ICD-10-CM

## 2022-06-01 RX ORDER — FENOFIBRATE 145 MG/1
TABLET, COATED ORAL
Qty: 90 TABLET | Refills: 0 | Status: SHIPPED | OUTPATIENT
Start: 2022-06-01 | End: 2022-08-24 | Stop reason: SDUPTHER

## 2022-06-06 ENCOUNTER — OFFICE VISIT (OUTPATIENT)
Dept: FAMILY MEDICINE CLINIC | Facility: CLINIC | Age: 67
End: 2022-06-06

## 2022-06-06 VITALS
HEART RATE: 57 BPM | SYSTOLIC BLOOD PRESSURE: 132 MMHG | RESPIRATION RATE: 18 BRPM | TEMPERATURE: 97.3 F | WEIGHT: 190 LBS | DIASTOLIC BLOOD PRESSURE: 74 MMHG | HEIGHT: 68 IN | BODY MASS INDEX: 28.79 KG/M2

## 2022-06-06 DIAGNOSIS — Z00.00 MEDICARE ANNUAL WELLNESS VISIT, SUBSEQUENT: Primary | ICD-10-CM

## 2022-06-06 DIAGNOSIS — Z23 IMMUNIZATION DUE: ICD-10-CM

## 2022-06-06 PROBLEM — D22.5 MELANOCYTIC NEVI OF TRUNK: Status: ACTIVE | Noted: 2019-02-04

## 2022-06-06 PROBLEM — L82.0 INFLAMED SEBORRHEIC KERATOSIS: Status: ACTIVE | Noted: 2021-04-01

## 2022-06-06 PROBLEM — I10 HYPERTENSION: Status: ACTIVE | Noted: 2017-02-28

## 2022-06-06 PROBLEM — U07.1 COVID-19: Status: ACTIVE | Noted: 2021-09-23

## 2022-06-06 PROBLEM — L85.3 XEROSIS CUTIS: Status: ACTIVE | Noted: 2021-04-01

## 2022-06-06 PROBLEM — L40.9 PSORIASIS: Status: ACTIVE | Noted: 2017-02-28

## 2022-06-06 PROBLEM — L57.8 OTHER SKIN CHANGES DUE TO CHRONIC EXPOSURE TO NONIONIZING RADIATION: Status: ACTIVE | Noted: 2017-02-27

## 2022-06-06 PROBLEM — D48.5 NEOPLASM OF UNCERTAIN BEHAVIOR OF SKIN: Status: ACTIVE | Noted: 2019-02-04

## 2022-06-06 PROBLEM — R03.0 FINDING OF ABOVE NORMAL BLOOD PRESSURE: Status: ACTIVE | Noted: 2021-04-06

## 2022-06-06 PROCEDURE — 1126F AMNT PAIN NOTED NONE PRSNT: CPT | Performed by: NURSE PRACTITIONER

## 2022-06-06 PROCEDURE — 1159F MED LIST DOCD IN RCRD: CPT | Performed by: NURSE PRACTITIONER

## 2022-06-06 PROCEDURE — 1170F FXNL STATUS ASSESSED: CPT | Performed by: NURSE PRACTITIONER

## 2022-06-06 PROCEDURE — G0439 PPPS, SUBSEQ VISIT: HCPCS | Performed by: NURSE PRACTITIONER

## 2022-06-06 RX ORDER — CLINDAMYCIN HYDROCHLORIDE 150 MG/1
CAPSULE ORAL
COMMUNITY
Start: 2022-05-13 | End: 2023-03-02

## 2022-06-06 NOTE — PROGRESS NOTES
The ABCs of the Annual Wellness Visit  Subsequent Medicare Wellness Visit    Chief Complaint   Patient presents with   • Medicare Wellness-subsequent      Subjective    History of Present Illness:  Ave Singh is a 66 y.o. female who presents for a Subsequent Medicare Wellness Visit.    The following portions of the patient's history were reviewed and   updated as appropriate: allergies, current medications, past family history, past medical history, past social history, past surgical history and problem list.    Compared to one year ago, the patient feels her physical health is better.    Compared to one year ago, the patient feels her mental health is better.    Recent Hospitalizations:  She was not admitted to the hospital during the last year.       Current Medical Providers:  Patient Care Team:  Yolanda Grant DNP, APRN as PCP - General (Family Medicine)  Aggie Walton MD as Consulting Physician (Ophthalmology)  Melina Stein APRN as Nurse Practitioner (Cardiology)  Familia Ridley MD as Consulting Physician (Otolaryngology)  Kendra Zhong APRN as Nurse Practitioner (Nurse Practitioner)  Yolanda Grant DNP, APRN as Referring Physician (Family Medicine)  Phuong Tamayo PA as Physician Assistant (Physician Assistant)    Outpatient Medications Prior to Visit   Medication Sig Dispense Refill   • acyclovir (ZOVIRAX) 5 % cream Apply  topically to the appropriate area as directed 4 (Four) Times a Day. 5 g 3   • aspirin 81 MG chewable tablet Chew 81 mg Daily.     • Cholecalciferol (Vitamin D3) 250 MCG (17300 UT) tablet Take 1,000 Units by mouth Daily.     • cimetidine (TAGAMET) 200 MG tablet Take 200 mg by mouth Daily As Needed.     • clindamycin (CLEOCIN) 150 MG capsule      • conjugated estrogens (Premarin) 0.625 MG/GM vaginal cream Insert  into the vagina Daily. 4 g 1   • fenofibrate (TRICOR) 145 MG tablet TAKE 1 TABLET DAILY 90 tablet 0   • fluticasone (FLONASE) 50 MCG/ACT nasal  spray 2 sprays into the nostril(s) as directed by provider Daily As Needed for Rhinitis or Allergies.     • hydroCHLOROthiazide (HYDRODIURIL) 25 MG tablet Take 1 tablet by mouth Daily. 90 tablet 1   • loratadine (CLARITIN) 10 MG tablet Take 1 tablet by mouth Daily. 90 tablet 2   • losartan-hydrochlorothiazide (Hyzaar) 100-25 MG per tablet Take 1 tablet by mouth Daily. Delete existing refills. 90 tablet 1   • lovastatin (MEVACOR) 40 MG tablet Take 1 tablet by mouth Every Night. Delete existing refills. 90 tablet 1   • meloxicam (MOBIC) 15 MG tablet      • metoprolol succinate XL (Toprol XL) 100 MG 24 hr tablet Take 1 tablet by mouth Daily. Delete existing refills. 90 tablet 1   • Multiple Vitamins-Minerals (MULTIVITAMIN ADULT PO) Take 1 tablet by mouth Daily.     • Omega-3 Fatty Acids (fish oil) 1200 MG capsule capsule Take 1 tablet by mouth Daily.     • solifenacin (VESIcare) 5 MG tablet Take 1 tablet by mouth Daily. 90 tablet 1   • valACYclovir (Valtrex) 1000 MG tablet Use 2 tablets twice a day x1 day with outbreaks 30 tablet 2   • vitamin C (ASCORBIC ACID) 500 MG tablet Take 1,000 mg by mouth Daily.     • cephalexin (KEFLEX) 250 MG capsule Take 1 capsule by mouth Daily. 30 capsule 2   • multivitamin (THERAGRAN) tablet tablet Take 1 tablet by mouth Daily.       No facility-administered medications prior to visit.     No opioid medication identified on active medication list. I have reviewed chart for other potential  high risk medication/s and harmful drug interactions in the elderly.    Aspirin is on active medication list. Aspirin use is indicated based on review of current medical condition/s. Pros and cons of this therapy have been discussed today. Benefits of this medication outweigh potential harm.  Patient has been encouraged to continue taking this medication.        Patient Active Problem List   Diagnosis   • Hypertension   • Hyperlipidemia   • Herpes simplex   • Plantar fasciitis, bilateral   •  "Osteoporosis   • Plantar fasciitis   • Vitamin D deficiency   • Benign neoplasm of ascending colon   • Snoring   • Other fatigue   • Abnormal EKG   • Shortness of breath   • Palpitations   • Positive colorectal cancer screening using Cologuard test   • Hx of colonic polyps   • Prediabetes   • Abnormal results of liver function studies   • Osteopenia   • Benign neoplasm of colon   • Herpes simplex   • Mixed hyperlipidemia   • Mallet toe   • Calcaneal spur   • Contracture of Achilles tendon   • Foot pain   • Hammer toe   • Abnormal liver function tests   • Benign neoplasm of ascending colon   • Mixed hyperlipidemia   • Plantar fasciitis   • COVID-19   • Finding of above normal blood pressure   • Inflamed seborrheic keratosis   • Other skin changes due to chronic exposure to nonionizing radiation   • Melanocytic nevi of trunk   • Neoplasm of uncertain behavior of skin   • Psoriasis   • Xerosis cutis   • Hypertension     Advance Care Planning  Advance Directive is not on file.  ACP discussion was held with the patient during this visit. Patient has an advance directive (not in EMR), copy requested.    Objective    Vitals:    06/06/22 1311   BP: 132/74   BP Location: Right arm   Patient Position: Sitting   Cuff Size: Adult   Pulse: 57   Resp: 18   Temp: 97.3 °F (36.3 °C)   TempSrc: Infrared   Weight: 86.2 kg (190 lb)   Height: 172.7 cm (68\")   PainSc: 0-No pain     Body mass index is 28.89 kg/m².  BMI is >= 25 and < 30. (Overweight) The following options were offered after discussion: exercise counseling/recommendations and nutrition counseling/recommendations.    Does the patient have evidence of cognitive impairment? No    Physical Exam  Lab Results   Component Value Date    CHLPL 155 03/11/2022    TRIG 180 (H) 03/11/2022    HDL 35 (L) 03/11/2022    LDL 89 03/11/2022    VLDL 31 03/11/2022       HEALTH RISK ASSESSMENT    Smoking Status:  Social History     Tobacco Use   Smoking Status Never Smoker   Smokeless Tobacco " Never Used     Alcohol Consumption:  Social History     Substance and Sexual Activity   Alcohol Use Never     Fall Risk Screen:  MADONNAADI Fall Risk Assessment was completed, and patient is at LOW risk for falls.Assessment completed on:6/6/2022    Depression Screening:  PHQ-2/PHQ-9 Depression Screening 6/6/2022   Retired PHQ-9 Total Score -   Retired Total Score -   Little Interest or Pleasure in Doing Things 0-->not at all   Feeling Down, Depressed or Hopeless 0-->not at all   PHQ-9: Brief Depression Severity Measure Score 0     Health Habits and Functional and Cognitive Screening:  Functional & Cognitive Status 6/6/2022   Do you have difficulty preparing food and eating? No   Do you have difficulty bathing yourself, getting dressed or grooming yourself? No   Do you have difficulty using the toilet? No   Do you have difficulty moving around from place to place? No   Do you have trouble with steps or getting out of a bed or a chair? No   Current Diet Well Balanced Diet   Dental Exam Up to date   Eye Exam Up to date   Exercise (times per week) 0 times per week   Current Exercises Include Yard Work   Current Exercise Activities Include -   Do you need help using the phone?  No   Are you deaf or do you have serious difficulty hearing?  No   Do you need help with transportation? No   Do you need help shopping? No   Do you need help preparing meals?  No   Do you need help with housework?  No   Do you need help with laundry? No   Do you need help taking your medications? No   Do you need help managing money? No   Do you ever drive or ride in a car without wearing a seat belt? No   Have you felt unusual stress, anger or loneliness in the last month? No   Who do you live with? Spouse   If you need help, do you have trouble finding someone available to you? No   Have you been bothered in the last four weeks by sexual problems? No   Do you have difficulty concentrating, remembering or making decisions? No     Age-appropriate  Screening Schedule:  Refer to the list below for future screening recommendations based on patient's age, sex and/or medical conditions. Orders for these recommended tests are listed in the plan section. The patient has been provided with a written plan.    Health Maintenance   Topic Date Due   • DXA SCAN  06/11/2022   • INFLUENZA VACCINE  08/01/2022   • TDAP/TD VACCINES (2 - Td or Tdap) 09/17/2022   • LIPID PANEL  03/11/2023   • MAMMOGRAM  06/14/2023   • ZOSTER VACCINE  Completed   • PAP SMEAR  Discontinued        Assessment & Plan    Diagnoses and all orders for this visit:    1. Medicare annual wellness visit, subsequent (Primary)    2. Immunization due      Pt wants to do the pneumococcal 20, we will get it when we get it in our office      Health Maintenance Summary    Postponed - COVID-19 Vaccine:  (3 - Booster for Pfizer series); Postponed until 6/8/2022 06/06/2022  Postponed until 6/8/2022 by Cecile Fisher MA (Patient Refused)    11/23/2021  Postponed until 1/3/2022 by Leila Plata MA (Pending event)    04/09/2021  Imm Admin: COVID-19 (PFIZER) PURPLE CAP    03/19/2021  Imm Admin: COVID-19 (PFIZER) PURPLE CAP      Postponed - Pneumococcal Vaccine 65+: (2 - PCV); Postponed until 7/19/2022 06/06/2022  Postponed until 7/19/2022 by Yolanda Grant, DNP, APRN (Pending event)    05/27/2020  Imm Admin: Pneumococcal Polysaccharide (PPSV23)      DXA SCAN: (Every 2 Years); Next due on 6/11/2022 06/11/2020  DEXA Bone Density Axial    12/04/2013  Done      INFLUENZA VACCINE; (Yearly - August to March); Next due on 8/1/2022  11/10/2021  Imm Admin: Fluzone High Dose =>65 Years ([employer] ONLY)    10/15/2020  Imm Admin: Fluzone High Dose =>65 Years ([employer] ONLY)    10/15/2020  Imm Admin: Flublock Quad =>18yrs    10/15/2020  Imm Admin: Fluzone Split Quad (Multi-dose)    10/15/2020  Imm Admin: Flublok 18+yrs         TDAP/TD VACCINES; (2 - Td or Tdap); Next due on 9/17/2022 09/17/2012  Imm Admin: Tdap      LIPID  PANEL; (Yearly); Next due on 3/11/2023  03/11/2022  Lipid panel    12/23/2021  Lipid panel    09/17/2021  Lipid panel    03/12/2021  Lipid panel    05/27/2020  Lipid panel         ANNUAL WELLNESS VISIT; (Yearly); Next due on 6/6/2023 06/06/2022  Done    05/28/2021  Done    05/28/2021  Level of Service: [place] PPPS, SUBSEQ VISIT    05/27/2020  Done    05/27/2020  Level of Service: WI INITIAL PREVENTIVE EXAM      Scheduled - MAMMOGRAM; (Every 2 Years); Scheduled for 6/16/2022 06/14/2021  Mammo Screening Digital Tomosynthesis Bilateral With CAD    03/18/2020  Mammo Screening Digital Tomosynthesis Bilateral With CAD    02/25/2019  Mammo Screening Digital Tomosynthesis Bilateral With CAD    02/05/2018  Mammo Screening Bilateral With CAD    02/02/2017  Done         COLORECTAL CANCER SCREENING; (COLONOSCOPY - Every 10 Years); Next due on 5/23/2029 05/23/2019  Surgical Procedure: COLONOSCOPY    05/23/2019  COLONOSCOPY    05/16/2019  Prob Dx: Positive colorectal cancer screening using Cologuard test    05/16/2019  Visit Dx: Positive colorectal cancer screening using Cologuard test      HEPATITIS C SCREENING; Addressed  04/16/2019  Declined      ZOSTER VACCINE; (Series Information); Completed  11/10/2021  Imm Admin: Shingrix    08/11/2021  Imm Admin: Shingrix    05/28/2021  Postponed until 5/28/2022 by Cecile Fisher MA (Patient Refused)    04/16/2019  Postponed until 4/16/2019 by Yolanda Grant, BIBIANA, APRN (Patient Refused)      Discontinued - PAP SMEAR; Discontinued  05/27/2020  Frequency changed to Never by Brian Olson APRN (declining per patient's gynecologists recommendations unless issues develop)        CMS Preventative Services Quick Reference  Risk Factors Identified During Encounter  Alcohol Misuse: denies  Dementia/Memory: screened, intact  Depression/Dysphoria: screened  Drug Use/Abuse Identified or Suspected; denies  Fall Risk-High or Moderate; not at risk for all  Hearing Problem: denies,  screened  Obesity/Overweight> screened overweight BMI 28.9 try to lose at least 3 pounds before next visit   Polypharmacy: screened    The above risks/problems have been discussed with the patient.  Follow up actions/plans if indicated are seen below in the Assessment/Plan Section.  Pertinent information has been shared with the patient in the After Visit Summary.      Follow Up:   Return in about 1 year (around 6/6/2023) for Medicare Wellness.     An After Visit Summary and PPPS were made available to the patient.                 Electronically signed by Yolanda Grant, BIBIANA, APRN, 06/06/22, 3:30 PM CDT.

## 2022-06-06 NOTE — PATIENT INSTRUCTIONS
Advance Care Planning and Advance Directives     You make decisions on a daily basis - decisions about where you want to live, your career, your home, your life. Perhaps one of the most important decisions you face is your choice for future medical care. Take time to talk with your family and your healthcare team and start planning today.  Advance Care Planning is a process that can help you:  · Understand possible future healthcare decisions in light of your own experiences  · Reflect on those decision in light of your goals and values  · Discuss your decisions with those closest to you and the healthcare professionals that care for you  · Make a plan by creating a document that reflects your wishes    Surrogate Decision Maker  In the event of a medical emergency, which has left you unable to communicate or to make your own decisions, you would need someone to make decisions for you.  It is important to discuss your preferences for medical treatment with this person while you are in good health.     Qualities of a surrogate decision maker:  • Willing to take on this role and responsibility  • Knows what you want for future medical care  • Willing to follow your wishes even if they don't agree with them  • Able to make difficult medical decisions under stressful circumstances    Advance Directives  These are legal documents you can create that will guide your healthcare team and decision maker(s) when needed. These documents can be stored in the electronic medical record.    · Living Will - a legal document to guide your care if you have a terminal condition or a serious illness and are unable to communicate. States vary by statute in document names/types, but most forms may include one or more of the following:        -  Directions regarding life-prolonging treatments        -  Directions regarding artificially provided nutrition/hydration        -  Choosing a healthcare decision maker        -  Direction  regarding organ/tissue donation    · Durable Power of  for Healthcare - this document names an -in-fact to make medical decisions for you, but it may also allow this person to make personal and financial decisions for you. Please seek the advice of an  if you need this type of document.    **Advance Directives are not required and no one may discriminate against you if you do not sign one.    Medical Orders  Many states allow specific forms/orders signed by your physician to record your wishes for medical treatment in your current state of health. This form, signed in personal communication with your physician, addresses resuscitation and other medical interventions that you may or may not want.      For more information or to schedule a time with a [facility] Advance Care Planning Facilitator contact: [facility] or call [phone] and someone will contact you directly.    Medicare Wellness  Personal Prevention Plan of Service     Date of Office Visit:    Encounter Provider:  Yolanda Grant, BIBIANA, APRN  Place of Service:  [facility]  Patient Name: Ave Singh  :  1955    As part of the Medicare Wellness portion of your visit today, we are providing you with this personalized preventive plan of services (PPPS). This plan is based upon recommendations of the [place] Preventive Services Task Force (USPSTF) and the Advisory Committee on Immunization Practices (ACIP).    This lists the preventive care services that should be considered, and provides dates of when you are due. Items listed as completed are up-to-date and do not require any further intervention.    Health Maintenance   Topic Date Due   • Pneumococcal Vaccine 65+ (2 - PCV) 2021   • COVID-19 Vaccine (3 - Booster for Pfizer series) 2022 (Originally 2021)   • DXA SCAN  2022   • INFLUENZA VACCINE  2022   • TDAP/TD VACCINES (2 - Td or  Tdap) 09/17/2022   • LIPID PANEL  03/11/2023   • ANNUAL WELLNESS VISIT  06/06/2023   • MAMMOGRAM  06/14/2023   • COLORECTAL CANCER SCREENING  05/23/2029   • ZOSTER VACCINE  Completed   • HEPATITIS C SCREENING  Addressed   • PAP SMEAR  Discontinued       No orders of the defined types were placed in this encounter.      Return in about 1 year (around 6/6/2023) for Medicare Wellness.

## 2022-06-13 DIAGNOSIS — Z01.818 PREOP TESTING: Primary | ICD-10-CM

## 2022-06-16 ENCOUNTER — LAB (OUTPATIENT)
Dept: LAB | Facility: HOSPITAL | Age: 67
End: 2022-06-16

## 2022-06-16 ENCOUNTER — HOSPITAL ENCOUNTER (OUTPATIENT)
Dept: MAMMOGRAPHY | Facility: HOSPITAL | Age: 67
Discharge: HOME | End: 2022-06-16
Admitting: NURSE PRACTITIONER

## 2022-06-16 DIAGNOSIS — Z01.818 PREOP TESTING: ICD-10-CM

## 2022-06-16 DIAGNOSIS — Z12.31 ENCOUNTER FOR SCREENING MAMMOGRAM FOR MALIGNANT NEOPLASM OF BREAST: ICD-10-CM

## 2022-06-16 LAB — SARS-COV-2 ORF1AB RESP QL NAA+PROBE: NOT DETECTED

## 2022-06-16 PROCEDURE — U0005 INFEC AGEN DETEC AMPLI PROBE: HCPCS

## 2022-06-16 PROCEDURE — 77067 SCR MAMMO BI INCL CAD: CPT

## 2022-06-16 PROCEDURE — U0004 COV-19 TEST NON-CDC HGH THRU: HCPCS

## 2022-06-16 PROCEDURE — C9803 HOPD COVID-19 SPEC COLLECT: HCPCS

## 2022-06-16 PROCEDURE — 77063 BREAST TOMOSYNTHESIS BI: CPT

## 2022-06-20 ENCOUNTER — PROCEDURE VISIT (OUTPATIENT)
Dept: OTOLARYNGOLOGY | Facility: CLINIC | Age: 67
End: 2022-06-20

## 2022-06-20 VITALS
DIASTOLIC BLOOD PRESSURE: 76 MMHG | TEMPERATURE: 98 F | BODY MASS INDEX: 29.55 KG/M2 | WEIGHT: 195 LBS | RESPIRATION RATE: 20 BRPM | HEART RATE: 65 BPM | HEIGHT: 68 IN | SYSTOLIC BLOOD PRESSURE: 132 MMHG

## 2022-06-20 DIAGNOSIS — C44.319 BASAL CELL CARCINOMA (BCC) OF LEFT FOREHEAD: Primary | ICD-10-CM

## 2022-06-20 PROCEDURE — 14040 TIS TRNFR F/C/C/M/N/A/G/H/F: CPT | Performed by: OTOLARYNGOLOGY

## 2022-06-20 PROCEDURE — 88331 PATH CONSLTJ SURG 1 BLK 1SPC: CPT | Performed by: OTOLARYNGOLOGY

## 2022-06-20 PROCEDURE — 88305 TISSUE EXAM BY PATHOLOGIST: CPT | Performed by: OTOLARYNGOLOGY

## 2022-06-20 NOTE — PROGRESS NOTES
PATIENT NAME: Ave Singh    DATE:  06/20/22    PREOPERATIVE DIAGNOSIS: Basal cell carcinoma skin of the left forehead    POSTOPERATIVE DIAGNOSIS: Basal cell carcinoma skin of the left forehead    PROCEDURE:  1) excision of malignant neoplasm of skin of the left forehead, 1.3 cm x 1.5 cm   2) local tissue rearrangement (bilateral advancing flap ) skin of forehead, 1.5 cm x 4.5 cm    SURGEON:  Familia Ridley MD, FACS    FACILITY: [facility] Office Procedure Room    ANESTHESIA:  Local with 2 cc 1% lidocaine and 1:100,000 epinephrine    DICTATED BY:  Familia Ridley MD, FACS    IVF: None    IMPLANTS: None    DRAINS: None    SPECIMENS: Basal cell carcinoma of the left forehead, stitch at 12:00 as frozen    EBL: 30 cc    COMPLICATIONS: None apparent    INDICATIONS FOR SURGERY: Ms. Sommer presented with a biopsy-proven basal cell carcinoma of the left forehead.  She opted for surgical excision and flap reconstruction.    OPERATIVE FINDINGS:     Lesion: 5 mm x 5 mm  Margins: 3 mm, followed by an additional 2 mm  Defect: 1.3 cm x 1.5 cm  Flap and Defect: 1.5 cm x 4.5 cm  Depth: Through dermis    OPERATIVE DETAILS:     After patient verification consent material was reviewed, the patient was taken to the procedure room and laid supine on the procedure table.  The skin at the area was cleansed with alcohol, the area marked, the patient was then handed a mirror where we reviewed the intended excision, and verified the consent.  This served as the formal timeout procedure.      With the patient in the supine position, the lesion was measured, appropriate margins (as listed above) were drawn, and this was converted into a bilateral advancement flap design.  The skin was  infiltrated with 1% lidocaine with 1-100,000 epinephrine.    After approximately 15 minutes, the skin was tested for anesthesia and deemed appropriate.  The skin was cleansed with Hibiclense and the patient was draped with sterile  towels.    The skin surrounding the lesion that was previously marked and the flap design was incised utilizing a fresh 15 blade.  This was undermined in the immediate subcutaneous plane.  I placed a stitch at 12:00, and sent this for frozen section pathology.  Hemostasis was obtained with bipolar cautery set at 15.    Frozen section analysis demonstrated clear focal tumor abutment to the 6:00 margin.    Additional 6:00 margin was taken by making a rectangular incision at the 6:00 aspect measuring approximately 2 mm.  The distal aspect was marked with surgical pen and sent for permanent section pathology.    Utilizing a fresh 15 blade, the flap incisions were then created.  The flap was then undermined in the immediate subcutaneous plane utilizing a 15 blade and curved iris scissors.  Hemostasis was again obtained utilizing bipolar cautery set at 15.    The flaps were advanced, and closed utilizing deep 5-0 undyed Vicryl suture.  The skin was further closed utilizing running, locking 6-0 Prolene.      Antibiotic ointment was placed to the incisions and the flaps.      DISPOSITION:  The procedures were completed without complication and tolerated well.  The patient was released in the company of herself to return home in satisfactory condition.  A follow-up appointment has been scheduled, routine post-op medications prescribed (if required), and post-op instructions were given to the responsible party.           Familia Ridley MD, FACS  Board Certified Facial Plastic and Reconstructive Surgery  Board Certified Otolaryngology -- Head and Neck Surgery    Electronically signed by Familia Ridley MD, 06/20/22, 10:44 AM CDT.

## 2022-06-20 NOTE — PATIENT INSTRUCTIONS
[facility], Post-Procedure Instructions:    Protect the incisions from sunlight. Sunlight to the incisions will cause permanent pigmentation to the incision line and make the incision more noticeable. After the incision has reepithelialized (typically 2-3 weeks after the procedure), you may begin to use sunscreen with an SPF of 15 or greater    For the first week after your procedure, apply a thin coat of Vaseline to the incision 3-4 times daily.  Starting the second week, use a silicone-based wound cream to the incisions to optimize the end result. Apply topically twice daily, or as directed, to help optimize wound healing and decrease redness.  Should the area need to be cleaned, gently apply peroxide on a Q-tip to the area.  Do not clean the area more than once daily with peroxide, as it can delay wound healing.    Avoid getting water on the surgical site for 3 days.  On day 4, you may briefly get the surgical site with clean water, but avoid soapy water to the area for 1 week.      Due to COVID-19, we have decreased the amount of postoperative checks to help prevent added exposure to the virus.  If you have any questions or concerns following her procedure, please give us a call.  We have the ability to schedule a video visit, phone visit, or follow-up visit to address any concerns, while decreasing your exposure to the hospital.  Many of your questions and concerns may be able to be answered over the phone.  Our direct line is [phone].    It is very important to continue routine skin checks with a dermatologist or your PCP every 6-12 months.        CONTACT INFORMATION:  The main office phone number is [phone]. For emergencies after hours and on weekends, this number will convert over to our answering service and the on call provider will answer. Please try to keep non emergent phone calls/ questions to office hours 9am-5pm Monday through Friday.     [employer]  As an alternative, you can  sign up and use the Epic MyChart system for more direct and quicker access for non emergent questions/ problems.  [facility] "[employer] allows you to send messages to your doctor, view your test results, renew your prescriptions, schedule appointments, and more. To sign up, go to [employer] and click on the Sign Up Now link in the New User? box. Enter your "[employer] Activation Code exactly as it appears below along with the last four digits of your Social Security Number and your Date of Birth () to complete the sign-up process. If you do not sign up before the expiration date, you must request a new code.    "[employer] Activation Code: Activation code not generated  Current "[employer] Status: Active    If you have questions, you can email [email or web address] or call [phone] to talk to our "[employer] staff. Remember, "[employer] is NOT to be used for urgent needs. For medical emergencies, dial 911.

## 2022-06-21 LAB
CYTO UR: NORMAL
CYTO UR: NORMAL
LAB AP CASE REPORT: NORMAL
LAB AP CASE REPORT: NORMAL
LAB AP CLINICAL INFORMATION: NORMAL
LAB AP CLINICAL INFORMATION: NORMAL
Lab: NORMAL
PATH REPORT.FINAL DX SPEC: NORMAL
PATH REPORT.FINAL DX SPEC: NORMAL
PATH REPORT.GROSS SPEC: NORMAL
PATH REPORT.GROSS SPEC: NORMAL

## 2022-06-28 ENCOUNTER — OFFICE VISIT (OUTPATIENT)
Dept: OTOLARYNGOLOGY | Facility: CLINIC | Age: 67
End: 2022-06-28

## 2022-06-28 DIAGNOSIS — Z48.02 VISIT FOR SUTURE REMOVAL: Primary | ICD-10-CM

## 2022-06-28 PROCEDURE — 99024 POSTOP FOLLOW-UP VISIT: CPT | Performed by: OTOLARYNGOLOGY

## 2022-06-28 NOTE — PROGRESS NOTES
Patient here today for post-op visit one week out from exc bcc of forehead. She is doing well and wound is healing great. Patient was given instructions on wound care after suture were removed. She will follow up in 3-4 month for wound check and will call in between time if she has any questions or concerns.

## 2022-08-02 ENCOUNTER — OFFICE VISIT (OUTPATIENT)
Dept: OBSTETRICS AND GYNECOLOGY | Facility: CLINIC | Age: 67
End: 2022-08-02

## 2022-08-02 VITALS
WEIGHT: 194 LBS | SYSTOLIC BLOOD PRESSURE: 128 MMHG | BODY MASS INDEX: 29.4 KG/M2 | HEIGHT: 68 IN | DIASTOLIC BLOOD PRESSURE: 76 MMHG

## 2022-08-02 DIAGNOSIS — N39.41 URGE INCONTINENCE: Primary | ICD-10-CM

## 2022-08-02 DIAGNOSIS — Z87.440 HISTORY OF UTI: ICD-10-CM

## 2022-08-02 PROCEDURE — 99213 OFFICE O/P EST LOW 20 MIN: CPT | Performed by: OBSTETRICS & GYNECOLOGY

## 2022-08-02 RX ORDER — CEPHALEXIN 250 MG/1
250 CAPSULE ORAL DAILY
Qty: 30 CAPSULE | Refills: 2 | Status: SHIPPED | OUTPATIENT
Start: 2022-08-02 | End: 2022-11-29

## 2022-08-02 RX ORDER — SOLIFENACIN SUCCINATE 5 MG/1
5 TABLET, FILM COATED ORAL DAILY
Qty: 90 TABLET | Refills: 1 | Status: SHIPPED | OUTPATIENT
Start: 2022-08-02 | End: 2023-03-02 | Stop reason: SDUPTHER

## 2022-08-02 NOTE — PROGRESS NOTES
"Subjective   Ave Singh is a 66 y.o. female.     Chief Complaint   Patient presents with   • Urge Incontinence     Patient here for 3 months follow up on urge incontinence. Patient has been taking Vesicare 5 mg and Keflex 250mg daily and states she is doing well with medications. Patient reports meds are managing her symptoms and would like to remain on them.        66-year-old female  2 para 2 status post hysterectomy presents for follow-up on incontinence as well as recurrent urinary tract infections.  At her last visit she was placed on Premarin for vaginal dryness however she felt no improvement.  She is not currently interested in any further management dyspareunia.  She reports she is doing well on Vesicare as well as Keflex.  She reports approximately 80 to 90% improvement will since starting the medication.  She does report she has some dry mouth but that is not bothersome.  She voices no other new complaints or concerns at this time.       Review of Systems   Genitourinary: Negative for urinary incontinence.       Objective   /76   Ht 172.7 cm (68\")   Wt 88 kg (194 lb)   BMI 29.50 kg/m²   No LMP recorded. Patient has had a hysterectomy.  Physical Exam  Vitals and nursing note reviewed.   Constitutional:       General: She is not in acute distress.     Appearance: She is well-developed.   HENT:      Head: Normocephalic and atraumatic.   Eyes:      Conjunctiva/sclera: Conjunctivae normal.   Pulmonary:      Effort: Pulmonary effort is normal.   Musculoskeletal:         General: Normal range of motion.      Cervical back: Normal range of motion and neck supple.   Skin:     General: Skin is warm and dry.   Neurological:      Mental Status: She is alert and oriented to person, place, and time.   Psychiatric:         Behavior: Behavior normal.         Judgment: Judgment normal.       Assessment & Plan   Problems Addressed this Visit    None     Visit Diagnoses     Urge incontinence    -  " Primary    History of UTI        Relevant Medications    cephalexin (KEFLEX) 250 MG capsule      Diagnoses       Codes Comments    Urge incontinence    -  Primary ICD-10-CM: N39.41  ICD-9-CM: 788.31     History of UTI     ICD-10-CM: Z87.440  ICD-9-CM: V13.02       Refill medication sent at this time.  Return to clinic in 6 months for follow-up or sooner symptoms worsen.  All questions answered patient verbalized understanding of plan.       Isabel Kingston, DO

## 2022-08-24 DIAGNOSIS — E78.2 MIXED HYPERLIPIDEMIA: ICD-10-CM

## 2022-08-24 DIAGNOSIS — I10 ESSENTIAL HYPERTENSION: Chronic | ICD-10-CM

## 2022-08-24 RX ORDER — FENOFIBRATE 145 MG/1
145 TABLET, COATED ORAL DAILY
Qty: 90 TABLET | Refills: 0 | Status: SHIPPED | OUTPATIENT
Start: 2022-08-24 | End: 2022-09-12 | Stop reason: SDUPTHER

## 2022-08-24 RX ORDER — LOVASTATIN 40 MG/1
40 TABLET ORAL NIGHTLY
Qty: 90 TABLET | Refills: 1 | Status: SHIPPED | OUTPATIENT
Start: 2022-08-24 | End: 2023-03-10 | Stop reason: SDUPTHER

## 2022-08-24 RX ORDER — HYDROCHLOROTHIAZIDE 25 MG/1
25 TABLET ORAL DAILY
Qty: 90 TABLET | Refills: 1 | Status: SHIPPED | OUTPATIENT
Start: 2022-08-24 | End: 2023-03-10 | Stop reason: SDUPTHER

## 2022-08-24 RX ORDER — METOPROLOL SUCCINATE 100 MG/1
100 TABLET, EXTENDED RELEASE ORAL DAILY
Qty: 90 TABLET | Refills: 1 | Status: SHIPPED | OUTPATIENT
Start: 2022-08-24 | End: 2023-03-10 | Stop reason: SDUPTHER

## 2022-08-24 RX ORDER — LOSARTAN POTASSIUM AND HYDROCHLOROTHIAZIDE 25; 100 MG/1; MG/1
1 TABLET ORAL DAILY
Qty: 90 TABLET | Refills: 1 | Status: SHIPPED | OUTPATIENT
Start: 2022-08-24 | End: 2023-03-10 | Stop reason: SDUPTHER

## 2022-08-24 NOTE — TELEPHONE ENCOUNTER
Rx Refill Note  Requested Prescriptions     Pending Prescriptions Disp Refills   • fenofibrate (TRICOR) 145 MG tablet 90 tablet 0     Sig: Take 1 tablet by mouth Daily.   • hydroCHLOROthiazide (HYDRODIURIL) 25 MG tablet 90 tablet 1     Sig: Take 1 tablet by mouth Daily.   • lovastatin (MEVACOR) 40 MG tablet 90 tablet 1     Sig: Take 1 tablet by mouth Every Night. Delete existing refills.   • metoprolol succinate XL (Toprol XL) 100 MG 24 hr tablet 90 tablet 1     Sig: Take 1 tablet by mouth Daily. Delete existing refills.   • losartan-hydrochlorothiazide (Hyzaar) 100-25 MG per tablet 90 tablet 1     Sig: Take 1 tablet by mouth Daily. Delete existing refills.      Last office visit with prescribing clinician: 6/6/2022      Next office visit with prescribing clinician: 9/12/2022          {TIP  Is Refill Pharmacy correct?:23}  Susie Blancas MA  08/24/22, 12:33 CDT

## 2022-08-24 NOTE — TELEPHONE ENCOUNTER
Caller: Ave Singh    Relationship: Self    Best call back number: [phone]      Requested Prescriptions     Pending Prescriptions Disp Refills   • fenofibrate (TRICOR) 145 MG tablet 90 tablet 0     Sig: Take 1 tablet by mouth Daily.   • hydroCHLOROthiazide (HYDRODIURIL) 25 MG tablet 90 tablet 1     Sig: Take 1 tablet by mouth Daily.   • lovastatin (MEVACOR) 40 MG tablet 90 tablet 1     Sig: Take 1 tablet by mouth Every Night. Delete existing refills.   • metoprolol succinate XL (Toprol XL) 100 MG 24 hr tablet 90 tablet 1     Sig: Take 1 tablet by mouth Daily. Delete existing refills.   • losartan-hydrochlorothiazide (Hyzaar) 100-25 MG per tablet 90 tablet 1     Sig: Take 1 tablet by mouth Daily. Delete existing refills.        Pharmacy where request should be sent:      [facility] [place], [place] - [address] AT [facility] - [phone] [facility] [phone] FX    Additional details provided by patient:     90 DAY SUPPLY    Does the patient have less than a 3 day supply:  [] Yes  [x] No    Goyo Carmichael Rep   08/24/22 08:51 CDT

## 2022-09-12 ENCOUNTER — OFFICE VISIT (OUTPATIENT)
Dept: FAMILY MEDICINE CLINIC | Facility: CLINIC | Age: 67
End: 2022-09-12

## 2022-09-12 VITALS
WEIGHT: 186 LBS | RESPIRATION RATE: 18 BRPM | SYSTOLIC BLOOD PRESSURE: 134 MMHG | DIASTOLIC BLOOD PRESSURE: 81 MMHG | OXYGEN SATURATION: 99 % | HEART RATE: 64 BPM | TEMPERATURE: 97.8 F | HEIGHT: 68 IN | BODY MASS INDEX: 28.19 KG/M2

## 2022-09-12 DIAGNOSIS — I10 PRIMARY HYPERTENSION: Primary | ICD-10-CM

## 2022-09-12 DIAGNOSIS — E78.2 MIXED HYPERLIPIDEMIA: ICD-10-CM

## 2022-09-12 PROBLEM — L85.3 XEROSIS CUTIS: Status: ACTIVE | Noted: 2018-01-03

## 2022-09-12 PROBLEM — L57.8 OTHER SKIN CHANGES DUE TO CHRONIC EXPOSURE TO NONIONIZING RADIATION: Status: ACTIVE | Noted: 2021-04-01

## 2022-09-12 PROBLEM — L81.4 OTHER MELANIN HYPERPIGMENTATION: Status: ACTIVE | Noted: 2022-03-23

## 2022-09-12 PROBLEM — L85.8 OTHER SPECIFIED EPIDERMAL THICKENING: Status: ACTIVE | Noted: 2021-04-01

## 2022-09-12 PROCEDURE — 99213 OFFICE O/P EST LOW 20 MIN: CPT | Performed by: NURSE PRACTITIONER

## 2022-09-12 RX ORDER — FENOFIBRATE 145 MG/1
145 TABLET, COATED ORAL DAILY
Qty: 90 TABLET | Refills: 1 | Status: SHIPPED | OUTPATIENT
Start: 2022-09-12 | End: 2023-03-14 | Stop reason: SDUPTHER

## 2022-09-12 NOTE — PROGRESS NOTES
Chief Complaint  Hypertension (Follow up)    Subjective        Ave Singh presents to [facility]  Here for follow up for bp and hyperlipidemia.  She brings in a log of bps 131/72, 132/69, 123/73, 120/70, 132/70, 126/73, 133/67, 108/62, 124/65, 137/74, 122/67, 113/865, 124/66, 104/68, 121/68, 146/11.  Denies any chest pain, shortness of breath or palpitations.  Says that when she was here last visit I filled all her meds except for the fenofibrate.  No other complaints voiced.  Did discuss bone density, the risks, benefits and alternative options and she declines having one doen.     Hypertension  This is a chronic problem. The current episode started more than 1 year ago. The problem has been gradually improving since onset. The problem is controlled. Pertinent negatives include no blurred vision, chest pain, malaise/fatigue, neck pain, orthopnea, PND or shortness of breath. There are no associated agents to hypertension. Risk factors for coronary artery disease include dyslipidemia, post-menopausal state and sedentary lifestyle. Past treatments include angiotensin blockers, diuretics and ACE inhibitors. Current antihypertension treatment includes diuretics and angiotensin blockers. The current treatment provides mild improvement. There are no compliance problems.  There is no history of kidney disease, heart failure or retinopathy. There is no history of chronic renal disease.   Hyperlipidemia  This is a chronic problem. The current episode started more than 1 year ago. The problem is controlled. Recent lipid tests were reviewed and are high. She has no history of chronic renal disease, hypothyroidism or nephrotic syndrome. There are no known factors aggravating her hyperlipidemia. Pertinent negatives include no chest pain, focal weakness, myalgias or shortness of breath. Current antihyperlipidemic treatment includes statins. The current treatment provides mild improvement  "of lipids. There are no compliance problems.  Risk factors for coronary artery disease include post-menopausal, obesity, a sedentary lifestyle, dyslipidemia and hypertension.       Objective   Vital Signs:  /81 (BP Location: Right arm, Patient Position: Sitting, Cuff Size: Large Adult)   Pulse 64   Temp 97.8 °F (36.6 °C) (Infrared)   Resp 18   Ht 172.7 cm (68\")   Wt 84.4 kg (186 lb)   SpO2 99%   BMI 28.28 kg/m²   Estimated body mass index is 28.28 kg/m² as calculated from the following:    Height as of this encounter: 172.7 cm (68\").    Weight as of this encounter: 84.4 kg (186 lb).          Physical Exam  Vitals and nursing note reviewed.   Constitutional:       General: She is awake.      Appearance: Normal appearance. She is well-developed and well-groomed.   HENT:      Head: Normocephalic and atraumatic.      Right Ear: Hearing, tympanic membrane, ear canal and external ear normal.      Left Ear: Hearing, tympanic membrane, ear canal and external ear normal.      Nose: Nose normal.      Mouth/Throat:      Lips: Pink.      Pharynx: Oropharynx is clear.   Eyes:      General: Lids are normal.      Conjunctiva/sclera: Conjunctivae normal.   Cardiovascular:      Rate and Rhythm: Normal rate and regular rhythm.      Heart sounds: Normal heart sounds.   Pulmonary:      Effort: Pulmonary effort is normal.      Breath sounds: Normal breath sounds and air entry.   Musculoskeletal:      Cervical back: Full passive range of motion without pain.      Right lower leg: No edema.      Left lower leg: No edema.   Lymphadenopathy:      Head:      Right side of head: No submental, submandibular or tonsillar adenopathy.      Left side of head: No submental, submandibular or tonsillar adenopathy.   Skin:     General: Skin is warm and dry.   Neurological:      Mental Status: She is alert and oriented to person, place, and time.      Sensory: Sensation is intact.      Motor: Motor function is intact.      Coordination: " Coordination is intact.      Gait: Gait is intact.   Psychiatric:         Attention and Perception: Attention and perception normal.         Mood and Affect: Mood and affect normal.         Speech: Speech normal.         Behavior: Behavior normal. Behavior is cooperative.         Thought Content: Thought content normal.         Cognition and Memory: Cognition and memory normal.         Judgment: Judgment normal.        Result Review :                Assessment and Plan   Diagnoses and all orders for this visit:    1. Primary hypertension (Primary)    2. Mixed hyperlipidemia  -     fenofibrate (TRICOR) 145 MG tablet; Take 1 tablet by mouth Daily.  Dispense: 90 tablet; Refill: 1             Follow Up   Return in about 6 months (around 3/12/2023).  Patient was given instructions and counseling regarding her condition or for health maintenance advice. Please see specific information pulled into the AVS if appropriate.     Electronically signed by Yolanda Grant DNP, APRN, 09/12/22, 11:35 AM CDT.

## 2022-09-15 ENCOUNTER — TELEPHONE (OUTPATIENT)
Dept: FAMILY MEDICINE CLINIC | Facility: CLINIC | Age: 67
End: 2022-09-15

## 2022-09-15 NOTE — TELEPHONE ENCOUNTER
Caller: Ave Singh    Relationship: Self    Best call back number: [phone]    What medication are you requesting: ANTIBIOTIC    What are your current symptoms: BURNING, FREQUENT URINATION     How long have you been experiencing symptoms: 9.5.22    Have you had these symptoms before:    [x] Yes  [] No    Have you been treated for these symptoms before:   [x] Yes  [] No    If a prescription is needed, what is your preferred pharmacy and phone number:    [facility] [place], [place] - [address] [place] [address] [phone] [facility] [phone]   [phone]    Additional notes: PATIENT STATES THAT SHE HAD SOME MEDICATION LEFTOVER FROM LAST TIME SHE HAD THE SAME SYMPTOMS. ABOUT 5 DAYS WORTH. PATIENT RAN OUT AND IS NEEDING SOMETHING FOR THE SYMPTOMS.

## 2022-09-16 DIAGNOSIS — R35.0 URINARY FREQUENCY: Primary | ICD-10-CM

## 2022-09-16 RX ORDER — SULFAMETHOXAZOLE AND TRIMETHOPRIM 800; 160 MG/1; MG/1
1 TABLET ORAL 2 TIMES DAILY
Qty: 14 TABLET | Refills: 0 | Status: SHIPPED | OUTPATIENT
Start: 2022-09-16 | End: 2022-09-23

## 2022-10-19 ENCOUNTER — OFFICE VISIT (OUTPATIENT)
Dept: OTOLARYNGOLOGY | Facility: CLINIC | Age: 67
End: 2022-10-19

## 2022-10-19 VITALS
DIASTOLIC BLOOD PRESSURE: 74 MMHG | HEIGHT: 68 IN | SYSTOLIC BLOOD PRESSURE: 136 MMHG | HEART RATE: 65 BPM | WEIGHT: 198 LBS | BODY MASS INDEX: 30.01 KG/M2 | TEMPERATURE: 98 F | RESPIRATION RATE: 20 BRPM

## 2022-10-19 DIAGNOSIS — L57.0 ACTINIC KERATOSIS: ICD-10-CM

## 2022-10-19 DIAGNOSIS — C44.319 BASAL CELL CARCINOMA (BCC) OF LEFT FOREHEAD: ICD-10-CM

## 2022-10-19 DIAGNOSIS — Z85.828 ENCOUNTER FOR FOLLOW-UP SURVEILLANCE OF SKIN CANCER: Primary | ICD-10-CM

## 2022-10-19 DIAGNOSIS — Z08 ENCOUNTER FOR FOLLOW-UP SURVEILLANCE OF SKIN CANCER: Primary | ICD-10-CM

## 2022-10-19 PROCEDURE — 99213 OFFICE O/P EST LOW 20 MIN: CPT | Performed by: OTOLARYNGOLOGY

## 2022-10-19 NOTE — PROGRESS NOTES
PRIMARY CARE PROVIDER: Yolanda Grant, DNP, APRN  REFERRING PROVIDER: No ref. provider found    Chief Complaint   Patient presents with   • Follow-up     Bcc of forehead follow up       Subjective   History of Present Illness:  Ave Singh is a  66 y.o. female who presents for head neck skin cancer surveillance.  Most recently, on June 20, 2022, she underwent excision of a basal cell carcinoma of the left forehead with bilateral advancement flap reconstruction.    She is very pleased with her reconstruction.  She does report numbness cephalic to the incision.  She also reports a lesion medial to that incision that will become rough on occasion.    Dermatology: Phuong Tamayo @ [facility] -she follows up frequently.    Review of Systems:  Review of Systems   Constitutional: Negative for chills, fatigue, fever and unexpected weight change.   HENT: Negative for facial swelling.    Respiratory: Negative for cough, chest tightness and shortness of breath.    Cardiovascular: Negative for chest pain.   Musculoskeletal: Negative for neck pain.   Skin: Negative for color change.   Neurological: Negative for facial asymmetry.   Hematological: Negative for adenopathy. Does not bruise/bleed easily.       Past History:  Past Medical History:   Diagnosis Date   • Abnormal EKG 3/13/2019   • GERD (gastroesophageal reflux disease)    • Herpes simplex    • Hyperlipidemia    • Hypertension    • Left bundle branch block 3/13/2019     Past Surgical History:   Procedure Laterality Date   • APPENDECTOMY  08/2008   • COLONOSCOPY N/A 05/23/2019    Procedure: COLONOSCOPY WITH ANESTHESIA;  Surgeon: Anni Smith MD;  Location: [facility] ENDOSCOPY;  Service: Gastroenterology   • HERNIA REPAIR      Done at same time as appendectomy   • HYSTERECTOMY      pt reports laparoscopic hysterectomy, BSO and a bladder suspension, hyst done for prolapse   • LASIK  2005   • SKIN CANCER EXCISION      bcc of forehead  06/20/22     Family History    Problem Relation Age of Onset   • Cancer Mother    • Breast cancer Mother    • Heart disease Father    • Heart attack Father 62   • Hypertension Brother    • Cancer Maternal Aunt    • Cancer Maternal Grandmother    • Colon polyps Neg Hx    • Colon cancer Neg Hx      Social History     Tobacco Use   • Smoking status: Never   • Smokeless tobacco: Never   Vaping Use   • Vaping Use: Never used   Substance Use Topics   • Alcohol use: Never   • Drug use: Never     Allergies:  Amoxicillin, Clavulanic acid, Erythromycin, and Penicillins    Current Outpatient Medications:   •  acyclovir (ZOVIRAX) 5 % cream, Apply  topically to the appropriate area as directed 4 (Four) Times a Day., Disp: 5 g, Rfl: 3  •  aspirin 81 MG chewable tablet, Chew 81 mg Daily., Disp: , Rfl:   •  cephalexin (KEFLEX) 250 MG capsule, Take 1 capsule by mouth Daily., Disp: 30 capsule, Rfl: 2  •  Cholecalciferol (Vitamin D3) 250 MCG (78746 UT) tablet, Take 1,000 Units by mouth Daily., Disp: , Rfl:   •  cimetidine (TAGAMET) 200 MG tablet, Take 200 mg by mouth Daily As Needed., Disp: , Rfl:   •  clindamycin (CLEOCIN) 150 MG capsule, , Disp: , Rfl:   •  fenofibrate (TRICOR) 145 MG tablet, Take 1 tablet by mouth Daily., Disp: 90 tablet, Rfl: 1  •  fluticasone (FLONASE) 50 MCG/ACT nasal spray, 2 sprays into the nostril(s) as directed by provider Daily As Needed for Rhinitis or Allergies., Disp: , Rfl:   •  hydroCHLOROthiazide (HYDRODIURIL) 25 MG tablet, Take 1 tablet by mouth Daily., Disp: 90 tablet, Rfl: 1  •  loratadine (CLARITIN) 10 MG tablet, Take 1 tablet by mouth Daily., Disp: 90 tablet, Rfl: 2  •  losartan-hydrochlorothiazide (Hyzaar) 100-25 MG per tablet, Take 1 tablet by mouth Daily. Delete existing refills., Disp: 90 tablet, Rfl: 1  •  lovastatin (MEVACOR) 40 MG tablet, Take 1 tablet by mouth Every Night. Delete existing refills., Disp: 90 tablet, Rfl: 1  •  meloxicam (MOBIC) 15 MG tablet, , Disp: , Rfl:   •  metoprolol succinate XL (Toprol XL) 100  MG 24 hr tablet, Take 1 tablet by mouth Daily. Delete existing refills., Disp: 90 tablet, Rfl: 1  •  Multiple Vitamins-Minerals (MULTIVITAMIN ADULT PO), Take 1 tablet by mouth Daily., Disp: , Rfl:   •  multivitamin (THERAGRAN) tablet tablet, Take 1 tablet by mouth Daily., Disp: , Rfl:   •  Omega-3 Fatty Acids (fish oil) 1200 MG capsule capsule, Take 1 tablet by mouth Daily., Disp: , Rfl:   •  solifenacin (VESIcare) 5 MG tablet, Take 1 tablet by mouth Daily., Disp: 90 tablet, Rfl: 1  •  valACYclovir (Valtrex) 1000 MG tablet, Use 2 tablets twice a day x1 day with outbreaks, Disp: 30 tablet, Rfl: 2  •  vitamin C (ASCORBIC ACID) 500 MG tablet, Take 1,000 mg by mouth Daily., Disp: , Rfl:       Objective     Vital Signs:  Temp:  [98 °F (36.7 °C)] 98 °F (36.7 °C)  Heart Rate:  [65] 65  Resp:  [20] 20  BP: (136)/(74) 136/74    Physical Exam:  Physical Exam  Vitals and nursing note reviewed.   Constitutional:       General: She is not in acute distress.     Appearance: She is well-developed. She is not diaphoretic.   HENT:      Head: Normocephalic and atraumatic.        Right Ear: External ear normal.      Left Ear: External ear normal.      Nose: Nose normal.   Eyes:      General: No scleral icterus.        Right eye: No discharge.         Left eye: No discharge.      Conjunctiva/sclera: Conjunctivae normal.      Pupils: Pupils are equal, round, and reactive to light.   Neck:      Thyroid: No thyromegaly.      Vascular: No JVD.      Trachea: No tracheal deviation.   Pulmonary:      Effort: Pulmonary effort is normal.      Breath sounds: No stridor.   Musculoskeletal:         General: No deformity. Normal range of motion.      Cervical back: Normal range of motion and neck supple.   Lymphadenopathy:      Cervical: No cervical adenopathy.   Skin:     General: Skin is warm and dry.      Coloration: Skin is not pale.      Findings: No erythema or rash.   Neurological:      Mental Status: She is alert and oriented to person,  place, and time.      Cranial Nerves: No cranial nerve deficit.      Coordination: Coordination normal.   Psychiatric:         Speech: Speech normal.         Behavior: Behavior normal. Behavior is cooperative.         Thought Content: Thought content normal.         Judgment: Judgment normal.         Results Review:   I reviewed her pathology report which demonstrated basal cell carcinoma, cleared after the second excision:      Assessment   Assessment:  1. Encounter for follow-up surveillance of skin cancer    2. Basal cell carcinoma (BCC) of left forehead        Plan   Plan:  The forehead is healed well.  There are no new, or suspicious lesions apart from what appears to be an early actinic keratosis of the left paramedian forehead.  I offered cryotherapy today, which she declined.  She follows up at [facility] routinely, and may address this there.    About Sun Protection and Sunscreen from the American Academy of Dermatology:    Seeking shade, wearing protective clothing -- including a lightweight and long-sleeved shirt, pants, a wide-brimmed hat, and sunglasses with UV protection -- and using sunscreen are all important behaviors to reduce your risk of skin cancer. Sunscreen products are regulated as over-the-counter drugs by the U.S. Food and Drug Administration (FDA).    Scientific evidence supports the benefits of using sunscreen to minimize short-term and long-term damage to the skin from the sun’s rays. Claims that sunscreen ingredients are a hazard to human health have not been proven.    Who needs sunscreen?  Everyone. Sunscreen use can help prevent skin cancer by protecting you from the sun’s harmful ultraviolet (UV) rays. Anyone can get skin cancer, regardless of age, gender or race. In fact, it is estimated that one in five Americans will develop skin cancer in their lifetime.1 Sunscreen can also help prevent premature skin aging, such as wrinkles and age spots, caused by too much unprotected UV  exposure. 2,3    What sunscreen should I use?  The American Academy of Dermatology recommends that everyone use sunscreen that offers the following:    Broad-spectrum protection (protects against UVA and UVB rays)    SPF 30 or higher    Water resistance    A sunscreen that offers the above helps to protect your skin from sunburn, early skin aging3 and skin cancer. However, sunscreen alone cannot fully protect you. In addition to wearing sunscreen, dermatologists recommend taking the following steps to protect your skin and detect skin cancer early, when it’s most treatable:    Seek shade when appropriate, remembering that the sun’s rays are strongest between 10 a.m. and 2 p.m. If your shadow is shorter than you are, seek shade.4    Dress to protect yourself from the sun by wearing a lightweight and long-sleeved shirt, pants, a wide-brimmed hat, and sunglasses with UV protection, when possible. For more effective sun protection, select clothing with an ultraviolet protection factor (UPF) number on the label.    Use extra caution near water, snow, and sand as they reflect the damaging rays of the sun, which can increase your chance of sunburn.    Get vitamin D safely through a healthy diet that may include vitamin supplements. Don’t seek the sun.5    Avoid tanning beds. Ultraviolet light from the sun and tanning beds can cause skin cancer and wrinkling.3,6 If you want to look tan, you may wish to use a self-tanning product, but continue to use sunscreen with it.    Check your birthday suit on your birthday. If you notice anything changing, itching or bleeding on your skin, see a board-certified dermatologist. Skin cancer is highly treatable when caught early.    When should I use sunscreen?  Every day if you will be outside. The sun emits harmful UV rays year-round. Even on cloudy days, up to 80% of the sun’s harmful UV rays can penetrate the clouds.7    Be extra mindful about applying sunscreen around snow, sand, and  water, as these surfaces reflect the sun’s rays, increasing your chance of sunburn.7    How much sunscreen should I use, and how often should I apply it?  Most people only apply about 20-50% of the amount of sunscreen needed to achieve the amount of SPF on the label.8    Apply enough sunscreen to cover all exposed skin. Most adults need about 1 ounce -- or enough to fill a shot glass -- to fully cover their entire body.    Don't forget to apply to the tops of your feet, your neck, your ears and the top of your head.    Apply sunscreen to dry skin 15 minutes before going outdoors.    Skin cancer also can form on the lips. To protect your lips, apply a lip balm or lipstick that contains sunscreen with an SPF of 30 or higher.    When outdoors, reapply sunscreen approximately every two hours, or after swimming or sweating, according to the directions on the bottle.    Broad-spectrum sunscreens protect against both UVA and UVB rays. What is the difference between the rays?  Sunlight consists of two types of harmful rays that reach the earth -- UVA rays and UVB rays. Overexposure to either can lead to skin cancer. In addition to causing skin cancer, here’s what each of these rays do:    UVA rays (or aging rays) can prematurely age your skin, causing wrinkles and age spots, and can pass through window glass.    UVB rays (or burning rays) are the primary cause of sunburn and are blocked by window glass.    The [place] Department of Health & Human Services and the World Health Organization’s International Agency of Research on Cancer have declared UV radiation from the sun and artificial sources, such as tanning beds and sun lamps, a known carcinogen (cancer-causing substance).9    There is no safe way to tan. Every time you tan, you damage your skin. As this damage builds, you speed up the aging of your skin and increase your risk for all types of skin cancer.    What is visible light and do I need to protect my skin  against it?  Visible light is defined as any light that the human eye can see -- from nature’s sunlight to artificial light sources from ceiling lights, our phones, computer screens and TVs. Research shows visible light from the sun can cause skin darkening in dark skinned, but not in light skinned individuals.10 To protect against the negative effects of visible light from the sun, use a broad-spectrum sunscreen that says “tinted” on the label and has an SPF of 30 or higher.11    What type of sunscreen should I use?  The best type of sunscreen is the one you will use again and again. Just make sure it offers broad-spectrum (UVA and UVB) protection, has an SPF of 30 or higher, and is water-resistant.    The kind of sunscreen you use is a matter of personal choice and may vary depending on the area of the body to be protected. Available sunscreen options include lotions, creams, gels, ointments, wax sticks, and sprays.    Creams are best for dry skin and for the face.    Gels are good for hairy areas, such as the scalp or male chest.    Sticks are good to use around the eyes.    Sprays are sometimes preferred by parents since they are easy to apply to children.    However, the challenge in using sprays is that it is difficult to know if you have used enough sunscreen to cover all sun-exposed areas of the body, which may result in inadequate coverage. When using spray sunscreen, spray until your or your child’s skin glistens, and rub it in afterwards to ensure even coverage. Do not inhale these products or apply near heat, open flame or while smoking. To avoid inhaling spray sunscreen, never spray it around or near the face or mouth. Spray the sunscreen into your hands and then use your hands to apply it on your face. When applying spray sunscreens on children, be aware of the direction of the wind to avoid inhalation.    Tinted sunscreens, which contain the active ingredients zinc oxide and/or titanium dioxide, can  help reduce the white “residue” that can be left on the skin and can help prevent skin darkening that may occur from exposure to visible light from the sun, particularly in dark-skinned individuals.    Some sunscreen products are also available in combination with moisturizers and cosmetics. However, while these products are convenient, remember that sunscreen needs to be reapplied every two hours when you’re outdoors.    Sunscreen may also be sold in combination with an insect repellent. The AAD recommends purchasing and using these products separately, as sunscreen needs to be applied generously and often, whereas insect repellent should be used sparingly and much less frequently.    In addition, keep in mind that while some sunscreens are water resistant, no sunscreen is “waterproof” or “sweatproof.” Sunscreen manufacturers are banned from using these terms, as they would be misleading. Even after using a water-resistant sunscreen, you should reapply it after swimming or sweating.    Regardless of which sunscreen you choose, be sure to apply it generously to achieve the UV protection indicated on the product label.    What is the difference between chemical and physical sunscreens?  Chemical sunscreens work like a sponge, absorbing the sun’s rays. They contain one or more of the following active ingredients: oxybenzone, avobenzone, octisalate, octocrylene, homosalate, and octinoxate. These formulations tend to be easier to rub into the skin without leaving a white residue.    Physical sunscreens work like a shield, sitting on the surface of your skin and deflecting the sun’s rays. They contain the active ingredients zinc oxide and/or titanium dioxide. Opt for this sunscreen if you have sensitive skin. However, they are more likely to leave a white “residue” on the skin unless you use a sunscreen that says “tinted” on the label.    If you are concerned about certain sunscreen ingredients, you can select a formula  that contains different active ingredients. As long as your sunscreen is broad-spectrum, water-resistant and has an SPF 30 or higher, it can effectively protect you from the sun.    Is a high-number SPF better than a low-number one?  Dermatologists recommend using a sunscreen with an SPF of at least 30, which blocks 97% of the sun's UVB rays. Higher-number SPFs block slightly more of the sun's UVB rays, but no sunscreen can block 100% of the sun's UVB rays.    It is also important to remember that high-number SPFs last the same amount of time as low-number SPFs. A high-number SPF does not allow you to spend additional time outdoors without reapplication. Sunscreens should be reapplied approximately every two hours when outdoors, even on cloudy days, and after swimming or sweating, according to the directions on the bottle.    How can I protect my baby or toddler from the sun?  Ideally, parents should avoid exposing babies younger than 6 months to the sun’s rays.    The best way to protect infants from the sun is to keep them in the shade as much as possible, in addition to dressing them in long sleeves, pants, a wide-brimmed hat, and sunglasses. If you can’t find shade, create your own using an umbrella, canopy, or the farfan of a stroller. Make sure they do not get overheated and that they drink plenty of fluids. If your baby is fussy, crying excessively, or has redness on any exposed skin, take him or her indoors.    Minimize sunscreen use on children younger than six months old. However, if shade and adequate clothing are not available, parents and caretakers may apply a broad-spectrum, water-resistant sunscreen with an SPF of 30 or higher to all skin not covered by clothing, according to the instructions on the product label. When outdoors, sunscreen should be reapplied approximately every two hours, or as often as the label says. Sunscreens that use the ingredients zinc oxide or titanium dioxide, or special  sunscreens made for infants or toddlers, may cause less irritation to their sensitive skin.10    Can I use the sunscreen I bought last summer, or do I need to purchase a new bottle each year? Does it lose its strength?  Dermatologists recommend using sunscreen every day when you are outside, not just during the summer. If you are using sunscreen every day and in the correct amount, a bottle should not last long. If you find a bottle of sunscreen that you have not used for some time, here are some guidelines you can follow:    The FDA requires that all sunscreens retain their original strength for at least three years.    Some sunscreens include an expiration date. If the expiration date has passed, throw out the sunscreen.    If you buy a sunscreen that does not have an expiration date, write the date you bought the sunscreen on the bottle. That way, you’ll know when to throw it out.    You also can look for visible signs that the sunscreen may no longer be good. Any obvious changes in the color or consistency of the product mean it’s time to purchase a new bottle.    Will using sunscreen limit the amount of vitamin D I get?  No, studies show that regular use of sunscreen is unlikely to decrease your skin’s production of vitamin D. Because the amount of vitamin D a person receives from the sun is inconsistent and increases the risk of skin cancer, the AAD recommends getting vitamin D from a healthy diet that includes foods naturally rich in vitamin D, foods and beverages fortified with vitamin D, and/or vitamin D supplements.    If you are concerned that you are not getting enough vitamin D, you should discuss your options for getting vitamin D with your doctor.    For more information on vitamin D and UV exposure, check out the AAD’s vitamin D fact sheet.    Are sunscreens safe?  Skin cancer is the most common cancer in the [place] and unprotected exposure to the sun’s harmful ultraviolet rays is a major risk factor  for skin cancer. Scientific evidence supports the benefits of using sunscreen to minimize short-term and long-term damage to the skin from UV. Claims that sunscreen ingredients are toxic or a hazard to human health have not been proven.    Because dermatologists see first-hand the impact that skin cancer has on patients and their families, the AAD recommends that everyone seek shade, wear protective clothing -- including a lightweight and long-sleeved shirt, pants, a wide-brimmed hat, and sunglasses with UV protection -- and apply a broad-spectrum sunscreen with an SPF of 30 or higher to all skin not covered by clothing. These recommendations are based on current scientific evidence -- which shows sunscreen is an effective way to reduce skin cancer risk -- and current FDA regulations.    There are two types of sunscreens available -- chemical and physical. Both protect you from the sun, but in different ways:    Chemical sunscreens work like a sponge, absorbing the sun’s rays. They contain one or more of the following active ingredients: avobenzone, ensulizole, homosalate, octinoxate, octisalate, octocrylene, or oxybenzone.    Physical sunscreens, also known as mineral sunscreens, act like a shield. They sit on the surface of the skin, primarily deflecting the sun’s rays. They include the active ingredients titanium dioxide and/or zinc oxide and are recommended for people with sensitive skin.    In 2019, the FDA announced that it will reevaluate the safety of every ingredient used in chemical sunscreens to determine whether its absorption into the bloodstream has any effects on a person’s health. (Just because an ingredient is absorbed into the bloodstream does not mean that it is harmful or unsafe). The FDA continues to advise consumers to use sunscreen to protect themselves from the sun’s dangerous UV rays.    The AAD remains committed to supporting and enhancing patient care. If you are concerned about the safety  of the ingredients in your sunscreen, speak with a board-certified dermatologist to develop a sun-protection plan that works for you.    How do I treat a sunburn?  It’s important to begin treating a sunburn as soon as possible. In addition to stopping further UV exposure, dermatologists recommend treating a sunburn with:    Cool baths to reduce the heat.    Moisturizer to help ease the discomfort caused by dryness. As soon as you get out of the bathtub, gently pat yourself dry, but leave a little water on your skin. Then apply a moisturizer to trap the water in your skin.    Hydrocortisone cream that you can buy without a prescription to help ease discomfort.    Aspirin or ibuprofen. This can help reduce the swelling, redness, and discomfort.    Drinking extra water. A sunburn draws fluid to the skin surface and away from the rest of the body. Drinking extra water prevents dehydration.    Do not treat sunburns with “-mara” products (such as benzocaine).    If your skin blisters, you have a second-degree sunburn. Dermatologists recommend that you:    Allow the blisters to heal untouched. Blisters form to help your skin heal and protect you from infection.    If the blisters cover a large area, such as the back, or you have chills, a headache or a fever, seek immediate medical care.    With any sunburn, you should avoid the sun while your skin heals. Be sure to cover the sunburn every time you head outdoors.      References:  1) Jannie RS. Prevalence of a history of skin cancer in 2007: results of an incidence-based model. Arch Dermatol. 2010 Mar;146(3):279-82.    2) Food and Drug Administration. Sunscreen: How to Help Protect Your Skin from the Sun. Accessed February 10, 2021.    3) Cooper MC, Lopez GC, Willa P, Green AC; Sunscreen and Prevention of Skin Aging, a Randomized Trial. Annals of Internal Medicine. 2013; 158(11):781-790.    4) Suni BARBER. Atmospheric sun protection factor on clear days: its observed  dependence on solar zenith angle and its relevance to the shadow rule for sun protection. Photochem Photobiol 1992;56:229-34.    5) American Academy of Dermatology. Position Statement on Vitamin D. 2010, December 22.    6) IARC Monographs on the Evaluation of Carcinogenic Risks to Humans, No. 100D. IARC Working Group on the Evaluation of Carcinogenic Risks to Humans. [place] International Agency for Research on Cancer; 2012.    7) Global Solar UV Index. World Health Organization.    8) Gabriela B, Thomas AVALOS. Application of sunscreen? theory and reality. Photodermatology, photoimmunology & photomedicine. 2014 Apr;30(2-3):.    9) NTP (National Toxicology Program). 2016. Report on Carcinogens, Fourteenth Edition.; [place], [place]: U.S[place] Department of Health and Human Services, Public Health Service.    10) ANGEL Bello et al. New Insights About Infant and Toddler Skin: Implications for Sun Protection. Pediatrics. 2011 July; 128 (1): .    11) Luli RODAS, Beau SHIELDS, Dereck FONTENOT, Savanah MASSEY, Aneesh HW. Photoprotection beyond ultraviolet radiation: A review of tinted sunscreens. J Am Acad Dermatol. 2020 April; 84.    12) Nki DAVILA, Jose E, Pamela J. Savanah FONTENOT, Leelee FISHER, Aneesh HW. Visible light. Photoprotection against visible and ultraviolet light. J Am Acad Dermatol. 2021 May;84(5):7672-4207. doi: [email or web address]. Epub 2020 Apr 23.          No orders of the defined types were placed in this encounter.    Patient Instructions   About Sun Protection and Sunscreen from the American Academy of Dermatology:    Seeking shade, wearing protective clothing -- including a lightweight and long-sleeved shirt, pants, a wide-brimmed hat, and sunglasses with UV protection -- and using sunscreen are all important behaviors to reduce your risk of skin cancer. Sunscreen products are regulated as over-the-counter drugs by the U.S. Food and Drug Administration (FDA).    Scientific evidence supports the benefits of  using sunscreen to minimize short-term and long-term damage to the skin from the sun’s rays. Claims that sunscreen ingredients are a hazard to human health have not been proven.    Who needs sunscreen?  Everyone. Sunscreen use can help prevent skin cancer by protecting you from the sun’s harmful ultraviolet (UV) rays. Anyone can get skin cancer, regardless of age, gender or race. In fact, it is estimated that one in five Americans will develop skin cancer in their lifetime.1 Sunscreen can also help prevent premature skin aging, such as wrinkles and age spots, caused by too much unprotected UV exposure. 2,3    What sunscreen should I use?  The American Academy of Dermatology recommends that everyone use sunscreen that offers the following:    Broad-spectrum protection (protects against UVA and UVB rays)    SPF 30 or higher    Water resistance    A sunscreen that offers the above helps to protect your skin from sunburn, early skin aging3 and skin cancer. However, sunscreen alone cannot fully protect you. In addition to wearing sunscreen, dermatologists recommend taking the following steps to protect your skin and detect skin cancer early, when it’s most treatable:    Seek shade when appropriate, remembering that the sun’s rays are strongest between 10 a.m. and 2 p.m. If your shadow is shorter than you are, seek shade.4    Dress to protect yourself from the sun by wearing a lightweight and long-sleeved shirt, pants, a wide-brimmed hat, and sunglasses with UV protection, when possible. For more effective sun protection, select clothing with an ultraviolet protection factor (UPF) number on the label.    Use extra caution near water, snow, and sand as they reflect the damaging rays of the sun, which can increase your chance of sunburn.    Get vitamin D safely through a healthy diet that may include vitamin supplements. Don’t seek the sun.5    Avoid tanning beds. Ultraviolet light from the sun and tanning beds can cause  skin cancer and wrinkling.3,6 If you want to look tan, you may wish to use a self-tanning product, but continue to use sunscreen with it.    Check your birthday suit on your birthday. If you notice anything changing, itching or bleeding on your skin, see a board-certified dermatologist. Skin cancer is highly treatable when caught early.    When should I use sunscreen?  Every day if you will be outside. The sun emits harmful UV rays year-round. Even on cloudy days, up to 80% of the sun’s harmful UV rays can penetrate the clouds.7    Be extra mindful about applying sunscreen around snow, sand, and water, as these surfaces reflect the sun’s rays, increasing your chance of sunburn.7    How much sunscreen should I use, and how often should I apply it?  Most people only apply about 20-50% of the amount of sunscreen needed to achieve the amount of SPF on the label.8    Apply enough sunscreen to cover all exposed skin. Most adults need about 1 ounce -- or enough to fill a shot glass -- to fully cover their entire body.    Don't forget to apply to the tops of your feet, your neck, your ears and the top of your head.    Apply sunscreen to dry skin 15 minutes before going outdoors.    Skin cancer also can form on the lips. To protect your lips, apply a lip balm or lipstick that contains sunscreen with an SPF of 30 or higher.    When outdoors, reapply sunscreen approximately every two hours, or after swimming or sweating, according to the directions on the bottle.    Broad-spectrum sunscreens protect against both UVA and UVB rays. What is the difference between the rays?  Sunlight consists of two types of harmful rays that reach the earth -- UVA rays and UVB rays. Overexposure to either can lead to skin cancer. In addition to causing skin cancer, here’s what each of these rays do:    UVA rays (or aging rays) can prematurely age your skin, causing wrinkles and age spots, and can pass through window glass.    UVB rays (or burning  rays) are the primary cause of sunburn and are blocked by window glass.    The [place] Department of Health & Human Services and the World Health Organization’s International Agency of Research on Cancer have declared UV radiation from the sun and artificial sources, such as tanning beds and sun lamps, a known carcinogen (cancer-causing substance).9    There is no safe way to tan. Every time you tan, you damage your skin. As this damage builds, you speed up the aging of your skin and increase your risk for all types of skin cancer.    What is visible light and do I need to protect my skin against it?  Visible light is defined as any light that the human eye can see -- from nature’s sunlight to artificial light sources from ceiling lights, our phones, computer screens and TVs. Research shows visible light from the sun can cause skin darkening in dark skinned, but not in light skinned individuals.10 To protect against the negative effects of visible light from the sun, use a broad-spectrum sunscreen that says “tinted” on the label and has an SPF of 30 or higher.11    What type of sunscreen should I use?  The best type of sunscreen is the one you will use again and again. Just make sure it offers broad-spectrum (UVA and UVB) protection, has an SPF of 30 or higher, and is water-resistant.    The kind of sunscreen you use is a matter of personal choice and may vary depending on the area of the body to be protected. Available sunscreen options include lotions, creams, gels, ointments, wax sticks, and sprays.    Creams are best for dry skin and for the face.    Gels are good for hairy areas, such as the scalp or male chest.    Sticks are good to use around the eyes.    Sprays are sometimes preferred by parents since they are easy to apply to children.    However, the challenge in using sprays is that it is difficult to know if you have used enough sunscreen to cover all sun-exposed areas of the body, which may result  in inadequate coverage. When using spray sunscreen, spray until your or your child’s skin glistens, and rub it in afterwards to ensure even coverage. Do not inhale these products or apply near heat, open flame or while smoking. To avoid inhaling spray sunscreen, never spray it around or near the face or mouth. Spray the sunscreen into your hands and then use your hands to apply it on your face. When applying spray sunscreens on children, be aware of the direction of the wind to avoid inhalation.    Tinted sunscreens, which contain the active ingredients zinc oxide and/or titanium dioxide, can help reduce the white “residue” that can be left on the skin and can help prevent skin darkening that may occur from exposure to visible light from the sun, particularly in dark-skinned individuals.    Some sunscreen products are also available in combination with moisturizers and cosmetics. However, while these products are convenient, remember that sunscreen needs to be reapplied every two hours when you’re outdoors.    Sunscreen may also be sold in combination with an insect repellent. The AAD recommends purchasing and using these products separately, as sunscreen needs to be applied generously and often, whereas insect repellent should be used sparingly and much less frequently.    In addition, keep in mind that while some sunscreens are water resistant, no sunscreen is “waterproof” or “sweatproof.” Sunscreen manufacturers are banned from using these terms, as they would be misleading. Even after using a water-resistant sunscreen, you should reapply it after swimming or sweating.    Regardless of which sunscreen you choose, be sure to apply it generously to achieve the UV protection indicated on the product label.    What is the difference between chemical and physical sunscreens?  Chemical sunscreens work like a sponge, absorbing the sun’s rays. They contain one or more of the following active ingredients: oxybenzone,  avobenzone, octisalate, octocrylene, homosalate, and octinoxate. These formulations tend to be easier to rub into the skin without leaving a white residue.    Physical sunscreens work like a shield, sitting on the surface of your skin and deflecting the sun’s rays. They contain the active ingredients zinc oxide and/or titanium dioxide. Opt for this sunscreen if you have sensitive skin. However, they are more likely to leave a white “residue” on the skin unless you use a sunscreen that says “tinted” on the label.    If you are concerned about certain sunscreen ingredients, you can select a formula that contains different active ingredients. As long as your sunscreen is broad-spectrum, water-resistant and has an SPF 30 or higher, it can effectively protect you from the sun.    Is a high-number SPF better than a low-number one?  Dermatologists recommend using a sunscreen with an SPF of at least 30, which blocks 97% of the sun's UVB rays. Higher-number SPFs block slightly more of the sun's UVB rays, but no sunscreen can block 100% of the sun's UVB rays.    It is also important to remember that high-number SPFs last the same amount of time as low-number SPFs. A high-number SPF does not allow you to spend additional time outdoors without reapplication. Sunscreens should be reapplied approximately every two hours when outdoors, even on cloudy days, and after swimming or sweating, according to the directions on the bottle.    How can I protect my baby or toddler from the sun?  Ideally, parents should avoid exposing babies younger than 6 months to the sun’s rays.    The best way to protect infants from the sun is to keep them in the shade as much as possible, in addition to dressing them in long sleeves, pants, a wide-brimmed hat, and sunglasses. If you can’t find shade, create your own using an umbrella, canopy, or the farfan of a stroller. Make sure they do not get overheated and that they drink plenty of fluids. If your  baby is fussy, crying excessively, or has redness on any exposed skin, take him or her indoors.    Minimize sunscreen use on children younger than six months old. However, if shade and adequate clothing are not available, parents and caretakers may apply a broad-spectrum, water-resistant sunscreen with an SPF of 30 or higher to all skin not covered by clothing, according to the instructions on the product label. When outdoors, sunscreen should be reapplied approximately every two hours, or as often as the label says. Sunscreens that use the ingredients zinc oxide or titanium dioxide, or special sunscreens made for infants or toddlers, may cause less irritation to their sensitive skin.10    Can I use the sunscreen I bought last summer, or do I need to purchase a new bottle each year? Does it lose its strength?  Dermatologists recommend using sunscreen every day when you are outside, not just during the summer. If you are using sunscreen every day and in the correct amount, a bottle should not last long. If you find a bottle of sunscreen that you have not used for some time, here are some guidelines you can follow:    The FDA requires that all sunscreens retain their original strength for at least three years.    Some sunscreens include an expiration date. If the expiration date has passed, throw out the sunscreen.    If you buy a sunscreen that does not have an expiration date, write the date you bought the sunscreen on the bottle. That way, you’ll know when to throw it out.    You also can look for visible signs that the sunscreen may no longer be good. Any obvious changes in the color or consistency of the product mean it’s time to purchase a new bottle.    Will using sunscreen limit the amount of vitamin D I get?  No, studies show that regular use of sunscreen is unlikely to decrease your skin’s production of vitamin D. Because the amount of vitamin D a person receives from the sun is inconsistent and increases  the risk of skin cancer, the AAD recommends getting vitamin D from a healthy diet that includes foods naturally rich in vitamin D, foods and beverages fortified with vitamin D, and/or vitamin D supplements.    If you are concerned that you are not getting enough vitamin D, you should discuss your options for getting vitamin D with your doctor.    For more information on vitamin D and UV exposure, check out the AAD’s vitamin D fact sheet.    Are sunscreens safe?  Skin cancer is the most common cancer in the [place] and unprotected exposure to the sun’s harmful ultraviolet rays is a major risk factor for skin cancer. Scientific evidence supports the benefits of using sunscreen to minimize short-term and long-term damage to the skin from UV. Claims that sunscreen ingredients are toxic or a hazard to human health have not been proven.    Because dermatologists see first-hand the impact that skin cancer has on patients and their families, the AAD recommends that everyone seek shade, wear protective clothing -- including a lightweight and long-sleeved shirt, pants, a wide-brimmed hat, and sunglasses with UV protection -- and apply a broad-spectrum sunscreen with an SPF of 30 or higher to all skin not covered by clothing. These recommendations are based on current scientific evidence -- which shows sunscreen is an effective way to reduce skin cancer risk -- and current FDA regulations.    There are two types of sunscreens available -- chemical and physical. Both protect you from the sun, but in different ways:    Chemical sunscreens work like a sponge, absorbing the sun’s rays. They contain one or more of the following active ingredients: avobenzone, ensulizole, homosalate, octinoxate, octisalate, octocrylene, or oxybenzone.    Physical sunscreens, also known as mineral sunscreens, act like a shield. They sit on the surface of the skin, primarily deflecting the sun’s rays. They include the active ingredients titanium dioxide  and/or zinc oxide and are recommended for people with sensitive skin.    In 2019, the FDA announced that it will reevaluate the safety of every ingredient used in chemical sunscreens to determine whether its absorption into the bloodstream has any effects on a person’s health. (Just because an ingredient is absorbed into the bloodstream does not mean that it is harmful or unsafe). The FDA continues to advise consumers to use sunscreen to protect themselves from the sun’s dangerous UV rays.    The AAD remains committed to supporting and enhancing patient care. If you are concerned about the safety of the ingredients in your sunscreen, speak with a board-certified dermatologist to develop a sun-protection plan that works for you.    How do I treat a sunburn?  It’s important to begin treating a sunburn as soon as possible. In addition to stopping further UV exposure, dermatologists recommend treating a sunburn with:    Cool baths to reduce the heat.    Moisturizer to help ease the discomfort caused by dryness. As soon as you get out of the bathtub, gently pat yourself dry, but leave a little water on your skin. Then apply a moisturizer to trap the water in your skin.    Hydrocortisone cream that you can buy without a prescription to help ease discomfort.    Aspirin or ibuprofen. This can help reduce the swelling, redness, and discomfort.    Drinking extra water. A sunburn draws fluid to the skin surface and away from the rest of the body. Drinking extra water prevents dehydration.    Do not treat sunburns with “-mara” products (such as benzocaine).    If your skin blisters, you have a second-degree sunburn. Dermatologists recommend that you:    Allow the blisters to heal untouched. Blisters form to help your skin heal and protect you from infection.    If the blisters cover a large area, such as the back, or you have chills, a headache or a fever, seek immediate medical care.    With any sunburn, you should avoid the  sun while your skin heals. Be sure to cover the sunburn every time you head outdoors.      References:  1) Valderrama RS. Prevalence of a history of skin cancer in 2007: results of an incidence-based model. Arch Dermatol. 2010 Mar;146(3):279-82.    2) Food and Drug Administration. Sunscreen: How to Help Protect Your Skin from the Sun. Accessed February 10, 2021.    3) Cooper MC, Lopez GC, Crouch P, Esau AC; Sunscreen and Prevention of Skin Aging, a Randomized Trial. Annals of Internal Medicine. 2013; 158(11):781-790.    4) Suni BARBER. Atmospheric sun protection factor on clear days: its observed dependence on solar zenith angle and its relevance to the shadow rule for sun protection. Photochem Photobiol 1992;56:229-34.    5) American Academy of Dermatology. Position Statement on Vitamin D. 2010, December 22.    6) IARC Monographs on the Evaluation of Carcinogenic Risks to Humans, No. 100D. IARC Working Group on the Evaluation of Carcinogenic Risks to Humans. [place] (): International Agency for Research on Cancer; 2012.    7) Global Solar UV Index. World Health Organization.    8) Ochoa B, Thomas HC. Application of sunscreen? theory and reality. Photodermatology, photoimmunology & photomedicine. 2014 Apr;30(2-3):.    9) NTP (National Toxicology Program). 2016. Report on Carcinogens, Fourteenth Edition.; [facility], [place]: [place] Department of Health and Human Services, Public Health Service.    10) ANGEL Bello et al. New Insights About Infant and Toddler Skin: Implications for Sun Protection. Pediatrics. 2011 July; 128 (1): .    11) Luli RODAS, Beau SHIELDS, Dereck FONTENOT, Savanah MASSEY, Aneesh HW. Photoprotection beyond ultraviolet radiation: A review of tinted sunscreens. J Am Acad Dermatol. 2020 April; 84.    12) Nik DAVILA, Jose E, Pamela FISHER. Savanah FONTENOT, Leelee FISHER, Aneesh KASPER. Visible light. Photoprotection against visible and ultraviolet light. J Am Acad Dermatol. 2021 May;84(5):7306-3700. doi:  [email or web address]. Epub 2020.         CONTACT INFORMATION:  The main office phone number is [phone]. For emergencies after hours and on weekends, this number will convert over to our answering service and the on call provider will answer. Please try to keep non emergent phone calls/ questions to office hours 9am-5pm Monday through Friday.     [employer]  As an alternative, you can sign up and use the Epic MyChart system for more direct and quicker access for non emergent questions/ problems.  [employer] allows you to send messages to your doctor, view your test results, renew your prescriptions, schedule appointments, and more. To sign up, go to [facility] and click on the Sign Up Now link in the New User? box. Enter your [employer] Activation Code exactly as it appears below along with the last four digits of your Social Security Number and your Date of Birth () to complete the sign-up process. If you do not sign up before the expiration date, you must request a new code.    [employer] Activation Code: Activation code not generated  Current [employer] Status: Active    If you have questions, you can email [email or web address] or call [phone] to talk to our [employer] staff. Remember, [employer] is NOT to be used for urgent needs. For medical emergencies, dial 911.          No follow-ups on file.    My findings and recommendations were discussed and questions were answered.     Familia Ridley MD  10/19/22  09:44 CDT

## 2022-10-19 NOTE — PATIENT INSTRUCTIONS
About Sun Protection and Sunscreen from the American Academy of Dermatology:    Seeking shade, wearing protective clothing -- including a lightweight and long-sleeved shirt, pants, a wide-brimmed hat, and sunglasses with UV protection -- and using sunscreen are all important behaviors to reduce your risk of skin cancer. Sunscreen products are regulated as over-the-counter drugs by the U.S. Food and Drug Administration (FDA).    Scientific evidence supports the benefits of using sunscreen to minimize short-term and long-term damage to the skin from the sun’s rays. Claims that sunscreen ingredients are a hazard to human health have not been proven.    Who needs sunscreen?  Everyone. Sunscreen use can help prevent skin cancer by protecting you from the sun’s harmful ultraviolet (UV) rays. Anyone can get skin cancer, regardless of age, gender or race. In fact, it is estimated that one in five Americans will develop skin cancer in their lifetime.1 Sunscreen can also help prevent premature skin aging, such as wrinkles and age spots, caused by too much unprotected UV exposure. 2,3    What sunscreen should I use?  The American Academy of Dermatology recommends that everyone use sunscreen that offers the following:    Broad-spectrum protection (protects against UVA and UVB rays)    SPF 30 or higher    Water resistance    A sunscreen that offers the above helps to protect your skin from sunburn, early skin aging3 and skin cancer. However, sunscreen alone cannot fully protect you. In addition to wearing sunscreen, dermatologists recommend taking the following steps to protect your skin and detect skin cancer early, when it’s most treatable:    Seek shade when appropriate, remembering that the sun’s rays are strongest between 10 a.m. and 2 p.m. If your shadow is shorter than you are, seek shade.4    Dress to protect yourself from the sun by wearing a lightweight and long-sleeved shirt, pants, a wide-brimmed hat, and sunglasses  with UV protection, when possible. For more effective sun protection, select clothing with an ultraviolet protection factor (UPF) number on the label.    Use extra caution near water, snow, and sand as they reflect the damaging rays of the sun, which can increase your chance of sunburn.    Get vitamin D safely through a healthy diet that may include vitamin supplements. Don’t seek the sun.5    Avoid tanning beds. Ultraviolet light from the sun and tanning beds can cause skin cancer and wrinkling.3,6 If you want to look tan, you may wish to use a self-tanning product, but continue to use sunscreen with it.    Check your birthday suit on your birthday. If you notice anything changing, itching or bleeding on your skin, see a board-certified dermatologist. Skin cancer is highly treatable when caught early.    When should I use sunscreen?  Every day if you will be outside. The sun emits harmful UV rays year-round. Even on cloudy days, up to 80% of the sun’s harmful UV rays can penetrate the clouds.7    Be extra mindful about applying sunscreen around snow, sand, and water, as these surfaces reflect the sun’s rays, increasing your chance of sunburn.7    How much sunscreen should I use, and how often should I apply it?  Most people only apply about 20-50% of the amount of sunscreen needed to achieve the amount of SPF on the label.8    Apply enough sunscreen to cover all exposed skin. Most adults need about 1 ounce -- or enough to fill a shot glass -- to fully cover their entire body.    Don't forget to apply to the tops of your feet, your neck, your ears and the top of your head.    Apply sunscreen to dry skin 15 minutes before going outdoors.    Skin cancer also can form on the lips. To protect your lips, apply a lip balm or lipstick that contains sunscreen with an SPF of 30 or higher.    When outdoors, reapply sunscreen approximately every two hours, or after swimming or sweating, according to the directions on the  bottle.    Broad-spectrum sunscreens protect against both UVA and UVB rays. What is the difference between the rays?  Sunlight consists of two types of harmful rays that reach the earth -- UVA rays and UVB rays. Overexposure to either can lead to skin cancer. In addition to causing skin cancer, here’s what each of these rays do:    UVA rays (or aging rays) can prematurely age your skin, causing wrinkles and age spots, and can pass through window glass.    UVB rays (or burning rays) are the primary cause of sunburn and are blocked by window glass.    The [place] Department of Health & Human Services and the World Health Organization’s International Agency of Research on Cancer have declared UV radiation from the sun and artificial sources, such as tanning beds and sun lamps, a known carcinogen (cancer-causing substance).9    There is no safe way to tan. Every time you tan, you damage your skin. As this damage builds, you speed up the aging of your skin and increase your risk for all types of skin cancer.    What is visible light and do I need to protect my skin against it?  Visible light is defined as any light that the human eye can see -- from nature’s sunlight to artificial light sources from ceiling lights, our phones, computer screens and TVs. Research shows visible light from the sun can cause skin darkening in dark skinned, but not in light skinned individuals.10 To protect against the negative effects of visible light from the sun, use a broad-spectrum sunscreen that says “tinted” on the label and has an SPF of 30 or higher.11    What type of sunscreen should I use?  The best type of sunscreen is the one you will use again and again. Just make sure it offers broad-spectrum (UVA and UVB) protection, has an SPF of 30 or higher, and is water-resistant.    The kind of sunscreen you use is a matter of personal choice and may vary depending on the area of the body to be protected. Available sunscreen options  include lotions, creams, gels, ointments, wax sticks, and sprays.    Creams are best for dry skin and for the face.    Gels are good for hairy areas, such as the scalp or male chest.    Sticks are good to use around the eyes.    Sprays are sometimes preferred by parents since they are easy to apply to children.    However, the challenge in using sprays is that it is difficult to know if you have used enough sunscreen to cover all sun-exposed areas of the body, which may result in inadequate coverage. When using spray sunscreen, spray until your or your child’s skin glistens, and rub it in afterwards to ensure even coverage. Do not inhale these products or apply near heat, open flame or while smoking. To avoid inhaling spray sunscreen, never spray it around or near the face or mouth. Spray the sunscreen into your hands and then use your hands to apply it on your face. When applying spray sunscreens on children, be aware of the direction of the wind to avoid inhalation.    Tinted sunscreens, which contain the active ingredients zinc oxide and/or titanium dioxide, can help reduce the white “residue” that can be left on the skin and can help prevent skin darkening that may occur from exposure to visible light from the sun, particularly in dark-skinned individuals.    Some sunscreen products are also available in combination with moisturizers and cosmetics. However, while these products are convenient, remember that sunscreen needs to be reapplied every two hours when you’re outdoors.    Sunscreen may also be sold in combination with an insect repellent. The AAD recommends purchasing and using these products separately, as sunscreen needs to be applied generously and often, whereas insect repellent should be used sparingly and much less frequently.    In addition, keep in mind that while some sunscreens are water resistant, no sunscreen is “waterproof” or “sweatproof.” Sunscreen manufacturers are banned from using these  terms, as they would be misleading. Even after using a water-resistant sunscreen, you should reapply it after swimming or sweating.    Regardless of which sunscreen you choose, be sure to apply it generously to achieve the UV protection indicated on the product label.    What is the difference between chemical and physical sunscreens?  Chemical sunscreens work like a sponge, absorbing the sun’s rays. They contain one or more of the following active ingredients: oxybenzone, avobenzone, octisalate, octocrylene, homosalate, and octinoxate. These formulations tend to be easier to rub into the skin without leaving a white residue.    Physical sunscreens work like a shield, sitting on the surface of your skin and deflecting the sun’s rays. They contain the active ingredients zinc oxide and/or titanium dioxide. Opt for this sunscreen if you have sensitive skin. However, they are more likely to leave a white “residue” on the skin unless you use a sunscreen that says “tinted” on the label.    If you are concerned about certain sunscreen ingredients, you can select a formula that contains different active ingredients. As long as your sunscreen is broad-spectrum, water-resistant and has an SPF 30 or higher, it can effectively protect you from the sun.    Is a high-number SPF better than a low-number one?  Dermatologists recommend using a sunscreen with an SPF of at least 30, which blocks 97% of the sun's UVB rays. Higher-number SPFs block slightly more of the sun's UVB rays, but no sunscreen can block 100% of the sun's UVB rays.    It is also important to remember that high-number SPFs last the same amount of time as low-number SPFs. A high-number SPF does not allow you to spend additional time outdoors without reapplication. Sunscreens should be reapplied approximately every two hours when outdoors, even on cloudy days, and after swimming or sweating, according to the directions on the bottle.    How can I protect my baby or  toddler from the sun?  Ideally, parents should avoid exposing babies younger than 6 months to the sun’s rays.    The best way to protect infants from the sun is to keep them in the shade as much as possible, in addition to dressing them in long sleeves, pants, a wide-brimmed hat, and sunglasses. If you can’t find shade, create your own using an umbrella, canopy, or the farfan of a stroller. Make sure they do not get overheated and that they drink plenty of fluids. If your baby is fussy, crying excessively, or has redness on any exposed skin, take him or her indoors.    Minimize sunscreen use on children younger than six months old. However, if shade and adequate clothing are not available, parents and caretakers may apply a broad-spectrum, water-resistant sunscreen with an SPF of 30 or higher to all skin not covered by clothing, according to the instructions on the product label. When outdoors, sunscreen should be reapplied approximately every two hours, or as often as the label says. Sunscreens that use the ingredients zinc oxide or titanium dioxide, or special sunscreens made for infants or toddlers, may cause less irritation to their sensitive skin.10    Can I use the sunscreen I bought last summer, or do I need to purchase a new bottle each year? Does it lose its strength?  Dermatologists recommend using sunscreen every day when you are outside, not just during the summer. If you are using sunscreen every day and in the correct amount, a bottle should not last long. If you find a bottle of sunscreen that you have not used for some time, here are some guidelines you can follow:    The FDA requires that all sunscreens retain their original strength for at least three years.    Some sunscreens include an expiration date. If the expiration date has passed, throw out the sunscreen.    If you buy a sunscreen that does not have an expiration date, write the date you bought the sunscreen on the bottle. That way, you’ll  know when to throw it out.    You also can look for visible signs that the sunscreen may no longer be good. Any obvious changes in the color or consistency of the product mean it’s time to purchase a new bottle.    Will using sunscreen limit the amount of vitamin D I get?  No, studies show that regular use of sunscreen is unlikely to decrease your skin’s production of vitamin D. Because the amount of vitamin D a person receives from the sun is inconsistent and increases the risk of skin cancer, the AAD recommends getting vitamin D from a healthy diet that includes foods naturally rich in vitamin D, foods and beverages fortified with vitamin D, and/or vitamin D supplements.    If you are concerned that you are not getting enough vitamin D, you should discuss your options for getting vitamin D with your doctor.    For more information on vitamin D and UV exposure, check out the AAD’s vitamin D fact sheet.    Are sunscreens safe?  Skin cancer is the most common cancer in the [place] and unprotected exposure to the sun’s harmful ultraviolet rays is a major risk factor for skin cancer. Scientific evidence supports the benefits of using sunscreen to minimize short-term and long-term damage to the skin from UV. Claims that sunscreen ingredients are toxic or a hazard to human health have not been proven.    Because dermatologists see first-hand the impact that skin cancer has on patients and their families, the AAD recommends that everyone seek shade, wear protective clothing -- including a lightweight and long-sleeved shirt, pants, a wide-brimmed hat, and sunglasses with UV protection -- and apply a broad-spectrum sunscreen with an SPF of 30 or higher to all skin not covered by clothing. These recommendations are based on current scientific evidence -- which shows sunscreen is an effective way to reduce skin cancer risk -- and current FDA regulations.    There are two types of sunscreens available -- chemical and physical.  Both protect you from the sun, but in different ways:    Chemical sunscreens work like a sponge, absorbing the sun’s rays. They contain one or more of the following active ingredients: avobenzone, ensulizole, homosalate, octinoxate, octisalate, octocrylene, or oxybenzone.    Physical sunscreens, also known as mineral sunscreens, act like a shield. They sit on the surface of the skin, primarily deflecting the sun’s rays. They include the active ingredients titanium dioxide and/or zinc oxide and are recommended for people with sensitive skin.    In 2019, the FDA announced that it will reevaluate the safety of every ingredient used in chemical sunscreens to determine whether its absorption into the bloodstream has any effects on a person’s health. (Just because an ingredient is absorbed into the bloodstream does not mean that it is harmful or unsafe). The FDA continues to advise consumers to use sunscreen to protect themselves from the sun’s dangerous UV rays.    The AAD remains committed to supporting and enhancing patient care. If you are concerned about the safety of the ingredients in your sunscreen, speak with a board-certified dermatologist to develop a sun-protection plan that works for you.    How do I treat a sunburn?  It’s important to begin treating a sunburn as soon as possible. In addition to stopping further UV exposure, dermatologists recommend treating a sunburn with:    Cool baths to reduce the heat.    Moisturizer to help ease the discomfort caused by dryness. As soon as you get out of the bathtub, gently pat yourself dry, but leave a little water on your skin. Then apply a moisturizer to trap the water in your skin.    Hydrocortisone cream that you can buy without a prescription to help ease discomfort.    Aspirin or ibuprofen. This can help reduce the swelling, redness, and discomfort.    Drinking extra water. A sunburn draws fluid to the skin surface and away from the rest of the body. Drinking  extra water prevents dehydration.    Do not treat sunburns with “-mara” products (such as benzocaine).    If your skin blisters, you have a second-degree sunburn. Dermatologists recommend that you:    Allow the blisters to heal untouched. Blisters form to help your skin heal and protect you from infection.    If the blisters cover a large area, such as the back, or you have chills, a headache or a fever, seek immediate medical care.    With any sunburn, you should avoid the sun while your skin heals. Be sure to cover the sunburn every time you head outdoors.      References:  1) Jannie RS. Prevalence of a history of skin cancer in 2007: results of an incidence-based model. Arch Dermatol. 2010 Mar;146(3):279-82.    2) Food and Drug Administration. Sunscreen: How to Help Protect Your Skin from the Sun. Accessed February 10, 2021.    3) Cooper MC, Lopez GC, Willa P, Esau AC; Sunscreen and Prevention of Skin Aging, a Randomized Trial. Annals of Internal Medicine. 2013; 158(11):781-790.    4) Suni BARBER. Atmospheric sun protection factor on clear days: its observed dependence on solar zenith angle and its relevance to the shadow rule for sun protection. Photochem Photobiol 1992;56:229-34.    5) American Academy of Dermatology. Position Statement on Vitamin D. 2010, December 22.    6) IARC Monographs on the Evaluation of Carcinogenic Risks to Humans, No. 100D. IARC Working Group on the Evaluation of Carcinogenic Risks to Humans. [place] International Agency for Research on Cancer; 2012.    7) Global Solar UV Index. World Health Organization.    8) Thomas Shelley. Application of sunscreen? theory and reality. Photodermatology, photoimmunology & photomedicine. 2014 Apr;30(2-3):.    9) NTP (National Toxicology Program). 2016. Report on Carcinogens, Fourteenth Edition.; [facility], [place]: U.S. Department of Health and Human Services, Public Health Service.    10) Ivory Bello al. New Insights About  Infant and Toddler Skin: Implications for Sun Protection. Pediatrics. 2011; 128 (1): .    11) Luli RODAS, Beau SHIELDS, Dereck I, Savanah MASSEY, Aneesh KASPER. Photoprotection beyond ultraviolet radiation: A review of tinted sunscreens. J Am Acad Dermatol. 2020; 84.    12) Nik DAVILA, Jose GABRIEL, Pamela FISHER. Savanah FONTENOT, Leelee FISHER, Aneesh KASPER. Visible light. Photoprotection against visible and ultraviolet light. J Am Acad Dermatol. 2021 May;84(5):7569-3146. doi: [email or web address]/[email or web address]. Epub 2020.         CONTACT INFORMATION:  The main office phone number is [phone]. For emergencies after hours and on weekends, this number will convert over to our answering service and the on call provider will answer. Please try to keep non emergent phone calls/ questions to office hours 9am-5pm Monday through Friday.     [employer]  As an alternative, you can sign up and use the Epic MyChart system for more direct and quicker access for non emergent questions/ problems.  [employer] allows you to send messages to your doctor, view your test results, renew your prescriptions, schedule appointments, and more. To sign up, go to [employer] and click on the Sign Up Now link in the New User? box. Enter your [employer] Activation Code exactly as it appears below along with the last four digits of your Social Security Number and your Date of Birth () to complete the sign-up process. If you do not sign up before the expiration date, you must request a new code.    [employer] Activation Code: Activation code not generated  Current [employer] Status: Active    If you have questions, you can email Mobile [email or web address] or call [phone] to talk to our [employer] staff. Remember, [employer] is NOT to be used for urgent needs. For medical emergencies, dial 911.

## 2022-11-29 RX ORDER — CEPHALEXIN 250 MG/1
CAPSULE ORAL
Qty: 30 CAPSULE | Refills: 2 | Status: SHIPPED | OUTPATIENT
Start: 2022-11-29 | End: 2022-12-08

## 2022-12-08 ENCOUNTER — OFFICE VISIT (OUTPATIENT)
Dept: FAMILY MEDICINE CLINIC | Facility: CLINIC | Age: 67
End: 2022-12-08

## 2022-12-08 VITALS
TEMPERATURE: 98.6 F | HEART RATE: 62 BPM | BODY MASS INDEX: 27.43 KG/M2 | DIASTOLIC BLOOD PRESSURE: 78 MMHG | HEIGHT: 68 IN | SYSTOLIC BLOOD PRESSURE: 128 MMHG | WEIGHT: 181 LBS | OXYGEN SATURATION: 99 % | RESPIRATION RATE: 18 BRPM

## 2022-12-08 DIAGNOSIS — J98.01 BRONCHOSPASM: ICD-10-CM

## 2022-12-08 DIAGNOSIS — J20.9 ACUTE BRONCHITIS, UNSPECIFIED ORGANISM: Primary | ICD-10-CM

## 2022-12-08 DIAGNOSIS — R09.81 NASAL CONGESTION: ICD-10-CM

## 2022-12-08 PROCEDURE — 96372 THER/PROPH/DIAG INJ SC/IM: CPT | Performed by: NURSE PRACTITIONER

## 2022-12-08 PROCEDURE — 99213 OFFICE O/P EST LOW 20 MIN: CPT | Performed by: NURSE PRACTITIONER

## 2022-12-08 RX ORDER — GUAIFENESIN 600 MG/1
1200 TABLET, EXTENDED RELEASE ORAL
Qty: 20 TABLET | Refills: 0 | Status: SHIPPED | OUTPATIENT
Start: 2022-12-08

## 2022-12-08 RX ORDER — NAPROXEN 500 MG/1
500 TABLET ORAL 2 TIMES DAILY PRN
Qty: 30 TABLET | Refills: 0 | Status: SHIPPED | OUTPATIENT
Start: 2022-12-08 | End: 2023-03-02

## 2022-12-08 RX ORDER — CEFDINIR 300 MG/1
300 CAPSULE ORAL 2 TIMES DAILY
Qty: 14 CAPSULE | Refills: 0 | Status: SHIPPED | OUTPATIENT
Start: 2022-12-08 | End: 2023-03-02

## 2022-12-08 RX ORDER — BROMPHENIRAMINE MALEATE, PSEUDOEPHEDRINE HYDROCHLORIDE, AND DEXTROMETHORPHAN HYDROBROMIDE 2; 30; 10 MG/5ML; MG/5ML; MG/5ML
5 SYRUP ORAL 4 TIMES DAILY PRN
Qty: 118 ML | Refills: 0 | Status: SHIPPED | OUTPATIENT
Start: 2022-12-08 | End: 2023-03-02

## 2022-12-08 RX ORDER — GUAIFENESIN/DEXTROMETHORPHAN 100-10MG/5
10 SYRUP ORAL 3 TIMES DAILY PRN
Qty: 118 ML | Refills: 0 | Status: CANCELLED | OUTPATIENT
Start: 2022-12-08

## 2022-12-08 RX ORDER — BUDESONIDE 0.5 MG/2ML
0.5 INHALANT ORAL
Qty: 60 ML | Refills: 0 | Status: SHIPPED | OUTPATIENT
Start: 2022-12-08 | End: 2023-03-02

## 2022-12-08 RX ORDER — FLUTICASONE PROPIONATE 50 MCG
2 SPRAY, SUSPENSION (ML) NASAL DAILY PRN
Qty: 9.9 ML | Refills: 0 | Status: SHIPPED | OUTPATIENT
Start: 2022-12-08

## 2022-12-08 RX ORDER — DEXAMETHASONE SODIUM PHOSPHATE 10 MG/ML
10 INJECTION INTRAMUSCULAR; INTRAVENOUS ONCE
Status: COMPLETED | OUTPATIENT
Start: 2022-12-08 | End: 2022-12-08

## 2022-12-08 RX ORDER — ALBUTEROL SULFATE 2.5 MG/3ML
2.5 SOLUTION RESPIRATORY (INHALATION) EVERY 4 HOURS PRN
Refills: 12 | Status: CANCELLED | OUTPATIENT
Start: 2022-12-08

## 2022-12-08 RX ORDER — ALBUTEROL SULFATE 2.5 MG/3ML
2.5 SOLUTION RESPIRATORY (INHALATION) EVERY 4 HOURS PRN
Qty: 60 ML | Refills: 0 | Status: SHIPPED | OUTPATIENT
Start: 2022-12-08 | End: 2023-03-02

## 2022-12-08 RX ORDER — DEXAMETHASONE SODIUM PHOSPHATE 10 MG/ML
10 INJECTION INTRAMUSCULAR; INTRAVENOUS ONCE
Status: DISCONTINUED | OUTPATIENT
Start: 2022-12-08 | End: 2022-12-08

## 2022-12-08 RX ADMIN — DEXAMETHASONE SODIUM PHOSPHATE 10 MG: 10 INJECTION INTRAMUSCULAR; INTRAVENOUS at 15:23

## 2022-12-08 NOTE — PROGRESS NOTES
"Chief Complaint  Cough (Cough and drainage x 10 days. 2 negative at home covid tests. )    Subjective    {Problem List  Visit Diagnosis   Encounters  Notes  Medications  Labs  Result Review Imaging  Media :23}    Ave Singh presents to [facility]  History of Present Illness    Patient presents for acute visit today.  Reports she has had a persistent cough for the past 8-10 days and has had nasal congestion and a lot of drainage as well.  Patient denies any wheezing or shortness of breath at this time.  Reports she has used cough drops and Vicks vapor rub with minimal relief. Reports her  had a few left over nebulizer treatments with Budesonide that seemed to help her somewhat.  Reports that Flonase typically helps with the nasal congestion and would like a refill of this med today.       Objective   Vital Signs:  /78 (BP Location: Right arm, Patient Position: Sitting, Cuff Size: Adult)   Pulse 62   Temp 98.6 °F (37 °C) (Infrared)   Resp 18   Ht 172.7 cm (68\") Comment: per patient  Wt 82.1 kg (181 lb)   SpO2 99%   BMI 27.52 kg/m²   Estimated body mass index is 27.52 kg/m² as calculated from the following:    Height as of this encounter: 172.7 cm (68\").    Weight as of this encounter: 82.1 kg (181 lb).          Physical Exam  Constitutional:       General: She is awake.      Appearance: Normal appearance. She is well-developed, well-groomed and overweight.   HENT:      Head: Normocephalic and atraumatic.      Right Ear: Hearing and external ear normal. Tympanic membrane is erythematous.      Left Ear: Hearing, tympanic membrane, ear canal and external ear normal.      Nose: Congestion present.      Mouth/Throat:      Lips: Pink.      Mouth: Mucous membranes are moist.      Pharynx: Oropharynx is clear. Uvula midline. No posterior oropharyngeal erythema.      Tonsils: No tonsillar exudate or tonsillar abscesses.   Eyes:      Pupils: Pupils are " equal, round, and reactive to light.   Cardiovascular:      Rate and Rhythm: Normal rate and regular rhythm.      Heart sounds: Normal heart sounds.   Pulmonary:      Effort: Pulmonary effort is normal.      Breath sounds: Normal breath sounds and air entry.      Comments: Persistent cough present  Lymphadenopathy:      Cervical: No cervical adenopathy.   Skin:     General: Skin is warm and dry.   Neurological:      Mental Status: She is alert.   Psychiatric:         Behavior: Behavior is cooperative.        Result Review :                Assessment and Plan   Diagnoses and all orders for this visit:    1. Acute bronchitis, unspecified organism (Primary)  -     Discontinue: dexamethasone (DECADRON) injection 10 mg  -     budesonide (Pulmicort) 0.5 MG/2ML nebulizer solution; Take 2 mL by nebulization Daily.  Dispense: 60 mL; Refill: 0  -     albuterol (PROVENTIL) (2.5 MG/3ML) 0.083% nebulizer solution; Take 2.5 mg by nebulization Every 4 (Four) Hours As Needed for Wheezing or Shortness of Air.  Dispense: 60 mL; Refill: 0  -     brompheniramine-pseudoephedrine-DM 30-2-10 MG/5ML syrup; Take 5 mL by mouth 4 (Four) Times a Day As Needed for Congestion or Cough.  Dispense: 118 mL; Refill: 0  -     dexamethasone (DECADRON) injection 10 mg  -     cefdinir (OMNICEF) 300 MG capsule; Take 1 capsule by mouth 2 (Two) Times a Day.  Dispense: 14 capsule; Refill: 0    2. Nasal congestion  -     fluticasone (FLONASE) 50 MCG/ACT nasal spray; 2 sprays into the nostril(s) as directed by provider Daily As Needed for Rhinitis or Allergies.  Dispense: 9.9 mL; Refill: 0  -     guaiFENesin (Mucinex) 600 MG 12 hr tablet; Take 2 tablets by mouth every night at bedtime.  Dispense: 20 tablet; Refill: 0    3. Bronchospasm  -     albuterol (PROVENTIL) (2.5 MG/3ML) 0.083% nebulizer solution; Take 2.5 mg by nebulization Every 4 (Four) Hours As Needed for Wheezing or Shortness of Air.  Dispense: 60 mL; Refill: 0    Other orders  -     naproxen  (NAPROSYN) 500 MG tablet; Take 1 tablet by mouth 2 (Two) Times a Day As Needed for Mild Pain.  Dispense: 30 tablet; Refill: 0      Follow up in office   Recommend cxr if not improving           Follow Up   Return in about 1 week (around 12/15/2022), or if symptoms worsen or fail to improve.  Patient was given instructions and counseling regarding her condition or for health maintenance advice. Please see specific information pulled into the AVS if appropriate.

## 2023-03-02 ENCOUNTER — OFFICE VISIT (OUTPATIENT)
Dept: OBSTETRICS AND GYNECOLOGY | Facility: CLINIC | Age: 68
End: 2023-03-02
Payer: MEDICARE

## 2023-03-02 ENCOUNTER — TELEPHONE (OUTPATIENT)
Dept: FAMILY MEDICINE CLINIC | Facility: CLINIC | Age: 68
End: 2023-03-02

## 2023-03-02 VITALS
DIASTOLIC BLOOD PRESSURE: 80 MMHG | BODY MASS INDEX: 28.04 KG/M2 | HEIGHT: 68 IN | WEIGHT: 185 LBS | SYSTOLIC BLOOD PRESSURE: 124 MMHG

## 2023-03-02 DIAGNOSIS — N39.41 URGE INCONTINENCE: Primary | ICD-10-CM

## 2023-03-02 DIAGNOSIS — N39.0 RECURRENT URINARY TRACT INFECTION: ICD-10-CM

## 2023-03-02 PROCEDURE — 99213 OFFICE O/P EST LOW 20 MIN: CPT | Performed by: OBSTETRICS & GYNECOLOGY

## 2023-03-02 RX ORDER — CEPHALEXIN 250 MG/1
CAPSULE ORAL
COMMUNITY
Start: 2023-02-08 | End: 2023-03-02 | Stop reason: SDUPTHER

## 2023-03-02 RX ORDER — CEPHALEXIN 250 MG/1
CAPSULE ORAL
Qty: 30 CAPSULE | Refills: 2 | Status: SHIPPED | OUTPATIENT
Start: 2023-03-02 | End: 2023-03-10

## 2023-03-02 RX ORDER — SOLIFENACIN SUCCINATE 5 MG/1
5 TABLET, FILM COATED ORAL DAILY
Qty: 90 TABLET | Refills: 2 | Status: SHIPPED | OUTPATIENT
Start: 2023-03-02

## 2023-03-02 NOTE — PROGRESS NOTES
"Subjective   Ave Singh is a 67 y.o. female.     Chief Complaint   Patient presents with   • Med Refill     Patient here today requesting refill of Vesicare and possible refill of Keflex.      History of Present Illness  67 year old postmenopausal female  s/p hysterectomy presents for follow up on Vesicare and Keflex. She was placed on keflex due to three to four antibiotics. She reports only occasional does she have urgency. She reports no episodes of leakage. She does reports dry mouth but that it is managed with drinking water. She voices no other new complaints or concerns at this time. She has an annual examination scheduled with her PCP with labs.      Review of Systems   Genitourinary: Negative for dysuria, urgency and urinary incontinence.     Objective   /80   Ht 172.7 cm (68\")   Wt 83.9 kg (185 lb)   BMI 28.13 kg/m²   No LMP recorded. Patient has had a hysterectomy.  Physical Exam  Vitals and nursing note reviewed.   Constitutional:       General: She is not in acute distress.     Appearance: She is well-developed.   HENT:      Head: Normocephalic and atraumatic.   Eyes:      Conjunctiva/sclera: Conjunctivae normal.   Pulmonary:      Effort: Pulmonary effort is normal.   Musculoskeletal:         General: Normal range of motion.      Cervical back: Normal range of motion and neck supple.   Skin:     General: Skin is warm and dry.   Neurological:      Mental Status: She is alert and oriented to person, place, and time.   Psychiatric:         Behavior: Behavior normal.         Judgment: Judgment normal.       Assessment & Plan   Problems Addressed this Visit    None  Visit Diagnoses     Urge incontinence    -  Primary    Recurrent urinary tract infection        Relevant Medications    cephalexin (KEFLEX) 250 MG capsule      Diagnoses       Codes Comments    Urge incontinence    -  Primary ICD-10-CM: N39.41  ICD-9-CM: 788.31     Recurrent urinary tract infection     ICD-10-CM: " N39.0  ICD-9-CM: 599.0       Refill of medications sent today   RTC for annual examination or sooner if symptoms worsen.        Isabel Kingston, DO

## 2023-03-02 NOTE — TELEPHONE ENCOUNTER
Caller: Ave Singh    Relationship: Self    Best call back number:  [phone]    What is the best time to reach you:  NO CALL BACK NEEDED    Who are you requesting to speak with (clinical staff, provider,  specific staff member):  N/A    What was the call regarding:  PATIENT WILL WALK-IN TO HAVE LAB WORK DONE BEFORE OFFICE VISIT    Do you require a callback:  NO

## 2023-03-06 DIAGNOSIS — I10 PRIMARY HYPERTENSION: Chronic | ICD-10-CM

## 2023-03-06 DIAGNOSIS — E78.2 MIXED HYPERLIPIDEMIA: Primary | Chronic | ICD-10-CM

## 2023-03-06 DIAGNOSIS — E55.9 VITAMIN D DEFICIENCY: ICD-10-CM

## 2023-03-07 LAB
25(OH)D3+25(OH)D2 SERPL-MCNC: 81 NG/ML (ref 30–100)
ALBUMIN SERPL-MCNC: 4.7 G/DL (ref 3.5–5.2)
ALBUMIN/GLOB SERPL: 2.1 G/DL
ALP SERPL-CCNC: 37 U/L (ref 39–117)
ALT SERPL-CCNC: 35 U/L (ref 1–33)
AST SERPL-CCNC: 22 U/L (ref 1–32)
BILIRUB SERPL-MCNC: 0.6 MG/DL (ref 0–1.2)
BUN SERPL-MCNC: 21 MG/DL (ref 8–23)
BUN/CREAT SERPL: 20.2 (ref 7–25)
CALCIUM SERPL-MCNC: 9.7 MG/DL (ref 8.6–10.5)
CHLORIDE SERPL-SCNC: 102 MMOL/L (ref 98–107)
CHOLEST SERPL-MCNC: 157 MG/DL (ref 0–200)
CO2 SERPL-SCNC: 27.9 MMOL/L (ref 22–29)
CREAT SERPL-MCNC: 1.04 MG/DL (ref 0.57–1)
EGFRCR SERPLBLD CKD-EPI 2021: 58.7 ML/MIN/1.73
ERYTHROCYTE [DISTWIDTH] IN BLOOD BY AUTOMATED COUNT: 13.3 % (ref 12.3–15.4)
GLOBULIN SER CALC-MCNC: 2.2 GM/DL
GLUCOSE SERPL-MCNC: 136 MG/DL (ref 65–99)
HCT VFR BLD AUTO: 41.8 % (ref 34–46.6)
HDLC SERPL-MCNC: 37 MG/DL (ref 40–60)
HGB BLD-MCNC: 13.2 G/DL (ref 12–15.9)
LDLC SERPL CALC-MCNC: 87 MG/DL (ref 0–100)
MCH RBC QN AUTO: 28 PG (ref 26.6–33)
MCHC RBC AUTO-ENTMCNC: 31.6 G/DL (ref 31.5–35.7)
MCV RBC AUTO: 88.6 FL (ref 79–97)
PLATELET # BLD AUTO: 229 10*3/MM3 (ref 140–450)
POTASSIUM SERPL-SCNC: 4 MMOL/L (ref 3.5–5.2)
PROT SERPL-MCNC: 6.9 G/DL (ref 6–8.5)
RBC # BLD AUTO: 4.72 10*6/MM3 (ref 3.77–5.28)
SODIUM SERPL-SCNC: 141 MMOL/L (ref 136–145)
TRIGL SERPL-MCNC: 191 MG/DL (ref 0–150)
VLDLC SERPL CALC-MCNC: 33 MG/DL (ref 5–40)
WBC # BLD AUTO: 5.51 10*3/MM3 (ref 3.4–10.8)

## 2023-03-10 ENCOUNTER — OFFICE VISIT (OUTPATIENT)
Dept: FAMILY MEDICINE CLINIC | Facility: CLINIC | Age: 68
End: 2023-03-10
Payer: MEDICARE

## 2023-03-10 VITALS
RESPIRATION RATE: 18 BRPM | TEMPERATURE: 98.6 F | BODY MASS INDEX: 28.19 KG/M2 | HEIGHT: 68 IN | DIASTOLIC BLOOD PRESSURE: 75 MMHG | WEIGHT: 186 LBS | HEART RATE: 63 BPM | SYSTOLIC BLOOD PRESSURE: 118 MMHG | OXYGEN SATURATION: 99 %

## 2023-03-10 DIAGNOSIS — I10 ESSENTIAL HYPERTENSION: Primary | Chronic | ICD-10-CM

## 2023-03-10 DIAGNOSIS — R73.01 IMPAIRED FASTING GLUCOSE: ICD-10-CM

## 2023-03-10 PROCEDURE — 3078F DIAST BP <80 MM HG: CPT | Performed by: NURSE PRACTITIONER

## 2023-03-10 PROCEDURE — 99213 OFFICE O/P EST LOW 20 MIN: CPT | Performed by: NURSE PRACTITIONER

## 2023-03-10 PROCEDURE — 3074F SYST BP LT 130 MM HG: CPT | Performed by: NURSE PRACTITIONER

## 2023-03-10 PROCEDURE — 1160F RVW MEDS BY RX/DR IN RCRD: CPT | Performed by: NURSE PRACTITIONER

## 2023-03-10 PROCEDURE — 1159F MED LIST DOCD IN RCRD: CPT | Performed by: NURSE PRACTITIONER

## 2023-03-10 RX ORDER — LOVASTATIN 40 MG/1
40 TABLET ORAL NIGHTLY
Qty: 90 TABLET | Refills: 1 | Status: SHIPPED | OUTPATIENT
Start: 2023-03-10

## 2023-03-10 RX ORDER — LOSARTAN POTASSIUM AND HYDROCHLOROTHIAZIDE 25; 100 MG/1; MG/1
1 TABLET ORAL DAILY
Qty: 90 TABLET | Refills: 1 | Status: SHIPPED | OUTPATIENT
Start: 2023-03-10 | End: 2024-03-09

## 2023-03-10 RX ORDER — METOPROLOL SUCCINATE 100 MG/1
100 TABLET, EXTENDED RELEASE ORAL DAILY
Qty: 90 TABLET | Refills: 1 | Status: SHIPPED | OUTPATIENT
Start: 2023-03-10

## 2023-03-10 RX ORDER — LOSARTAN POTASSIUM AND HYDROCHLOROTHIAZIDE 25; 100 MG/1; MG/1
1 TABLET ORAL DAILY
Qty: 90 TABLET | Refills: 1 | Status: SHIPPED | OUTPATIENT
Start: 2023-03-10 | End: 2023-03-10 | Stop reason: SDUPTHER

## 2023-03-10 RX ORDER — HYDROCHLOROTHIAZIDE 25 MG/1
25 TABLET ORAL DAILY
Qty: 90 TABLET | Refills: 1 | Status: SHIPPED | OUTPATIENT
Start: 2023-03-10

## 2023-03-10 RX ORDER — LOVASTATIN 40 MG/1
40 TABLET ORAL NIGHTLY
Qty: 90 TABLET | Refills: 1 | Status: SHIPPED | OUTPATIENT
Start: 2023-03-10 | End: 2023-03-10 | Stop reason: SDUPTHER

## 2023-03-10 NOTE — PROGRESS NOTES
Chief Complaint  Hypertension (Follow up)    Subjective        Ave Singh presents to [facility]  History of Present Illness  Presents for follow up for htn, hyperlipidemia.  Denies any chest pain, shortness of breath or palpitations  Hypertension  This is a chronic problem. The current episode started more than 1 year ago. The problem has been gradually improving since onset. The problem is controlled. Pertinent negatives include no neck pain, orthopnea or palpitations. There are no associated agents to hypertension. Risk factors for coronary artery disease include dyslipidemia, family history, sedentary lifestyle and post-menopausal state. Past treatments include angiotensin blockers and diuretics. Current antihypertension treatment includes angiotensin blockers and diuretics. The current treatment provides mild improvement. There are no compliance problems.  There is no history of kidney disease, heart failure or retinopathy. There is no history of chronic renal disease, coarctation of the aorta or hyperaldosteronism.   Hyperlipidemia  This is a chronic problem. The current episode started more than 1 year ago. The problem is uncontrolled. Recent lipid tests were reviewed and are high. She has no history of chronic renal disease, hypothyroidism or obesity. There are no known factors aggravating her hyperlipidemia. Current antihyperlipidemic treatment includes statins. The current treatment provides mild improvement of lipids. There are no compliance problems.  Risk factors for coronary artery disease include dyslipidemia, hypertension and post-menopausal.     The following portions of the patient's history were reviewed and updated as appropriate: allergies, current medications, past family history, past medical history, past social history, past surgical history and problem list.    Objective   Vital Signs:  /75 (BP Location: Right arm, Patient Position: Sitting,  "Cuff Size: Large Adult)   Pulse 63   Temp 98.6 °F (37 °C) (Infrared)   Resp 18   Ht 172.7 cm (68\") Comment: per patient  Wt 84.4 kg (186 lb)   SpO2 99%   BMI 28.28 kg/m²   Estimated body mass index is 28.28 kg/m² as calculated from the following:    Height as of this encounter: 172.7 cm (68\").    Weight as of this encounter: 84.4 kg (186 lb).       BMI is >= 25 and <30. (Overweight) The following options were offered after discussion;: exercise counseling/recommendations and nutrition counseling/recommendations      Physical Exam  Vitals and nursing note reviewed.   Constitutional:       General: She is awake.      Appearance: Normal appearance. She is well-developed and well-groomed.   HENT:      Head: Normocephalic and atraumatic.      Right Ear: Hearing, tympanic membrane, ear canal and external ear normal.      Left Ear: Hearing, tympanic membrane, ear canal and external ear normal.      Nose: Nose normal.      Mouth/Throat:      Lips: Pink.      Pharynx: Oropharynx is clear.   Eyes:      General: Lids are normal.      Conjunctiva/sclera: Conjunctivae normal.   Cardiovascular:      Rate and Rhythm: Normal rate and regular rhythm.      Heart sounds: Normal heart sounds.   Pulmonary:      Effort: Pulmonary effort is normal.      Breath sounds: Normal breath sounds and air entry.   Musculoskeletal:      Cervical back: Full passive range of motion without pain.      Right lower leg: No edema.      Left lower leg: No edema.   Lymphadenopathy:      Head:      Right side of head: No submental, submandibular or tonsillar adenopathy.      Left side of head: No submental, submandibular or tonsillar adenopathy.   Skin:     General: Skin is warm and dry.   Neurological:      Mental Status: She is alert.      Sensory: Sensation is intact.      Motor: Motor function is intact.      Coordination: Coordination is intact.      Gait: Gait is intact.   Psychiatric:         Attention and Perception: Attention and perception " normal.         Mood and Affect: Mood and affect normal.         Speech: Speech normal.         Behavior: Behavior normal. Behavior is cooperative.         Thought Content: Thought content normal.         Cognition and Memory: Cognition and memory normal.         Judgment: Judgment normal.        Result Review :             Assessment and Plan   Diagnoses and all orders for this visit:    1. Essential hypertension (Primary)  -     metoprolol succinate XL (Toprol XL) 100 MG 24 hr tablet; Take 1 tablet by mouth Daily. Delete existing refills.  Dispense: 90 tablet; Refill: 1  -     hydroCHLOROthiazide (HYDRODIURIL) 25 MG tablet; Take 1 tablet by mouth Daily.  Dispense: 90 tablet; Refill: 1  -     losartan-hydrochlorothiazide (Hyzaar) 100-25 MG per tablet; Take 1 tablet by mouth Daily. Delete existing refills.  Dispense: 90 tablet; Refill: 1  -     lovastatin (MEVACOR) 40 MG tablet; Take 1 tablet by mouth Every Night. Delete existing refills.  Dispense: 90 tablet; Refill: 1    2. Impaired fasting glucose  -     Hemoglobin A1c  -     Cancel: POC Glycosylated Hemoglobin (Hb A1C) out of cartridges      Follow Up   Return in about 6 months (around 9/10/2023) for Recheck.  Patient was given instructions and counseling regarding her condition or for health maintenance advice. Please see specific information pulled into the AVS if appropriate.     Electronically signed by Yolanda Grant DNP, APRN, 03/22/23, 11:20 PM CDT.

## 2023-03-10 NOTE — PATIENT INSTRUCTIONS
Earwax Buildup, Adult  The ears produce a substance called earwax that helps keep bacteria out of the ear and protects the skin in the ear canal. Occasionally, earwax can build up in the ear and cause discomfort or hearing loss.  What are the causes?  This condition is caused by a buildup of earwax. Ear canals are self-cleaning. Ear wax is made in the outer part of the ear canal and generally falls out in small amounts over time.  When the self-cleaning mechanism is not working, earwax builds up and can cause decreased hearing and discomfort. Attempting to clean ears with cotton swabs can push the earwax deep into the ear canal and cause decreased hearing and pain.  What increases the risk?  This condition is more likely to develop in people who:  Clean their ears often with cotton swabs.  Pick at their ears.  Use earplugs or in-ear headphones often, or wear hearing aids.  The following factors may also make you more likely to develop this condition:  Being male.  Being of older age.  Naturally producing more earwax.  Having narrow ear canals.  Having earwax that is overly thick or sticky.  Having excess hair in the ear canal.  Having eczema.  Being dehydrated.  What are the signs or symptoms?  Symptoms of this condition include:  Reduced or muffled hearing.  A feeling of fullness in the ear or feeling that the ear is plugged.  Fluid coming from the ear.  Ear pain or an itchy ear.  Ringing in the ear.  Coughing.  Balance problems.  An obvious piece of earwax that can be seen inside the ear canal.  How is this diagnosed?  This condition may be diagnosed based on:  Your symptoms.  Your medical history.  An ear exam. During the exam, your health care provider will look into your ear with an instrument called an otoscope.  You may have tests, including a hearing test.  How is this treated?  This condition may be treated by:  Using ear drops to soften the earwax.  Having the earwax removed by a health care provider. The  health care provider may:  Flush the ear with water.  Use an instrument that has a loop on the end (curette).  Use a suction device.  Having surgery to remove the wax buildup. This may be done in severe cases.  Follow these instructions at home:    Take over-the-counter and prescription medicines only as told by your health care provider.  Do not put any objects, including cotton swabs, into your ear. You can clean the opening of your ear canal with a washcloth or facial tissue.  Follow instructions from your health care provider about cleaning your ears. Do not overclean your ears.  Drink enough fluid to keep your urine pale yellow. This will help to thin the earwax.  Keep all follow-up visits as told. If earwax builds up in your ears often or if you use hearing aids, consider seeing your health care provider for routine, preventive ear cleanings. Ask your health care provider how often you should schedule your cleanings.  If you have hearing aids, clean them according to instructions from the  and your health care provider.  Contact a health care provider if:  You have ear pain.  You develop a fever.  You have pus or other fluid coming from your ear.  You have hearing loss.  You have ringing in your ears that does not go away.  You feel like the room is spinning (vertigo).  Your symptoms do not improve with treatment.  Get help right away if:  You have bleeding from the affected ear.  You have severe ear pain.  Summary  Earwax can build up in the ear and cause discomfort or hearing loss.  The most common symptoms of this condition include reduced or muffled hearing, a feeling of fullness in the ear, or feeling that the ear is plugged.  This condition may be diagnosed based on your symptoms, your medical history, and an ear exam.  This condition may be treated by using ear drops to soften the earwax or by having the earwax removed by a health care provider.  Do not put any objects, including cotton  swabs, into your ear. You can clean the opening of your ear canal with a washcloth or facial tissue.  This information is not intended to replace advice given to you by your health care provider. Make sure you discuss any questions you have with your health care provider.  Document Revised: 04/06/2021 Document Reviewed: 04/06/2021  Elsevier Patient Education © 2022 [employer]

## 2023-03-11 LAB — HBA1C MFR BLD: 6.4 % (ref 4.8–5.6)

## 2023-03-12 DIAGNOSIS — R73.03 PRE-DIABETES: Primary | ICD-10-CM

## 2023-03-14 ENCOUNTER — PATIENT MESSAGE (OUTPATIENT)
Dept: FAMILY MEDICINE CLINIC | Facility: CLINIC | Age: 68
End: 2023-03-14
Payer: MEDICARE

## 2023-03-14 DIAGNOSIS — E78.2 MIXED HYPERLIPIDEMIA: Primary | ICD-10-CM

## 2023-03-14 RX ORDER — FENOFIBRATE 145 MG/1
145 TABLET, COATED ORAL DAILY
Qty: 90 TABLET | Refills: 1 | Status: SHIPPED | OUTPATIENT
Start: 2023-03-14

## 2023-03-14 NOTE — TELEPHONE ENCOUNTER
From: Ave Singh  To: Yolanda Grant  Sent: 3/14/2023 3:49 PM CDT  Subject: Fenofibrate 145 mg    I just went to [facility] to  all my medicines, including Metformin. They did not have a prescription for fenofibrate. Just wondering if it was left off in error when you sent the others in on Friday.   (I did get all the others). Thank you

## 2023-03-14 NOTE — TELEPHONE ENCOUNTER
From: Ave Singh  To: Yolanda Grant  Sent: 3/14/2023 9:02 AM CDT  Subject: A1C result    Please go ahead and send a prescription for metformin for me to [facility]. Thank you

## 2023-05-11 DIAGNOSIS — E78.2 MIXED HYPERLIPIDEMIA: ICD-10-CM

## 2023-05-11 RX ORDER — FENOFIBRATE 145 MG/1
145 TABLET, COATED ORAL DAILY
Qty: 90 TABLET | Refills: 0 | OUTPATIENT
Start: 2023-05-11

## 2023-05-11 NOTE — TELEPHONE ENCOUNTER
Caller: Ave Singh    Relationship: Self    Best call back number: [phone]    Requested Prescriptions:   Requested Prescriptions     Pending Prescriptions Disp Refills   • fenofibrate (TRICOR) 145 MG tablet 90 tablet 1     Sig: Take 1 tablet by mouth Daily.        Pharmacy where request should be sent: [facility] [place] [address] [phone] [facility] [phone]      Last office visit with prescribing clinician: 3/10/2023   Last telemedicine visit with prescribing clinician: 3/10/2023   Next office visit with prescribing clinician: 9/12/2023     Additional details provided by patient: PATIENT HAS ABOUT 10 PILLS LEFT    Does the patient have less than a 3 day supply:  [] Yes  [x] No    Would you like a call back once the refill request has been completed: [x] Yes [] No    If the office needs to give you a call back, can they leave a voicemail: [x] Yes [] No    Goyo Jin Rep   05/11/23 13:25 CDT

## 2023-06-15 ENCOUNTER — TELEPHONE (OUTPATIENT)
Dept: OBSTETRICS AND GYNECOLOGY | Facility: CLINIC | Age: 68
End: 2023-06-15
Payer: MEDICARE

## 2023-06-15 ENCOUNTER — TELEPHONE (OUTPATIENT)
Dept: FAMILY MEDICINE CLINIC | Facility: CLINIC | Age: 68
End: 2023-06-15
Payer: MEDICARE

## 2023-06-15 NOTE — TELEPHONE ENCOUNTER
Caller: Ave Singh    Relationship: Self    Best call back number:  [phone]    What medications are you currently taking:   Current Outpatient Medications on File Prior to Visit   Medication Sig Dispense Refill    acyclovir (ZOVIRAX) 5 % cream Apply  topically to the appropriate area as directed 4 (Four) Times a Day. 5 g 3    aspirin 81 MG chewable tablet Chew 1 tablet Daily.      Cholecalciferol (Vitamin D3) 250 MCG (57329 UT) tablet Take 1,000 Units by mouth Daily.      cimetidine (TAGAMET) 200 MG tablet Take 1 tablet by mouth Daily As Needed.      fenofibrate (TRICOR) 145 MG tablet Take 1 tablet by mouth Daily. 90 tablet 1    fluticasone (FLONASE) 50 MCG/ACT nasal spray 2 sprays into the nostril(s) as directed by provider Daily As Needed for Rhinitis or Allergies. 9.9 mL 0    guaiFENesin (Mucinex) 600 MG 12 hr tablet Take 2 tablets by mouth every night at bedtime. 20 tablet 0    hydroCHLOROthiazide (HYDRODIURIL) 25 MG tablet Take 1 tablet by mouth Daily. 90 tablet 1    loratadine (CLARITIN) 10 MG tablet Take 1 tablet by mouth Daily. 90 tablet 2    losartan-hydrochlorothiazide (Hyzaar) 100-25 MG per tablet Take 1 tablet by mouth Daily. Delete existing refills. 90 tablet 1    lovastatin (MEVACOR) 40 MG tablet Take 1 tablet by mouth Every Night. Delete existing refills. 90 tablet 1    metFORMIN (Glucophage) 500 MG tablet Take 1 tablet by mouth 2 (Two) Times a Day With Meals. 180 tablet 1    metoprolol succinate XL (Toprol XL) 100 MG 24 hr tablet Take 1 tablet by mouth Daily. Delete existing refills. 90 tablet 1    Multiple Vitamins-Minerals (MULTIVITAMIN ADULT PO) Take 1 tablet by mouth Daily.      Omega-3 Fatty Acids (fish oil) 1200 MG capsule capsule Take 1 tablet by mouth Daily.      solifenacin (VESIcare) 5 MG tablet Take 1 tablet by mouth Daily. 90 tablet 2    valACYclovir (Valtrex) 1000 MG tablet Use 2 tablets twice a day x1 day with outbreaks 30 tablet 2    vitamin C (ASCORBIC ACID) 500 MG  tablet Take 2 tablets by mouth Daily.       No current facility-administered medications on file prior to visit.      CEPHALEXIN 250 MG (KEFLIN) (NEEDING A 60 SUPPLY)  TAKING IN COMBINATION WITH SOLIFENACIN  5 MG        PRIOR OBGYN HAS LEFT PRACTICE. CANNOT GET INTO NEW OBGYN UNTIL 08.16.23  (ALTHOUGH SAME PRACTICE AND WILL NOT REFILL SINCE SHE IS CONSIDERED A NEW PATIENT)      WARD NICHOLS    When did you start taking these medications:    TAKING SINCE ABOUT SEPTEMBER LAST YEAR. NEEDS PCP TO COVER FOR THIS MEDICATION UNTIL SHE GOES TO NEW OBGYN IN AUGUST.     Which medication are you concerned about:     CEPHALEXIN 250 MG    Who prescribed you this medication:     FRANCISCO J MENDEZ      [facility] - [address] [phone] [facility] [phone]

## 2023-06-15 NOTE — TELEPHONE ENCOUNTER
Pt called requesting refills on Keflex, previously prescribed to her by Dr. Kingston. Pt scheduled for an appt with Dr. Eagle 8/17/23. Pt advised to request refills from her PCP until she is able to be seen in our office. Pt voiced understanding

## 2023-06-16 DIAGNOSIS — Z87.440 HISTORY OF RECURRENT UTIS: Primary | ICD-10-CM

## 2023-06-16 RX ORDER — CEPHALEXIN 500 MG/1
500 CAPSULE ORAL 2 TIMES DAILY
Qty: 60 CAPSULE | Refills: 0 | Status: SHIPPED | OUTPATIENT
Start: 2023-06-16 | End: 2023-06-19

## 2023-06-16 NOTE — TELEPHONE ENCOUNTER
Called pt back to notify that Yolanda sent in med, but OBGYN will need to resume prescribing once she is established. Pt verbalized understanding.

## 2023-08-17 ENCOUNTER — OFFICE VISIT (OUTPATIENT)
Dept: OBSTETRICS AND GYNECOLOGY | Facility: CLINIC | Age: 68
End: 2023-08-17
Payer: MEDICARE

## 2023-08-17 VITALS
SYSTOLIC BLOOD PRESSURE: 142 MMHG | DIASTOLIC BLOOD PRESSURE: 70 MMHG | HEIGHT: 68 IN | BODY MASS INDEX: 24.71 KG/M2 | WEIGHT: 163 LBS

## 2023-08-17 DIAGNOSIS — N39.0 RECURRENT URINARY TRACT INFECTION: Primary | ICD-10-CM

## 2023-08-17 DIAGNOSIS — N39.41 URGE INCONTINENCE: ICD-10-CM

## 2023-08-17 DIAGNOSIS — Z78.9 NONSMOKER: ICD-10-CM

## 2023-08-17 DIAGNOSIS — Z80.3 FAMILY HISTORY OF BREAST CANCER: ICD-10-CM

## 2023-08-17 DIAGNOSIS — N94.10 FEMALE DYSPAREUNIA: ICD-10-CM

## 2023-08-17 DIAGNOSIS — Z01.419 ENCOUNTER FOR GYNECOLOGICAL EXAMINATION WITHOUT ABNORMAL FINDING: ICD-10-CM

## 2023-08-17 DIAGNOSIS — N81.10 FEMALE CYSTOCELE: ICD-10-CM

## 2023-08-17 DIAGNOSIS — N81.6 RECTOCELE: ICD-10-CM

## 2023-08-17 RX ORDER — CEPHALEXIN 250 MG/1
250 CAPSULE ORAL DAILY
Qty: 90 CAPSULE | Refills: 4 | Status: SHIPPED | OUTPATIENT
Start: 2023-08-17

## 2023-08-17 RX ORDER — SOLIFENACIN SUCCINATE 5 MG/1
5 TABLET, FILM COATED ORAL DAILY
Qty: 90 TABLET | Refills: 4 | Status: SHIPPED | OUTPATIENT
Start: 2023-08-17

## 2023-08-17 RX ORDER — ESTRADIOL 0.1 MG/G
2 CREAM VAGINAL DAILY
Qty: 42.5 G | Refills: 12 | Status: SHIPPED | OUTPATIENT
Start: 2023-08-17

## 2023-08-29 DIAGNOSIS — R73.03 PRE-DIABETES: ICD-10-CM

## 2023-08-29 NOTE — TELEPHONE ENCOUNTER
Rx Refill Note  Requested Prescriptions     Pending Prescriptions Disp Refills    metFORMIN (Glucophage) 500 MG tablet 180 tablet 1     Sig: Take 1 tablet by mouth 2 (Two) Times a Day With Meals.      Last office visit with prescribing clinician: 6/23/2023   Next office visit with prescribing clinician: 9/12/2023     Cecile Fisher CMA  08/29/23, 14:43 CDT

## 2023-08-29 NOTE — TELEPHONE ENCOUNTER
Caller: Ave Singh    Relationship: Self    Best call back number: [phone]      Requested Prescriptions:   Requested Prescriptions     Pending Prescriptions Disp Refills    metFORMIN (Glucophage) 500 MG tablet 180 tablet 1     Sig: Take 1 tablet by mouth 2 (Two) Times a Day With Meals.        Pharmacy where request should be sent: [facility] [address] [phone] [facility] [phone]      Last office visit with prescribing clinician: 6/23/2023   Last telemedicine visit with prescribing clinician: Visit date not found   Next office visit with prescribing clinician: 9/12/2023       Does the patient have less than a 3 day supply:  [] Yes  [x] No    Would you like a call back once the refill request has been completed: [] Yes [x] No      Goyo Dong Rep   08/29/23 14:29 CDT

## 2023-09-12 ENCOUNTER — OFFICE VISIT (OUTPATIENT)
Dept: FAMILY MEDICINE CLINIC | Facility: CLINIC | Age: 68
End: 2023-09-12
Payer: MEDICARE

## 2023-09-12 VITALS
HEART RATE: 64 BPM | TEMPERATURE: 98.6 F | BODY MASS INDEX: 25.01 KG/M2 | OXYGEN SATURATION: 99 % | SYSTOLIC BLOOD PRESSURE: 134 MMHG | HEIGHT: 68 IN | WEIGHT: 165 LBS | RESPIRATION RATE: 18 BRPM | DIASTOLIC BLOOD PRESSURE: 75 MMHG

## 2023-09-12 DIAGNOSIS — I10 ESSENTIAL HYPERTENSION: Chronic | ICD-10-CM

## 2023-09-12 DIAGNOSIS — Z00.00 MEDICARE ANNUAL WELLNESS VISIT, SUBSEQUENT: Primary | ICD-10-CM

## 2023-09-12 DIAGNOSIS — E78.2 MIXED HYPERLIPIDEMIA: ICD-10-CM

## 2023-09-12 DIAGNOSIS — R73.01 IMPAIRED FASTING GLUCOSE: ICD-10-CM

## 2023-09-12 DIAGNOSIS — E55.9 VITAMIN D DEFICIENCY: ICD-10-CM

## 2023-09-12 PROCEDURE — 1159F MED LIST DOCD IN RCRD: CPT | Performed by: NURSE PRACTITIONER

## 2023-09-12 PROCEDURE — 1160F RVW MEDS BY RX/DR IN RCRD: CPT | Performed by: NURSE PRACTITIONER

## 2023-09-12 PROCEDURE — 3078F DIAST BP <80 MM HG: CPT | Performed by: NURSE PRACTITIONER

## 2023-09-12 PROCEDURE — 1170F FXNL STATUS ASSESSED: CPT | Performed by: NURSE PRACTITIONER

## 2023-09-12 PROCEDURE — 3075F SYST BP GE 130 - 139MM HG: CPT | Performed by: NURSE PRACTITIONER

## 2023-09-12 PROCEDURE — G0439 PPPS, SUBSEQ VISIT: HCPCS | Performed by: NURSE PRACTITIONER

## 2023-09-12 RX ORDER — FENOFIBRATE 145 MG/1
145 TABLET, COATED ORAL DAILY
Qty: 90 TABLET | Refills: 1 | Status: SHIPPED | OUTPATIENT
Start: 2023-09-12

## 2023-09-12 RX ORDER — ACYCLOVIR 50 MG/G
CREAM TOPICAL 4 TIMES DAILY
Qty: 5 G | Refills: 3 | Status: SHIPPED | OUTPATIENT
Start: 2023-09-12

## 2023-09-12 RX ORDER — LOSARTAN POTASSIUM AND HYDROCHLOROTHIAZIDE 25; 100 MG/1; MG/1
1 TABLET ORAL DAILY
Qty: 90 TABLET | Refills: 1 | Status: SHIPPED | OUTPATIENT
Start: 2023-09-12 | End: 2024-09-11

## 2023-09-12 RX ORDER — METOPROLOL SUCCINATE 100 MG/1
100 TABLET, EXTENDED RELEASE ORAL DAILY
Qty: 90 TABLET | Refills: 1 | Status: SHIPPED | OUTPATIENT
Start: 2023-09-12

## 2023-09-12 RX ORDER — LOVASTATIN 40 MG/1
40 TABLET ORAL NIGHTLY
Qty: 90 TABLET | Refills: 1 | Status: SHIPPED | OUTPATIENT
Start: 2023-09-12

## 2023-09-12 NOTE — PROGRESS NOTES
The ABCs of the Annual Wellness Visit  Subsequent Medicare Wellness Visit    Subjective    Ave Singh is a 67 y.o. female who presents for a Subsequent Medicare Wellness Visit.    The following portions of the patient's history were reviewed and updated as appropriate: allergies, current medications, past family history, past medical history, past social history, past surgical history, and problem list.    Compared to one year ago, the patient feels her physical health is the same.    Compared to one year ago, the patient feels her mental health is the same.    BMI is >= 25 and <30. (Overweight) The following options were offered after discussion;: exercise counseling/recommendations and nutrition counseling/recommendations       Recent Hospitalizations:  She was not admitted to the hospital during the last year.       Current Medical Providers:  Patient Care Team:  Yolanda Grant DNP, APRN as PCP - General (Family Medicine)  Aggie Walton MD as Consulting Physician (Ophthalmology)  Melina Stein APRN as Nurse Practitioner (Cardiology)  Familia Ridley MD as Consulting Physician (Otolaryngology)  Kendra Zhong APRN as Nurse Practitioner (Nurse Practitioner)  Yolanda Grant DNP, APRN as Referring Physician (Family Medicine)  Phuong Tamayo PA as Physician Assistant (Physician Assistant)  Azalea Jernigan RN as Ambulatory  ([facility])      Outpatient Medications Prior to Visit   Medication Sig Dispense Refill    acyclovir (ZOVIRAX) 5 % cream Apply  topically to the appropriate area as directed 4 (Four) Times a Day. 5 g 3    aspirin 81 MG chewable tablet Chew 1 tablet Daily.      cephalexin (KEFLEX) 250 MG capsule Take 1 capsule by mouth Daily. 90 capsule 4    cimetidine (TAGAMET) 200 MG tablet Take 1 tablet by mouth Daily As Needed.      estradiol (ESTRACE VAGINAL) 0.1 MG/GM vaginal cream Insert 2 g into the vagina Daily. 42.5 g 12    fenofibrate (TRICOR) 145 MG  tablet Take 1 tablet by mouth Daily. 90 tablet 1    fluticasone (FLONASE) 50 MCG/ACT nasal spray 2 sprays into the nostril(s) as directed by provider Daily As Needed for Rhinitis or Allergies. 9.9 mL 0    losartan-hydrochlorothiazide (Hyzaar) 100-25 MG per tablet Take 1 tablet by mouth Daily. Delete existing refills. 90 tablet 1    lovastatin (MEVACOR) 40 MG tablet Take 1 tablet by mouth Every Night. Delete existing refills. 90 tablet 1    metFORMIN (Glucophage) 500 MG tablet Take 1 tablet by mouth 2 (Two) Times a Day With Meals. 180 tablet 1    metoprolol succinate XL (Toprol XL) 100 MG 24 hr tablet Take 1 tablet by mouth Daily. Delete existing refills. 90 tablet 1    Multiple Vitamins-Minerals (MULTIVITAMIN ADULT PO) Take 1 tablet by mouth Daily.      Omega-3 Fatty Acids (fish oil) 1200 MG capsule capsule Take 1 tablet by mouth Daily.      solifenacin (VESIcare) 5 MG tablet Take 1 tablet by mouth Daily. 90 tablet 4    valACYclovir (Valtrex) 1000 MG tablet Use 2 tablets twice a day x1 day with outbreaks 30 tablet 2    vitamin C (ASCORBIC ACID) 500 MG tablet Take 2 tablets by mouth Daily.      vitamin D3 125 MCG (5000 UT) capsule capsule Take 1 capsule by mouth Daily.       No facility-administered medications prior to visit.       No opioid medication identified on active medication list. I have reviewed chart for other potential  high risk medication/s and harmful drug interactions in the elderly.        Aspirin is on active medication list. Aspirin use is indicated based on review of current medical condition/s. Pros and cons of this therapy have been discussed today. Benefits of this medication outweigh potential harm.  Patient has been encouraged to continue taking this medication.  .      Patient Active Problem List   Diagnosis    Hypertension    Hyperlipidemia    Herpes simplex    Plantar fasciitis, bilateral    Osteoporosis    Plantar fasciitis    Vitamin D deficiency    Benign neoplasm of ascending colon     "Snoring    Other fatigue    Abnormal EKG    Shortness of breath    Palpitations    Positive colorectal cancer screening using Cologuard test    Hx of colonic polyps    Abnormal results of liver function studies    Osteopenia    Benign neoplasm of colon    Herpes simplex    Mixed hyperlipidemia    Mallet toe    Calcaneal spur    Contracture of Achilles tendon    Foot pain    Hammer toe    Abnormal liver function tests    Benign neoplasm of ascending colon    Mixed hyperlipidemia    Plantar fasciitis    COVID-19    Finding of above normal blood pressure    Inflamed seborrheic keratosis    Other skin changes due to chronic exposure to nonionizing radiation    Melanocytic nevi of trunk    Neoplasm of uncertain behavior of skin    Psoriasis    Xerosis cutis    Hypertension    Other specified epidermal thickening    Other melanin hyperpigmentation     Advance Care Planning   Advance Care Planning     Advance Directive is not on file.  ACP discussion was held with the patient during this visit. Patient has an advance directive (not in EMR), copy requested.     Objective    Vitals:    09/12/23 0856 09/12/23 0902   BP: 148/72 134/75   BP Location: Left arm Right arm   Patient Position: Sitting Sitting   Cuff Size: Large Adult Large Adult   Pulse: 66 64   Resp: 18    Temp: 98.6 °F (37 °C)    TempSrc: Infrared    SpO2: 99%    Weight: 74.8 kg (165 lb)    Height: 172.7 cm (68\")  Comment: per patient    PainSc: 0-No pain      Estimated body mass index is 25.09 kg/m² as calculated from the following:    Height as of this encounter: 172.7 cm (68\").    Weight as of this encounter: 74.8 kg (165 lb).           Does the patient have evidence of cognitive impairment? No    Lab Results   Component Value Date    HGBA1C 6.00 (H) 06/22/2023        HEALTH RISK ASSESSMENT    Smoking Status:  Social History     Tobacco Use   Smoking Status Never   Smokeless Tobacco Never     Alcohol Consumption:  Social History     Substance and Sexual " Activity   Alcohol Use Never     Fall Risk Screen:    AC Fall Risk Assessment was completed, and patient is at LOW risk for falls.Assessment completed on:2023    Depression Screenin/12/2023     9:00 AM   PHQ-2/PHQ-9 Depression Screening   Little Interest or Pleasure in Doing Things 0-->not at all   Feeling Down, Depressed or Hopeless 0-->not at all   PHQ-9: Brief Depression Severity Measure Score 0       Health Habits and Functional and Cognitive Screenin/12/2023     9:00 AM   Functional & Cognitive Status   Do you have difficulty preparing food and eating? No   Do you have difficulty bathing yourself, getting dressed or grooming yourself? No   Do you have difficulty using the toilet? No   Do you have difficulty moving around from place to place? No   Do you have trouble with steps or getting out of a bed or a chair? No   Current Diet Well Balanced Diet   Dental Exam Up to date   Eye Exam Up to date   Exercise (times per week) 5 times per week   Current Exercises Include Walking;Yard Work   Do you need help using the phone?  No   Are you deaf or do you have serious difficulty hearing?  No   Do you need help to go to places out of walking distance? No   Do you need help shopping? No   Do you need help preparing meals?  No   Do you need help with housework?  No   Do you need help with laundry? No   Do you need help taking your medications? No   Do you need help managing money? No   Do you ever drive or ride in a car without wearing a seat belt? No   Have you felt unusual stress, anger or loneliness in the last month? No   Who do you live with? Spouse   If you need help, do you have trouble finding someone available to you? No   Have you been bothered in the last four weeks by sexual problems? No   Do you have difficulty concentrating, remembering or making decisions? No       Age-appropriate Screening Schedule:  Refer to the list below for future screening recommendations based on patient's  age, sex and/or medical conditions. Orders for these recommended tests are listed in the plan section. The patient has been provided with a written plan.    Health Maintenance   Topic Date Due    COVID-19 Vaccine (3 - Pfizer series) 09/14/2023 (Originally 6/4/2021)    Pneumococcal Vaccine 65+ (2 - PCV) 12/08/2023 (Originally 5/27/2021)    TDAP/TD VACCINES (2 - Td or Tdap) 12/08/2023 (Originally 9/17/2022)    INFLUENZA VACCINE  10/01/2023    LIPID PANEL  03/06/2024    BMI FOLLOWUP  06/23/2024    MAMMOGRAM  07/11/2024    ANNUAL WELLNESS VISIT  09/12/2024    DXA SCAN  07/19/2025    COLORECTAL CANCER SCREENING  05/23/2029    ZOSTER VACCINE  Completed    HEPATITIS C SCREENING  Addressed    PAP SMEAR  Discontinued                Health Maintenance Summary    Postponed - COVID-19 Vaccine: (3 - Pfizer series): Postponed until 9/14/2023 09/12/2023  Postponed until 9/14/2023 by Cecile Fisher CMA (Patient Refused)    07/28/2023  Postponed until 7/30/2023 by Cecile Fisher CMA (Patient Refused)    06/23/2023  Postponed until 6/25/2023 by Cecile Fisher MA (Patient Refused)    03/10/2023  Postponed until 3/12/2023 by Cecile Fisher MA (Patient Refused)    12/08/2022  Postponed until 12/10/2022 by Cecile Fisher MA (Patient Refused)         Postponed - Pneumococcal Vaccine 65+: (2 - PCV): Postponed until 12/8/2023 12/08/2022  Postponed until 12/8/2023 by Cecile Fisher MA (Patient Refused)    06/06/2022  Postponed until 7/19/2022 by Yolanda Grant, DNP, APRN (Pending event)    05/27/2020  Imm Admin: Pneumococcal Polysaccharide (PPSV23)      Postponed - TDAP/TD VACCINES: (2 - Td or Tdap): Postponed until 12/8/2023 12/08/2022  Postponed until 12/8/2023 by Cecile Fisher MA (Patient Refused)    09/17/2012  Imm Admin: Tdap      INFLUENZA VACCINE: (Yearly - October to March): Next due on 10/1/2023  12/08/2022  Postponed until 3/31/2023 by Cecile Fisher MA (Patient Refused)    11/10/2021  Imm Admin: Fluzone High Dose =>65  Years ([employer] ONLY)    10/15/2020  Imm Admin: Fluzone High Dose =>65 Years ([employer] ONLY)    10/15/2020  Imm Admin: Flublock Quad =>18yrs    10/15/2020  Imm Admin: Fluzone Quad >6mos (Multi-dose)         LIPID PANEL: (Yearly): Next due on 3/6/2024  03/06/2023  Lipid panel    03/11/2022  Lipid panel    12/23/2021  Lipid panel    09/17/2021  Lipid panel    03/12/2021  Lipid panel         BMI FOLLOWUP: (Yearly): Next due on 6/23/2024 06/23/2023  Registry Metric: Date of last BMI follow-up      MAMMOGRAM: (Yearly): Next due on 7/11/2024 07/11/2023  Mammo Screening Digital Tomosynthesis Bilateral With CAD    06/16/2022  Mammo Screening Digital Tomosynthesis Bilateral With CAD    06/14/2021  Mammo Screening Digital Tomosynthesis Bilateral With CAD    03/18/2020  Mammo Screening Digital Tomosynthesis Bilateral With CAD    02/25/2019  Mammo Screening Digital Tomosynthesis Bilateral With CAD         ANNUAL WELLNESS VISIT: (Yearly): Next due on 9/12/2024 09/12/2023  Done    06/06/2022  Level of Service: HI PPPS, SUBSEQ VISIT    06/06/2022  Done    05/28/2021  Done    05/28/2021  Level of Service: HI PPPS, SUBSEQ VISIT         DXA SCAN: (Every 2 Years): Next due on 7/19/2025 07/19/2023  DEXA Bone Density Axial    09/12/2022  Postponed until 9/12/2022 by Yolanda Grant, DNP, APRN (Patient Refused - states had 2 yrs ago that was normal does not want another one.)    06/11/2020  DEXA Bone Density Axial    12/04/2013  Done    12/04/2013  DEXA BONE DENSITY AXIAL      COLORECTAL CANCER SCREENING: (COLONOSCOPY - Every 10 Years): Next due on 5/23/2029 05/23/2019  Surgical Procedure: COLONOSCOPY    05/23/2019  COLONOSCOPY    04/25/2019  Cologuard - Stool, Per Rectum      HEPATITIS C SCREENING: Addressed  04/16/2019  Declined      ZOSTER VACCINE: (Series Information)Completed  11/10/2021  Imm Admin: Shingrix    08/11/2021  Imm Admin: Shingrix    05/28/2021  Postponed until 5/28/2022 by Cecile Fisher MA (Patient Refused)     04/16/2019  Postponed until 4/16/2019 by Yolanda Grant DNP, APRN (Patient Refused)      Discontinued - PAP SMEAR: Discontinued  05/27/2020  Frequency changed to Never by Brian Olson APRN (declining per patient's gynecologists recommendations unless issues develop)      CMS Preventative Services Quick Reference  Risk Factors Identified During Encounter  Alcohol Misuse:  denies  Chronic Pain:  denies  Depression/Dysphoria:  denies  Drug Use/Abuse Identified or Suspected:  denies  Fall Risk-High or Moderate: Discussed Fall Prevention in the home.  Not at risk for fall   Immunizations Discussed/Encouraged:  all up to date  Polypharmacy: Medication List reviewed and Medications are appropriate for patient  Dental Screening Recommended:  up to date  Vision Screening Recommended: up to date  The above risks/problems have been discussed with the patient.  Pertinent information has been shared with the patient in the After Visit Summary.  An After Visit Summary and PPPS were made available to the patient.          Additional E&M Note during same encounter follows:  Patient has multiple medical problems which are significant and separately identifiable that require additional work above and beyond the Medicare Wellness Visit.      Chief Complaint  Medicare Wellness-subsequent (Pt here for annual wellness exam. )     Subjective        HPI  Ave Singh is also being seen today for and chronic problems of htn, hyperlipidemia'    Review of Systems   HENT: Negative.     Respiratory: Negative.     Gastrointestinal: Negative.    Genitourinary: Negative.    Musculoskeletal: Negative.    Neurological: Negative.    Hematological: Negative.    Psychiatric/Behavioral: Negative.     All other systems reviewed and are negative.    Objective   Vital Signs:  /75 (BP Location: Right arm, Patient Position: Sitting, Cuff Size: Large Adult)   Pulse 64   Temp 98.6 °F (37 °C) (Infrared)   Resp 18   Ht 172.7 cm  "(68\") Comment: per patient  Wt 74.8 kg (165 lb)   SpO2 99%   BMI 25.09 kg/m²     Physical Exam  Vitals and nursing note reviewed.   Constitutional:       General: She is awake.      Appearance: Normal appearance. She is well-developed and well-groomed.   HENT:      Head: Normocephalic and atraumatic.      Right Ear: Hearing, tympanic membrane, ear canal and external ear normal.      Left Ear: Hearing, tympanic membrane, ear canal and external ear normal.      Nose: Nose normal.      Mouth/Throat:      Lips: Pink.      Pharynx: Oropharynx is clear.   Eyes:      General: Lids are normal.      Conjunctiva/sclera: Conjunctivae normal.   Cardiovascular:      Rate and Rhythm: Normal rate and regular rhythm.      Heart sounds: Normal heart sounds.   Pulmonary:      Effort: Pulmonary effort is normal.      Breath sounds: Normal breath sounds and air entry.   Musculoskeletal:      Cervical back: Full passive range of motion without pain.      Right lower leg: No edema.      Left lower leg: No edema.   Lymphadenopathy:      Head:      Right side of head: No submental, submandibular or tonsillar adenopathy.      Left side of head: No submental, submandibular or tonsillar adenopathy.   Skin:     General: Skin is warm and dry.   Neurological:      Mental Status: She is alert.      Sensory: Sensation is intact.      Motor: Motor function is intact.      Coordination: Coordination is intact.      Gait: Gait is intact.   Psychiatric:         Attention and Perception: Attention and perception normal.         Mood and Affect: Mood and affect normal.         Speech: Speech normal.         Behavior: Behavior normal. Behavior is cooperative.         Thought Content: Thought content normal.         Cognition and Memory: Cognition and memory normal.         Judgment: Judgment normal.                       Assessment and Plan   Diagnoses and all orders for this visit:    1. Medicare annual wellness visit, subsequent (Primary)  -     CBC " (No Diff)  -     Comprehensive metabolic panel  -     Lipid panel  -     Vitamin D 25 hydroxy  -     Hemoglobin A1c    2. Essential hypertension  -     lovastatin (MEVACOR) 40 MG tablet; Take 1 tablet by mouth Every Night. Delete existing refills.  Dispense: 90 tablet; Refill: 1  -     losartan-hydrochlorothiazide (Hyzaar) 100-25 MG per tablet; Take 1 tablet by mouth Daily. Delete existing refills.  Dispense: 90 tablet; Refill: 1  -     metoprolol succinate XL (Toprol XL) 100 MG 24 hr tablet; Take 1 tablet by mouth Daily. Delete existing refills.  Dispense: 90 tablet; Refill: 1  -     CBC (No Diff)  -     Comprehensive metabolic panel  -     Lipid panel  -     Vitamin D 25 hydroxy  -     Hemoglobin A1c    3. Mixed hyperlipidemia  -     fenofibrate (TRICOR) 145 MG tablet; Take 1 tablet by mouth Daily.  Dispense: 90 tablet; Refill: 1  -     CBC (No Diff)  -     Comprehensive metabolic panel  -     Lipid panel  -     Vitamin D 25 hydroxy  -     Hemoglobin A1c    4. Impaired fasting glucose  -     Hemoglobin A1c    5. Vitamin D deficiency  -     Vitamin D 25 hydroxy    Other orders  -     acyclovir (ZOVIRAX) 5 % cream; Apply  topically to the appropriate area as directed 4 (Four) Times a Day.  Dispense: 5 g; Refill: 3             Follow Up   Return in about 1 year (around 9/12/2024) for Medicare Wellness 6 month chronic .  Patient was given instructions and counseling regarding her condition or for health maintenance advice. Please see specific information pulled into the AVS if appropriate.         Electronically signed by Yolanda Grant, BIBIANA, APRN, 09/12/23, 8:27 PM CDT.

## 2023-09-13 LAB
25(OH)D3+25(OH)D2 SERPL-MCNC: 86.7 NG/ML (ref 30–100)
ALBUMIN SERPL-MCNC: 4.7 G/DL (ref 3.5–5.2)
ALBUMIN/GLOB SERPL: 2.5 G/DL
ALP SERPL-CCNC: 36 U/L (ref 39–117)
ALT SERPL-CCNC: 26 U/L (ref 1–33)
AST SERPL-CCNC: 21 U/L (ref 1–32)
BILIRUB SERPL-MCNC: 0.4 MG/DL (ref 0–1.2)
BUN SERPL-MCNC: 23 MG/DL (ref 8–23)
BUN/CREAT SERPL: 23.5 (ref 7–25)
CALCIUM SERPL-MCNC: 10.3 MG/DL (ref 8.6–10.5)
CHLORIDE SERPL-SCNC: 104 MMOL/L (ref 98–107)
CHOLEST SERPL-MCNC: 143 MG/DL (ref 0–200)
CO2 SERPL-SCNC: 26.3 MMOL/L (ref 22–29)
CREAT SERPL-MCNC: 0.98 MG/DL (ref 0.57–1)
EGFRCR SERPLBLD CKD-EPI 2021: 63 ML/MIN/1.73
ERYTHROCYTE [DISTWIDTH] IN BLOOD BY AUTOMATED COUNT: 12.3 % (ref 12.3–15.4)
GLOBULIN SER CALC-MCNC: 1.9 GM/DL
GLUCOSE SERPL-MCNC: 120 MG/DL (ref 65–99)
HBA1C MFR BLD: 5.8 % (ref 4.8–5.6)
HCT VFR BLD AUTO: 38.2 % (ref 34–46.6)
HDLC SERPL-MCNC: 43 MG/DL (ref 40–60)
HGB BLD-MCNC: 12.6 G/DL (ref 12–15.9)
LDLC SERPL CALC-MCNC: 73 MG/DL (ref 0–100)
MCH RBC QN AUTO: 29.1 PG (ref 26.6–33)
MCHC RBC AUTO-ENTMCNC: 33 G/DL (ref 31.5–35.7)
MCV RBC AUTO: 88.2 FL (ref 79–97)
PLATELET # BLD AUTO: 249 10*3/MM3 (ref 140–450)
POTASSIUM SERPL-SCNC: 3.8 MMOL/L (ref 3.5–5.2)
PROT SERPL-MCNC: 6.6 G/DL (ref 6–8.5)
RBC # BLD AUTO: 4.33 10*6/MM3 (ref 3.77–5.28)
SODIUM SERPL-SCNC: 142 MMOL/L (ref 136–145)
TRIGL SERPL-MCNC: 156 MG/DL (ref 0–150)
VLDLC SERPL CALC-MCNC: 27 MG/DL (ref 5–40)
WBC # BLD AUTO: 7.99 10*3/MM3 (ref 3.4–10.8)

## 2024-01-23 ENCOUNTER — OFFICE VISIT (OUTPATIENT)
Dept: FAMILY MEDICINE CLINIC | Facility: CLINIC | Age: 69
End: 2024-01-23
Payer: MEDICARE

## 2024-01-23 VITALS
HEIGHT: 68 IN | TEMPERATURE: 96.8 F | BODY MASS INDEX: 24.86 KG/M2 | WEIGHT: 164 LBS | SYSTOLIC BLOOD PRESSURE: 115 MMHG | DIASTOLIC BLOOD PRESSURE: 68 MMHG | RESPIRATION RATE: 18 BRPM | HEART RATE: 63 BPM | OXYGEN SATURATION: 99 %

## 2024-01-23 DIAGNOSIS — J40 BRONCHITIS: Primary | ICD-10-CM

## 2024-01-23 RX ORDER — DEXAMETHASONE SODIUM PHOSPHATE 10 MG/ML
10 INJECTION INTRAMUSCULAR; INTRAVENOUS ONCE
Status: COMPLETED | OUTPATIENT
Start: 2024-01-23 | End: 2024-01-23

## 2024-01-23 RX ORDER — CODEINE PHOSPHATE/GUAIFENESIN 10-100MG/5
5 LIQUID (ML) ORAL 3 TIMES DAILY PRN
Qty: 120 ML | Refills: 0 | Status: SHIPPED | OUTPATIENT
Start: 2024-01-23

## 2024-01-23 RX ORDER — ALBUTEROL SULFATE 1.25 MG/3ML
1 SOLUTION RESPIRATORY (INHALATION) EVERY 6 HOURS PRN
Qty: 25 EACH | Refills: 5 | Status: SHIPPED | OUTPATIENT
Start: 2024-01-23

## 2024-01-23 RX ADMIN — DEXAMETHASONE SODIUM PHOSPHATE 10 MG: 10 INJECTION INTRAMUSCULAR; INTRAVENOUS at 15:51

## 2024-01-23 NOTE — PROGRESS NOTES
"Chief Complaint  Cough    Subjective        Ave Singh presents to [facility]  History of Present Illness    The patient is a 68-year-old female who is being seen today for a cough that has been going on quite a bit. She says that it feels like it is moving down into her chest. Past medical history does include some bronchitis. She has had history of sinus infections. She also has hypertension currently. Blood pressure today was good at 115/68 mmHg. She does have vitamin D deficiency and takes vitamin D daily. She has hyperlipidemia and she takes omega-3 fatty acids and lovastatin. Also, she is on hormone replacement therapy and takes estradiol vaginal cream. She does take a daily aspirin. She does have recurrent fever blisters and takes Valtrex and uses Zovirax ointment. She does take Tagamet for acid reflux. She does have some problems with overactive bladder and takes Vesicare. Type 2 diabetes mellitus, she takes Glucophage for that. Besides omega-3 and lovastatin, she is currently on fenofibrate. Also, for hypertension, she takes Hyzaar, metoprolol, and she does take vitamin C OTC.    She denies any fever or chills. She has not been blowing anything out of her nose. She is not coughing up anything because it makes her want to throw up. She denies any body aches or pains, sore throat, or shortness of breath. Her cough does not get worse when she lays down. She did have a nebulizer that she has used for the last couple of nights, but it has .    The following portions of the patient's history were reviewed and updated as appropriate: allergies, current medications, past family history, past medical history, past social history, past surgical history and problem list.    Objective   Vital Signs:  /68 (BP Location: Right arm, Patient Position: Sitting, Cuff Size: Large Adult)   Pulse 63   Temp 96.8 °F (36 °C) (Infrared)   Resp 18   Ht 172.7 cm (68\") " "Comment: per patient  Wt 74.4 kg (164 lb)   SpO2 99%   BMI 24.94 kg/m²   Estimated body mass index is 24.94 kg/m² as calculated from the following:    Height as of this encounter: 172.7 cm (68\").    Weight as of this encounter: 74.4 kg (164 lb).     BMI is within normal parameters. No other follow-up for BMI required.    Physical Exam  Vitals and nursing note reviewed.   Constitutional:       General: She is awake.      Appearance: She is well-developed and well-groomed. She is ill-appearing.   HENT:      Head: Normocephalic and atraumatic.      Right Ear: Hearing, tympanic membrane, ear canal and external ear normal.      Left Ear: Hearing, tympanic membrane, ear canal and external ear normal.      Nose: Congestion present.      Mouth/Throat:      Lips: Pink.      Pharynx: Oropharynx is clear.   Eyes:      General: Lids are normal.      Conjunctiva/sclera: Conjunctivae normal.   Cardiovascular:      Rate and Rhythm: Regular rhythm. Bradycardia present.      Heart sounds: Normal heart sounds.   Pulmonary:      Effort: Pulmonary effort is normal.      Breath sounds: Normal breath sounds and air entry.   Musculoskeletal:      Cervical back: Full passive range of motion without pain.      Right lower leg: No edema.      Left lower leg: No edema.   Lymphadenopathy:      Head:      Right side of head: No submental, submandibular or tonsillar adenopathy.      Left side of head: No submental, submandibular or tonsillar adenopathy.   Skin:     General: Skin is warm and dry.   Neurological:      Mental Status: She is alert and oriented to person, place, and time.      Sensory: Sensation is intact.      Motor: Motor function is intact.      Coordination: Coordination is intact.      Gait: Gait is intact.   Psychiatric:         Attention and Perception: Attention and perception normal.         Mood and Affect: Mood and affect normal.         Speech: Speech normal.         Behavior: Behavior normal. Behavior is cooperative.  "        Thought Content: Thought content normal.         Cognition and Memory: Cognition and memory normal.         Judgment: Judgment normal.      Vital Signs  Blood pressure is 115/68 mmHg.    Result Review :            Assessment and Plan     Diagnoses and all orders for this visit:    1. Bronchitis (Primary)  -     dexAMETHasone (DECADRON) injection 10 mg  -     albuterol (ACCUNEB) 1.25 MG/3ML nebulizer solution; Take 3 mL by nebulization Every 6 (Six) Hours As Needed for Shortness of Air or Wheezing.  Dispense: 25 each; Refill: 5  -     guaiFENesin-codeine (GUAIFENESIN AC) 100-10 MG/5ML liquid; Take 5 mL by mouth 3 (Three) Times a Day As Needed for Cough or Congestion.  Dispense: 120 mL; Refill: 0         Follow Up     Return in about 1 week (around 1/30/2024), or if symptoms worsen or fail to improve.    Patient was given instructions and counseling regarding her condition or for health maintenance advice. Please see specific information pulled into the AVS if appropriate.     Transcribed from ambient dictation for Yolanda Grant DNP, MARY by Kim Sears.  01/23/24   15:53 CST    Patient or patient representative verbalized consent to the visit recording.  I have personally performed the services described in this document as transcribed by the above individual, and it is both accurate and complete.       Electronically signed by Yolanda Grant DNP, MARY, 01/24/24, 8:01 AM CST.

## 2024-01-26 DIAGNOSIS — J06.9 UPPER RESPIRATORY TRACT INFECTION, UNSPECIFIED TYPE: Primary | ICD-10-CM

## 2024-01-26 RX ORDER — CEPHALEXIN 500 MG/1
500 CAPSULE ORAL 2 TIMES DAILY
Qty: 20 CAPSULE | Refills: 0 | Status: SHIPPED | OUTPATIENT
Start: 2024-01-26 | End: 2024-02-05

## 2024-02-01 ENCOUNTER — OFFICE VISIT (OUTPATIENT)
Dept: FAMILY MEDICINE CLINIC | Facility: CLINIC | Age: 69
End: 2024-02-01
Payer: MEDICARE

## 2024-02-01 ENCOUNTER — HOSPITAL ENCOUNTER (OUTPATIENT)
Dept: CT IMAGING | Facility: HOSPITAL | Age: 69
Discharge: HOME | End: 2024-02-01
Admitting: NURSE PRACTITIONER
Payer: MEDICARE

## 2024-02-01 VITALS
HEIGHT: 68 IN | TEMPERATURE: 97.5 F | OXYGEN SATURATION: 99 % | WEIGHT: 160 LBS | BODY MASS INDEX: 24.25 KG/M2 | DIASTOLIC BLOOD PRESSURE: 72 MMHG | RESPIRATION RATE: 18 BRPM | HEART RATE: 71 BPM | SYSTOLIC BLOOD PRESSURE: 109 MMHG

## 2024-02-01 DIAGNOSIS — R05.3 PERSISTENT COUGH: ICD-10-CM

## 2024-02-01 DIAGNOSIS — R91.1 NODULE OF RIGHT LUNG: Primary | ICD-10-CM

## 2024-02-01 DIAGNOSIS — J18.9 COMMUNITY ACQUIRED PNEUMONIA OF RIGHT MIDDLE LOBE OF LUNG: ICD-10-CM

## 2024-02-01 DIAGNOSIS — R93.89 ABNORMAL CHEST X-RAY: ICD-10-CM

## 2024-02-01 DIAGNOSIS — R91.1 NODULE OF RIGHT LUNG: ICD-10-CM

## 2024-02-01 DIAGNOSIS — J40 BRONCHITIS: Primary | ICD-10-CM

## 2024-02-01 PROCEDURE — 3078F DIAST BP <80 MM HG: CPT | Performed by: NURSE PRACTITIONER

## 2024-02-01 PROCEDURE — 25510000001 IOPAMIDOL 61 % SOLUTION: Performed by: NURSE PRACTITIONER

## 2024-02-01 PROCEDURE — 71260 CT THORAX DX C+: CPT

## 2024-02-01 PROCEDURE — 3074F SYST BP LT 130 MM HG: CPT | Performed by: NURSE PRACTITIONER

## 2024-02-01 PROCEDURE — 99214 OFFICE O/P EST MOD 30 MIN: CPT | Performed by: NURSE PRACTITIONER

## 2024-02-01 RX ORDER — FLUTICASONE PROPIONATE 110 UG/1
1 AEROSOL, METERED RESPIRATORY (INHALATION)
Qty: 12 G | Refills: 0 | Status: SHIPPED | OUTPATIENT
Start: 2024-02-01

## 2024-02-01 RX ORDER — DOXYCYCLINE HYCLATE 100 MG/1
100 CAPSULE ORAL 2 TIMES DAILY
Qty: 14 CAPSULE | Refills: 0 | Status: SHIPPED | OUTPATIENT
Start: 2024-02-01 | End: 2024-02-08

## 2024-02-01 RX ORDER — CEFDINIR 300 MG/1
300 CAPSULE ORAL 2 TIMES DAILY
Qty: 14 CAPSULE | Refills: 0 | Status: SHIPPED | OUTPATIENT
Start: 2024-02-01 | End: 2024-02-13

## 2024-02-01 RX ORDER — ALBUTEROL SULFATE 2.5 MG/3ML
2.5 SOLUTION RESPIRATORY (INHALATION) EVERY 4 HOURS PRN
Qty: 60 ML | Refills: 2 | Status: SHIPPED | OUTPATIENT
Start: 2024-02-01

## 2024-02-01 RX ORDER — METHYLPREDNISOLONE 4 MG/1
TABLET ORAL
Qty: 21 TABLET | Refills: 0 | Status: SHIPPED | OUTPATIENT
Start: 2024-02-01

## 2024-02-01 RX ADMIN — IOPAMIDOL 100 ML: 612 INJECTION, SOLUTION INTRAVENOUS at 13:38

## 2024-02-13 ENCOUNTER — OFFICE VISIT (OUTPATIENT)
Dept: FAMILY MEDICINE CLINIC | Facility: CLINIC | Age: 69
End: 2024-02-13
Payer: MEDICARE

## 2024-02-13 VITALS
OXYGEN SATURATION: 98 % | HEART RATE: 66 BPM | SYSTOLIC BLOOD PRESSURE: 120 MMHG | BODY MASS INDEX: 24.25 KG/M2 | HEIGHT: 68 IN | WEIGHT: 160 LBS | DIASTOLIC BLOOD PRESSURE: 76 MMHG | TEMPERATURE: 97.6 F | RESPIRATION RATE: 18 BRPM

## 2024-02-13 DIAGNOSIS — J40 BRONCHITIS: Primary | ICD-10-CM

## 2024-02-13 PROCEDURE — 3078F DIAST BP <80 MM HG: CPT | Performed by: FAMILY MEDICINE

## 2024-02-13 PROCEDURE — 99214 OFFICE O/P EST MOD 30 MIN: CPT | Performed by: FAMILY MEDICINE

## 2024-02-13 PROCEDURE — 3074F SYST BP LT 130 MM HG: CPT | Performed by: FAMILY MEDICINE

## 2024-02-13 RX ORDER — CEPHALEXIN 250 MG/1
250 CAPSULE ORAL DAILY
COMMUNITY

## 2024-02-13 RX ORDER — BENZONATATE 200 MG/1
200 CAPSULE ORAL 3 TIMES DAILY PRN
Qty: 30 CAPSULE | Refills: 0 | Status: SHIPPED | OUTPATIENT
Start: 2024-02-13

## 2024-02-13 NOTE — PROGRESS NOTES
"Subjective   Ave Singh is a 68 y.o. female.     Cough       Ave Singh is a 68-year-old [facility].    Patient was initially seen in office on 01/23/2024 by Yolanda; she was diagnosed with bronchitis. She was given a steroid injection as well as some cough medication; she does not feel like this has helped significantly. She returned to office for reevaluation on 02/01/2024; at that time, she was seen by Halle and she had a chest x-ray completed, she was given an oral steroid pack, as well as cefdinir and doxycycline. Patient has now completed her steroid and antibiotics course. Her chest x-ray was found to have a nodular opacity and was followed up with a CT scan; this was felt to be an area of inflammatory change, less likely to be a malignancy. The patient was recommended to repeat the CT scan in 3 months for reevaluation. Patient reports that she did have COVID-19 around 12/25/2023, however, this was a very mild illness. She was concerned because she continues to have a dry nonproductive cough. She reports that she is not short of breath. She does not seem to be wheezing. She is not having any sputum production. The patient denies fever, chills, chest pain or chest tightness. She has tried the nebulizer solution, however, she feels that this just makes her cough and does not know that it is of benefit to her. She did question if she should continue taking Mucinex and wanted reevaluation, since she continues to cough.   .  The following portions of the patient's history were reviewed and updated as appropriate: allergies, current medications, past family history, past medical history, past social history, past surgical history, and problem list.        Review of Systems   Respiratory:  Positive for cough.        Objective   Blood pressure 120/76, pulse 66, temperature 97.6 °F (36.4 °C), temperature source Infrared, resp. rate 18, height 172.7 cm (68\"), " weight 72.6 kg (160 lb), SpO2 98%, not currently breastfeeding.  Physical Exam  Vitals and nursing note reviewed.   Constitutional:       General: She is not in acute distress.     Appearance: She is well-developed. She is not diaphoretic.   HENT:      Head: Normocephalic and atraumatic.      Right Ear: External ear normal.      Left Ear: External ear normal.      Nose: Nose normal.   Eyes:      General:         Right eye: No discharge.         Left eye: No discharge.      Conjunctiva/sclera: Conjunctivae normal.   Neck:      Thyroid: No thyromegaly.      Trachea: No tracheal deviation.   Cardiovascular:      Rate and Rhythm: Normal rate and regular rhythm.      Heart sounds: Normal heart sounds.   Pulmonary:      Effort: Pulmonary effort is normal. No respiratory distress.      Breath sounds: Normal breath sounds. No stridor. No wheezing.   Chest:      Chest wall: No tenderness.   Musculoskeletal:         General: Normal range of motion.      Cervical back: Normal range of motion.   Lymphadenopathy:      Cervical: No cervical adenopathy.   Skin:     General: Skin is warm and dry.   Neurological:      Mental Status: She is alert and oriented to person, place, and time.      Motor: No abnormal muscle tone.      Coordination: Coordination normal.   Psychiatric:         Behavior: Behavior normal.         Thought Content: Thought content normal.         Judgment: Judgment normal.         BMI is within normal parameters. No other follow-up for BMI required.       Assessment & Plan   Problems Addressed this Visit    None  Visit Diagnoses       Bronchitis    -  Primary    Relevant Medications    benzonatate (TESSALON) 200 MG capsule    Other Relevant Orders    XR Chest PA & Lateral (In Office)          Diagnoses         Codes Comments    Bronchitis    -  Primary ICD-10-CM: J40  ICD-9-CM: 490           1. Bronchitis        - Advised the patient that her vital signs are normal at this time. She is afebrile. She has not been  running a fever at home. She has normal oxygen saturations on room air. Her lungs are clear to auscultation without any wheezing. We reviewed her chest x-ray and chest CT scan. I advised the patient that we will repeat a chest x-ray while in office today for reevaluation. I agree with repeating her CT scan in 3 months for reevaluation. The patient was advised that I do not feel any further antibiotics are needed at this time. Advised I do not feel any additional steroids are needed at this time. She is not wheezing, therefore, I do not feel that she has to continue on nebulizer treatments. We will give her an alternative medication to try for cough to see if it would ease her symptoms. Advised the patient that I suspect this is a post-viral cough that is lingering. We will notify her of her chest x-ray results when available later today and contact the office if not seeing some impropvement by the end of the week,         Transcribed from ambient dictation for Tori Preciado MD by Tori Flores.  02/13/24   08:50 CST    Patient or patient representative verbalized consent to the visit recording.  I have personally performed the services described in this document as transcribed by the above individual, and it is both accurate and complete.          This document has been electronically signed by Tori Preciado MD on February 19, 2024 16:21 CST

## 2024-02-19 LAB — CREAT BLDA-MCNC: 1.6 MG/DL (ref 0.6–1.3)

## 2024-02-26 ENCOUNTER — TELEPHONE (OUTPATIENT)
Dept: FAMILY MEDICINE CLINIC | Facility: CLINIC | Age: 69
End: 2024-02-26

## 2024-02-26 NOTE — TELEPHONE ENCOUNTER
PATIENT REQUESTING AN ORDER FOR LABS BE PUT IN BEFORE APPOINTMENT ON 3/6/24 SO THAT PROVIDER WILL HAVE RESULTS TO GO OVER ON APPOINTMENT DATE     GOOD CALL BACK ONCE ORDERS HAVE BEEN SENT     [phone]

## 2024-02-27 NOTE — TELEPHONE ENCOUNTER
Pt requesting labs prior to her apt on 3/6/2024 for 6 month follow up.   Pt will be due for repeat A1c and lipid only, however Medicare will not pay for lipid unless it has been 6 months (A1c 3 months).  6 months from last lipid lab date would be 3/12/24 or after.   My initial thought was to push pt's apt out after 6 month date, but pt will be out of medication on 3/12/2024 and uses mail order pharmacy, so there would be a delay in receiving medication.   If we sent meds early there is a chance that lab result could indicate a change in medication.   Not sure what they best approach is, other than to have pt complete labs after apt?  Please advise

## 2024-02-28 DIAGNOSIS — R73.01 IMPAIRED FASTING GLUCOSE: ICD-10-CM

## 2024-02-28 DIAGNOSIS — E55.9 VITAMIN D DEFICIENCY: ICD-10-CM

## 2024-02-28 DIAGNOSIS — E78.2 MIXED HYPERLIPIDEMIA: Chronic | ICD-10-CM

## 2024-02-28 DIAGNOSIS — I10 PRIMARY HYPERTENSION: Primary | Chronic | ICD-10-CM

## 2024-03-01 DIAGNOSIS — E78.2 MIXED HYPERLIPIDEMIA: ICD-10-CM

## 2024-03-01 DIAGNOSIS — E11.65 TYPE 2 DIABETES MELLITUS WITH HYPERGLYCEMIA, WITHOUT LONG-TERM CURRENT USE OF INSULIN: ICD-10-CM

## 2024-03-01 DIAGNOSIS — N28.9 ABNORMAL KIDNEY FUNCTION: Primary | ICD-10-CM

## 2024-03-01 LAB
25(OH)D3+25(OH)D2 SERPL-MCNC: 60.7 NG/ML (ref 30–100)
ALBUMIN SERPL-MCNC: 4.4 G/DL (ref 3.5–5.2)
ALBUMIN/GLOB SERPL: 2 G/DL
ALP SERPL-CCNC: 39 U/L (ref 39–117)
ALT SERPL-CCNC: 20 U/L (ref 1–33)
AST SERPL-CCNC: 20 U/L (ref 1–32)
BILIRUB SERPL-MCNC: 0.5 MG/DL (ref 0–1.2)
BUN SERPL-MCNC: 26 MG/DL (ref 8–23)
BUN/CREAT SERPL: 21 (ref 7–25)
CALCIUM SERPL-MCNC: 10.3 MG/DL (ref 8.6–10.5)
CHLORIDE SERPL-SCNC: 102 MMOL/L (ref 98–107)
CHOLEST SERPL-MCNC: 133 MG/DL (ref 0–200)
CO2 SERPL-SCNC: 25.3 MMOL/L (ref 22–29)
CREAT SERPL-MCNC: 1.24 MG/DL (ref 0.57–1)
EGFRCR SERPLBLD CKD-EPI 2021: 47.2 ML/MIN/1.73
ERYTHROCYTE [DISTWIDTH] IN BLOOD BY AUTOMATED COUNT: 12.4 % (ref 12.3–15.4)
GLOBULIN SER CALC-MCNC: 2.2 GM/DL
GLUCOSE SERPL-MCNC: 125 MG/DL (ref 65–99)
HBA1C MFR BLD: 5.9 % (ref 4.8–5.6)
HCT VFR BLD AUTO: 38.4 % (ref 34–46.6)
HDLC SERPL-MCNC: 44 MG/DL (ref 40–60)
HGB BLD-MCNC: 12.6 G/DL (ref 12–15.9)
LDLC SERPL CALC-MCNC: 62 MG/DL (ref 0–100)
MCH RBC QN AUTO: 28.4 PG (ref 26.6–33)
MCHC RBC AUTO-ENTMCNC: 32.8 G/DL (ref 31.5–35.7)
MCV RBC AUTO: 86.7 FL (ref 79–97)
PLATELET # BLD AUTO: 263 10*3/MM3 (ref 140–450)
POTASSIUM SERPL-SCNC: 4.1 MMOL/L (ref 3.5–5.2)
PROT SERPL-MCNC: 6.6 G/DL (ref 6–8.5)
RBC # BLD AUTO: 4.43 10*6/MM3 (ref 3.77–5.28)
SODIUM SERPL-SCNC: 140 MMOL/L (ref 136–145)
TRIGL SERPL-MCNC: 161 MG/DL (ref 0–150)
VLDLC SERPL CALC-MCNC: 27 MG/DL (ref 5–40)
WBC # BLD AUTO: 4.87 10*3/MM3 (ref 3.4–10.8)

## 2024-03-06 ENCOUNTER — OFFICE VISIT (OUTPATIENT)
Dept: FAMILY MEDICINE CLINIC | Facility: CLINIC | Age: 69
End: 2024-03-06
Payer: MEDICARE

## 2024-03-06 VITALS
WEIGHT: 158 LBS | HEIGHT: 68 IN | RESPIRATION RATE: 20 BRPM | BODY MASS INDEX: 23.95 KG/M2 | TEMPERATURE: 98.8 F | DIASTOLIC BLOOD PRESSURE: 70 MMHG | HEART RATE: 67 BPM | OXYGEN SATURATION: 99 % | SYSTOLIC BLOOD PRESSURE: 114 MMHG

## 2024-03-06 DIAGNOSIS — I10 ESSENTIAL HYPERTENSION: Chronic | ICD-10-CM

## 2024-03-06 DIAGNOSIS — R09.81 NASAL CONGESTION: ICD-10-CM

## 2024-03-06 DIAGNOSIS — J40 BRONCHITIS: ICD-10-CM

## 2024-03-06 DIAGNOSIS — E11.65 CONTROLLED TYPE 2 DIABETES MELLITUS WITH HYPERGLYCEMIA, WITHOUT LONG-TERM CURRENT USE OF INSULIN: Primary | ICD-10-CM

## 2024-03-06 DIAGNOSIS — E78.2 MIXED HYPERLIPIDEMIA: ICD-10-CM

## 2024-03-06 PROCEDURE — 99214 OFFICE O/P EST MOD 30 MIN: CPT | Performed by: NURSE PRACTITIONER

## 2024-03-06 PROCEDURE — 3078F DIAST BP <80 MM HG: CPT | Performed by: NURSE PRACTITIONER

## 2024-03-06 PROCEDURE — 1159F MED LIST DOCD IN RCRD: CPT | Performed by: NURSE PRACTITIONER

## 2024-03-06 PROCEDURE — 3074F SYST BP LT 130 MM HG: CPT | Performed by: NURSE PRACTITIONER

## 2024-03-06 PROCEDURE — 1160F RVW MEDS BY RX/DR IN RCRD: CPT | Performed by: NURSE PRACTITIONER

## 2024-03-06 PROCEDURE — 3044F HG A1C LEVEL LT 7.0%: CPT | Performed by: NURSE PRACTITIONER

## 2024-03-06 RX ORDER — DEXTROMETHORPHAN HYDROBROMIDE AND PROMETHAZINE HYDROCHLORIDE 15; 6.25 MG/5ML; MG/5ML
5 SYRUP ORAL 4 TIMES DAILY PRN
Qty: 120 ML | Refills: 0 | Status: SHIPPED | OUTPATIENT
Start: 2024-03-06

## 2024-03-06 RX ORDER — PREDNISONE 20 MG/1
20 TABLET ORAL DAILY
Qty: 5 TABLET | Refills: 0 | Status: SHIPPED | OUTPATIENT
Start: 2024-03-06 | End: 2024-03-11

## 2024-03-06 RX ORDER — FLUTICASONE PROPIONATE 50 MCG
2 SPRAY, SUSPENSION (ML) NASAL DAILY PRN
Qty: 9.9 ML | Refills: 5 | Status: SHIPPED | OUTPATIENT
Start: 2024-03-06

## 2024-03-06 RX ORDER — CEPHALEXIN 500 MG/1
500 CAPSULE ORAL 2 TIMES DAILY
Qty: 14 CAPSULE | Refills: 0 | Status: SHIPPED | OUTPATIENT
Start: 2024-03-06 | End: 2024-03-13

## 2024-03-06 RX ORDER — METOPROLOL SUCCINATE 100 MG/1
100 TABLET, EXTENDED RELEASE ORAL DAILY
Qty: 90 TABLET | Refills: 1 | Status: SHIPPED | OUTPATIENT
Start: 2024-03-06

## 2024-03-06 RX ORDER — LOVASTATIN 40 MG/1
40 TABLET ORAL NIGHTLY
Qty: 90 TABLET | Refills: 1 | Status: SHIPPED | OUTPATIENT
Start: 2024-03-06

## 2024-03-06 RX ORDER — FENOFIBRATE 145 MG/1
145 TABLET, COATED ORAL DAILY
Qty: 90 TABLET | Refills: 1 | Status: SHIPPED | OUTPATIENT
Start: 2024-03-06

## 2024-03-06 RX ORDER — LOSARTAN POTASSIUM AND HYDROCHLOROTHIAZIDE 25; 100 MG/1; MG/1
1 TABLET ORAL DAILY
Qty: 90 TABLET | Refills: 1 | Status: SHIPPED | OUTPATIENT
Start: 2024-03-06 | End: 2025-03-06

## 2024-03-06 NOTE — PROGRESS NOTES
Chief Complaint  Hypertension (Patient is here for a follow up for hypertension)    Subjective        Ave Singh presents to [facility]  History of Present IllnessThe patient is a 68-year-old female who is here for a chronic visit; however, she is also sick, so we will do both.    She is here for chronic hypertension. She has had hypertension for more than 1 year. Blood pressure is controlled today. The reading is 114/70 mmHg. She denies any chest pain, shortness of breath, or palpitations. She denies any inflammation of her ankles. She denies any blurred vision or headaches. She does not have any amphetamines, aerobics, or thyroid hormone bones associated with hypertension. She does not have any associated agents with hypertension, CAD, diabetes mellitus, dyslipidemia, family history, postmenopausal. Past treatments have been an ACE and ARB and a diuretic. Current treatment is losartan HCTZ. She has made improvement. No complications. No compliance problems. No kidney disease. No heart failure. No CVA. Hyperlipidemia is chronic. She has had hyperlipidemia for more than 1 year. The last lipid panel showed it was elevated. Exacerbating diseases are going to be diabetes mellitus. Hyperlipidemia is not aggravated by anything. Currently on fenofibrate, lovastatin, and metoprolol.    She denies any increased hunger, increased thirst, or increased urination. She denies any hypoglycemic episodes. She denies any dizziness, nervousness, confusion, or being hospitalized. Her last eye exam was in 01/2024. She is on metformin.    She denies any leg cramps or knee cramps. She is on OTC vitamin D.    She denies any fevers or chills. She coughs at night until she vomits. In the past month, she has been vomiting every other day. The Bromfed did help her in the beginning. She usually uses Phenergan. Her nasal discharge is clear.    She has been sick for a couple of weeks with a cough and  "congestion. She feels like it is sitting right up in the upper part of her chest into the neck. She cannot seem to get rid of it. We are going to give her an antibiotic for that. We are going to do a higher dose of prednisone 20 mg daily for 5 days. We are going to give her some Phenergan cough syrup. We are going to start her on Keflex twice daily for 7 days.      The following portions of the patient's history were reviewed and updated as appropriate: allergies, current medications, past family history, past medical history, past social history, past surgical history and problem list.    BMI is within normal parameters. No other follow-up for BMI required.      Objective   Vital Signs:  /70 (BP Location: Left arm, Patient Position: Sitting, Cuff Size: Adult)   Pulse 67   Temp 98.8 °F (37.1 °C)   Resp 20   Ht 172.7 cm (68.01\")   Wt 71.7 kg (158 lb)   SpO2 99%   BMI 24.02 kg/m²   Estimated body mass index is 24.02 kg/m² as calculated from the following:    Height as of this encounter: 172.7 cm (68.01\").    Weight as of this encounter: 71.7 kg (158 lb).       Physical Exam  Vitals and nursing note reviewed.   Constitutional:       General: She is awake.      Appearance: Normal appearance. She is well-developed and well-groomed.   HENT:      Head: Normocephalic and atraumatic.      Right Ear: Hearing, tympanic membrane, ear canal and external ear normal.      Left Ear: Hearing, tympanic membrane, ear canal and external ear normal.      Nose: Nose normal.      Mouth/Throat:      Lips: Pink.      Pharynx: Oropharynx is clear.   Eyes:      General: Lids are normal.      Conjunctiva/sclera: Conjunctivae normal.   Cardiovascular:      Rate and Rhythm: Normal rate and regular rhythm.      Heart sounds: Normal heart sounds.   Pulmonary:      Effort: Pulmonary effort is normal.      Breath sounds: Normal breath sounds and air entry.   Musculoskeletal:      Cervical back: Full passive range of motion without pain. "      Right lower leg: No edema.      Left lower leg: No edema.      Right foot: Normal range of motion. No deformity, bunion, Charcot foot, foot drop or prominent metatarsal heads.      Left foot: Normal range of motion. No deformity, bunion, Charcot foot, foot drop or prominent metatarsal heads.   Feet:      Right foot:      Protective Sensation: 3 sites tested.  3 sites sensed.      Skin integrity: Skin integrity normal.      Toenail Condition: Right toenails are normal.      Left foot:      Protective Sensation: 3 sites tested.  3 sites sensed.      Skin integrity: Skin integrity normal.      Toenail Condition: Left toenails are normal.   Lymphadenopathy:      Head:      Right side of head: No submental, submandibular or tonsillar adenopathy.      Left side of head: No submental, submandibular or tonsillar adenopathy.   Skin:     General: Skin is warm and dry.   Neurological:      Mental Status: She is alert and oriented to person, place, and time.      Sensory: Sensation is intact.      Motor: Motor function is intact.      Coordination: Coordination is intact.      Gait: Gait is intact.   Psychiatric:         Attention and Perception: Attention and perception normal.         Mood and Affect: Mood and affect normal.         Speech: Speech normal.         Behavior: Behavior normal. Behavior is cooperative.         Thought Content: Thought content normal.         Cognition and Memory: Cognition and memory normal.         Judgment: Judgment normal.        Result Review :  Laboratory Studies  Labs were reviewed with the patient.  The following data was reviewed by: Yolanda Grant DNP, APRN on 03/06/2024:        Lab Results   Component Value Date    WBC 4.87 02/29/2024    HGB 12.6 02/29/2024    HCT 38.4 02/29/2024    MCV 86.7 02/29/2024     02/29/2024     Lab Results   Component Value Date    GLUCOSE 125 (H) 02/29/2024    BUN 26 (H) 02/29/2024    CREATININE 1.24 (H) 02/29/2024    EGFRRESULT 47.2 (L)  02/29/2024    BCR 21.0 02/29/2024    K 4.1 02/29/2024    CO2 25.3 02/29/2024    CALCIUM 10.3 02/29/2024    PROTENTOTREF 6.6 02/29/2024    ALBUMIN 4.4 02/29/2024    BILITOT 0.5 02/29/2024    AST 20 02/29/2024    ALT 20 02/29/2024     Lab Results   Component Value Date    HGBA1C 5.90 (H) 02/29/2024     Lab Results   Component Value Date    CHLPL 133 02/29/2024    TRIG 161 (H) 02/29/2024    HDL 44 02/29/2024    LDL 62 02/29/2024     All lab results discussed with pt during office visit          Assessment and Plan     Diagnoses and all orders for this visit:    1. Controlled type 2 diabetes mellitus with hyperglycemia, without long-term current use of insulin (Primary)  -     Microalbumin / Creatinine Urine Ratio - Urine, Clean Catch  -     metFORMIN (Glucophage) 500 MG tablet; Take 1 tablet by mouth 2 (Two) Times a Day With Meals.  Dispense: 180 tablet; Refill: 1    2. Essential hypertension  -     lovastatin (MEVACOR) 40 MG tablet; Take 1 tablet by mouth Every Night. Delete existing refills.  Dispense: 90 tablet; Refill: 1  -     losartan-hydrochlorothiazide (Hyzaar) 100-25 MG per tablet; Take 1 tablet by mouth Daily. Delete existing refills.  Dispense: 90 tablet; Refill: 1  -     metoprolol succinate XL (Toprol XL) 100 MG 24 hr tablet; Take 1 tablet by mouth Daily. Delete existing refills.  Dispense: 90 tablet; Refill: 1    3. Bronchitis  -     cephalexin (Keflex) 500 MG capsule; Take 1 capsule by mouth 2 (Two) Times a Day for 7 days.  Dispense: 14 capsule; Refill: 0  -     predniSONE (DELTASONE) 20 MG tablet; Take 1 tablet by mouth Daily for 5 days.  Dispense: 5 tablet; Refill: 0  -     promethazine-dextromethorphan (PROMETHAZINE-DM) 6.25-15 MG/5ML syrup; Take 5 mL by mouth 4 (Four) Times a Day As Needed for Cough.  Dispense: 120 mL; Refill: 0    4. Mixed hyperlipidemia  -     fenofibrate (TRICOR) 145 MG tablet; Take 1 tablet by mouth Daily.  Dispense: 90 tablet; Refill: 1    5. Nasal congestion  -     fluticasone  (FLONASE) 50 MCG/ACT nasal spray; 2 sprays into the nostril(s) as directed by provider Daily As Needed for Rhinitis or Allergies.  Dispense: 9.9 mL; Refill: 5             Follow Up     Return in about 6 months (around 9/6/2024).  Patient was given instructions and counseling regarding her condition or for health maintenance advice. Please see specific information pulled into the AVS if appropriate.     Transcribed from ambient dictation for Yolanda Grant DNP, MARY by Kim Sears.  03/06/24   10:13 CST    Patient or patient representative verbalized consent to the visit recording.  I have personally performed the services described in this document as transcribed by the above individual, and it is both accurate and complete.     Electronically signed by Yolanda Grant DNP, MARY, 03/06/24, 1:10 PM CST.

## 2024-03-06 NOTE — PROGRESS NOTES
"Chief Complaint  Hypertension (Patient is here for a follow up for hypertension)    Subjective    {Problem List  Visit Diagnosis   Encounters  Notes  Medications  Labs  Result Review Imaging  Media :23}    Ave Singh presents to [facility]  History of Present Illness      The following portions of the patient's history were reviewed and updated as appropriate: allergies, current medications, past family history, past medical history, past social history, past surgical history and problem list.    BMI is within normal parameters. No other follow-up for BMI required.      Objective   Vital Signs:  /70 (BP Location: Left arm, Patient Position: Sitting, Cuff Size: Adult)   Pulse 67   Temp 98.8 °F (37.1 °C)   Resp 20   Ht 172.7 cm (68.01\")   Wt 71.7 kg (158 lb)   SpO2 99%   BMI 24.02 kg/m²   Estimated body mass index is 24.02 kg/m² as calculated from the following:    Height as of this encounter: 172.7 cm (68.01\").    Weight as of this encounter: 71.7 kg (158 lb).       Physical Exam  Vitals and nursing note reviewed.   Constitutional:       General: She is awake.      Appearance: Normal appearance. She is well-developed and well-groomed.   HENT:      Head: Normocephalic and atraumatic.      Right Ear: Hearing, tympanic membrane, ear canal and external ear normal.      Left Ear: Hearing, tympanic membrane, ear canal and external ear normal.      Nose: Nose normal.      Mouth/Throat:      Lips: Pink.      Pharynx: Oropharynx is clear.   Eyes:      General: Lids are normal.      Conjunctiva/sclera: Conjunctivae normal.   Cardiovascular:      Rate and Rhythm: Normal rate and regular rhythm.      Heart sounds: Normal heart sounds.   Pulmonary:      Effort: Pulmonary effort is normal.      Breath sounds: Normal breath sounds and air entry.   Musculoskeletal:      Cervical back: Full passive range of motion without pain.      Right lower leg: No edema.      Left " lower leg: No edema.      Right foot: Normal range of motion. No deformity, bunion, Charcot foot, foot drop or prominent metatarsal heads.      Left foot: Normal range of motion. No deformity, bunion, Charcot foot, foot drop or prominent metatarsal heads.   Feet:      Right foot:      Protective Sensation: 3 sites tested.  3 sites sensed.      Skin integrity: Skin integrity normal.      Toenail Condition: Right toenails are normal.      Left foot:      Protective Sensation: 3 sites tested.  3 sites sensed.      Skin integrity: Skin integrity normal.      Toenail Condition: Left toenails are normal.   Lymphadenopathy:      Head:      Right side of head: No submental, submandibular or tonsillar adenopathy.      Left side of head: No submental, submandibular or tonsillar adenopathy.   Skin:     General: Skin is warm and dry.   Neurological:      Mental Status: She is alert and oriented to person, place, and time.      Sensory: Sensation is intact.      Motor: Motor function is intact.      Coordination: Coordination is intact.      Gait: Gait is intact.   Psychiatric:         Attention and Perception: Attention and perception normal.         Mood and Affect: Mood and affect normal.         Speech: Speech normal.         Behavior: Behavior normal. Behavior is cooperative.         Thought Content: Thought content normal.         Cognition and Memory: Cognition and memory normal.         Judgment: Judgment normal.      Result Review :{Labs  Result Review  Imaging  Med Tab  Media  Procedures :23}    {The following data was reviewed by (Optional):19937}  {Ambulatory Labs (Optional):76967}  {Data reviewed (Optional):58983:::1}             Assessment and Plan {CC Problem List  Visit Diagnosis   ROS  Review (Popup)  Health Maintenance  Quality  BestPractice  Medications  SmartSets  SnapShot Encounters  Media :23}    Diagnoses and all orders for this visit:    1. Controlled type 2 diabetes mellitus with  hyperglycemia, without long-term current use of insulin (Primary)  -     Microalbumin / Creatinine Urine Ratio - Urine, Clean Catch    2. Bronchitis  -     cephalexin (Keflex) 500 MG capsule; Take 1 capsule by mouth 2 (Two) Times a Day for 7 days.  Dispense: 14 capsule; Refill: 0  -     predniSONE (DELTASONE) 20 MG tablet; Take 1 tablet by mouth Daily for 5 days.  Dispense: 5 tablet; Refill: 0  -     promethazine-dextromethorphan (PROMETHAZINE-DM) 6.25-15 MG/5ML syrup; Take 5 mL by mouth 4 (Four) Times a Day As Needed for Cough.  Dispense: 120 mL; Refill: 0    3. Pre-diabetes  -     metFORMIN (Glucophage) 500 MG tablet; Take 1 tablet by mouth 2 (Two) Times a Day With Meals.  Dispense: 180 tablet; Refill: 1    4. Mixed hyperlipidemia  -     fenofibrate (TRICOR) 145 MG tablet; Take 1 tablet by mouth Daily.  Dispense: 90 tablet; Refill: 1    5. Essential hypertension  -     lovastatin (MEVACOR) 40 MG tablet; Take 1 tablet by mouth Every Night. Delete existing refills.  Dispense: 90 tablet; Refill: 1  -     losartan-hydrochlorothiazide (Hyzaar) 100-25 MG per tablet; Take 1 tablet by mouth Daily. Delete existing refills.  Dispense: 90 tablet; Refill: 1  -     metoprolol succinate XL (Toprol XL) 100 MG 24 hr tablet; Take 1 tablet by mouth Daily. Delete existing refills.  Dispense: 90 tablet; Refill: 1    6. Nasal congestion  -     fluticasone (FLONASE) 50 MCG/ACT nasal spray; 2 sprays into the nostril(s) as directed by provider Daily As Needed for Rhinitis or Allergies.  Dispense: 9.9 mL; Refill: 5           {Time Spent (Optional):20161}  Follow Up {Instructions Charge Capture  Follow-up Communications :23}    Return in about 6 months (around 9/6/2024).  Patient was given instructions and counseling regarding her condition or for health maintenance advice. Please see specific information pulled into the AVS if appropriate.

## 2024-03-08 LAB
ALBUMIN/CREAT UR: 6 MG/G CREAT (ref 0–29)
CREAT UR-MCNC: 91.4 MG/DL
MICROALBUMIN UR-MCNC: 5.1 UG/ML

## 2024-04-29 ENCOUNTER — TRANSCRIBE ORDERS (OUTPATIENT)
Dept: ADMINISTRATIVE | Facility: HOSPITAL | Age: 69
End: 2024-04-29
Payer: MEDICARE

## 2024-04-29 DIAGNOSIS — N18.31 CHRONIC KIDNEY DISEASE, STAGE 3A: Primary | ICD-10-CM

## 2024-05-02 ENCOUNTER — HOSPITAL ENCOUNTER (OUTPATIENT)
Dept: CT IMAGING | Facility: HOSPITAL | Age: 69
Discharge: HOME | End: 2024-05-02
Admitting: NURSE PRACTITIONER
Payer: MEDICARE

## 2024-05-02 DIAGNOSIS — J18.9 COMMUNITY ACQUIRED PNEUMONIA OF RIGHT MIDDLE LOBE OF LUNG: ICD-10-CM

## 2024-05-02 DIAGNOSIS — R91.1 NODULE OF RIGHT LUNG: ICD-10-CM

## 2024-05-02 LAB — CREAT BLDA-MCNC: 1.1 MG/DL (ref 0.6–1.3)

## 2024-05-02 PROCEDURE — 82565 ASSAY OF CREATININE: CPT

## 2024-05-02 PROCEDURE — 71260 CT THORAX DX C+: CPT

## 2024-05-02 PROCEDURE — 25510000001 IOPAMIDOL 61 % SOLUTION: Performed by: NURSE PRACTITIONER

## 2024-05-02 RX ADMIN — IOPAMIDOL 100 ML: 612 INJECTION, SOLUTION INTRAVENOUS at 11:02

## 2024-05-05 PROBLEM — N18.31 STAGE 3A CHRONIC KIDNEY DISEASE: Status: ACTIVE | Noted: 2024-05-05

## 2024-05-05 PROBLEM — I12.9 CHRONIC KIDNEY DISEASE DUE TO HYPERTENSION: Status: ACTIVE | Noted: 2024-05-05

## 2024-05-07 ENCOUNTER — TELEPHONE (OUTPATIENT)
Age: 69
End: 2024-05-07

## 2024-05-07 ENCOUNTER — HOSPITAL ENCOUNTER (OUTPATIENT)
Dept: ULTRASOUND IMAGING | Facility: HOSPITAL | Age: 69
Discharge: HOME | End: 2024-05-07
Admitting: INTERNAL MEDICINE
Payer: MEDICARE

## 2024-05-07 DIAGNOSIS — N18.31 CHRONIC KIDNEY DISEASE, STAGE 3A: ICD-10-CM

## 2024-05-07 PROCEDURE — 76775 US EXAM ABDO BACK WALL LIM: CPT

## 2024-05-07 NOTE — TELEPHONE ENCOUNTER
Provider: DR. PAPI SOLIMAN    [facility] received a fax that requires your attention. The document has been indexed to the patient's chart for your review.     Reason for sending: REVIEW     Documents Description: MED RECS REQUEST     [facility]     Date Indexed: 05/07/24     Notes (if needed): INDEXED AS EXT MED RECS 05/02/24, ALSO INDEXED DEMO 5/2/24, AND MED RECS RELEASED SIGNED 5/16/23 NOW IN CHART.  
No

## 2024-05-08 DIAGNOSIS — E27.8 LEFT ADRENAL MASS: Primary | ICD-10-CM

## 2024-06-12 DIAGNOSIS — B00.9 HERPES SIMPLEX: ICD-10-CM

## 2024-06-12 NOTE — TELEPHONE ENCOUNTER
Caller: Ave Singh    Relationship: Self    Best call back number: [phone]     Requested Prescriptions:   Requested Prescriptions     Pending Prescriptions Disp Refills    valACYclovir (Valtrex) 1000 MG tablet 30 tablet 2     Sig: Use 2 tablets twice a day x1 day with outbreaks        Pharmacy where request should be sent: [facility] [address] [facility] [phone] [facility] [phone] [facility]     Last office visit with prescribing clinician: 3/6/2024   Last telemedicine visit with prescribing clinician: Visit date not found   Next office visit with prescribing clinician: 9/17/2024     Additional details provided by patient:     Does the patient have less than a 3 day supply:  [x] Yes  [] No    Would you like a call back once the refill request has been completed: [x] Yes [] No      Goyo Lay Rep   06/12/24 08:30 CDT

## 2024-06-12 NOTE — TELEPHONE ENCOUNTER
Rx Refill Note  Requested Prescriptions     Pending Prescriptions Disp Refills    valACYclovir (Valtrex) 1000 MG tablet 30 tablet 2     Sig: Use 2 tablets twice a day x1 day with outbreaks      Last office visit with prescribing clinician: 3/6/2024   Last telemedicine visit with prescribing clinician: Visit date not found   Next office visit with prescribing clinician: 9/17/2024                         Would you like a call back once the refill request has been completed: [] Yes [] No    If the office needs to give you a call back, can they leave a voicemail: [] Yes [] No    Alia Rooney MA  06/12/24, 09:49 CDT

## 2024-06-13 RX ORDER — VALACYCLOVIR HYDROCHLORIDE 1 G/1
TABLET, FILM COATED ORAL
Qty: 30 TABLET | Refills: 2 | Status: SHIPPED | OUTPATIENT
Start: 2024-06-13

## 2024-07-09 ENCOUNTER — PATIENT MESSAGE (OUTPATIENT)
Dept: FAMILY MEDICINE CLINIC | Facility: CLINIC | Age: 69
End: 2024-07-09
Payer: MEDICARE

## 2024-07-09 DIAGNOSIS — Z12.31 ENCOUNTER FOR SCREENING MAMMOGRAM FOR MALIGNANT NEOPLASM OF BREAST: Primary | ICD-10-CM

## 2024-07-09 NOTE — TELEPHONE ENCOUNTER
From: Ave Singh  To: Yolanda Grant  Sent: 7/9/2024 3:34 PM CDT  Subject: Mammogram    I would like to have a request for a mammogram sent in for me. My last mammogram was July 11, 2023 at [facility] in [place]. Thank you

## 2024-07-20 LAB
NCCN CRITERIA FLAG: ABNORMAL
TYRER CUZICK SCORE: 7.2

## 2024-07-24 ENCOUNTER — HOSPITAL ENCOUNTER (OUTPATIENT)
Dept: MAMMOGRAPHY | Facility: HOSPITAL | Age: 69
Discharge: HOME | End: 2024-07-24
Admitting: NURSE PRACTITIONER
Payer: MEDICARE

## 2024-07-24 DIAGNOSIS — Z12.31 ENCOUNTER FOR SCREENING MAMMOGRAM FOR MALIGNANT NEOPLASM OF BREAST: ICD-10-CM

## 2024-07-24 PROCEDURE — 77063 BREAST TOMOSYNTHESIS BI: CPT

## 2024-07-24 PROCEDURE — 77067 SCR MAMMO BI INCL CAD: CPT

## 2024-08-28 ENCOUNTER — TELEPHONE (OUTPATIENT)
Dept: FAMILY MEDICINE CLINIC | Facility: CLINIC | Age: 69
End: 2024-08-28
Payer: MEDICARE

## 2024-08-28 DIAGNOSIS — N18.31 STAGE 3A CHRONIC KIDNEY DISEASE: ICD-10-CM

## 2024-08-28 DIAGNOSIS — E78.2 MIXED HYPERLIPIDEMIA: Primary | ICD-10-CM

## 2024-08-28 DIAGNOSIS — R73.03 PREDIABETES: ICD-10-CM

## 2024-08-28 DIAGNOSIS — I10 ESSENTIAL HYPERTENSION: ICD-10-CM

## 2024-08-28 DIAGNOSIS — E11.65 CONTROLLED TYPE 2 DIABETES MELLITUS WITH HYPERGLYCEMIA, WITHOUT LONG-TERM CURRENT USE OF INSULIN: ICD-10-CM

## 2024-08-28 NOTE — TELEPHONE ENCOUNTER
Caller: Ave Singh    Relationship: Self    Best call back number:     [phone] (Home)       Requested Prescriptions:   Requested Prescriptions     Pending Prescriptions Disp Refills    metFORMIN (Glucophage) 500 MG tablet 180 tablet 1     Sig: Take 1 tablet by mouth 2 (Two) Times a Day With Meals.        Pharmacy where request should be sent: [facility] [address] [phone] [facility] [phone]      Last office visit with prescribing clinician: 3/6/2024   Last telemedicine visit with prescribing clinician: Visit date not found   Next office visit with prescribing clinician: 9/17/2024     Does the patient have less than a 3 day supply:  [] Yes  [x] No    Goyo Nicolas Rep   08/28/24 08:30 CDT

## 2024-08-28 NOTE — TELEPHONE ENCOUNTER
Caller: Ave Singh    Relationship: Self    Best call back number:     [phone] (Home)       What orders are you requesting (i.e. lab or imaging): LABS FOR SUBSEQUENT MEDICARE WELLNESS    In what timeframe would the patient need to come in: THE PATIENT STATES SHE WOULD LIKE TO GET THE LABS A WEEK BEFORE THE 9/17/24 APPOINTMENT.    Where will you receive your lab/imaging services: [facility]

## 2024-08-28 NOTE — TELEPHONE ENCOUNTER
Labs ordered- called pt back to notify that fasting labs have been ordered and for her to come in 9/12 or 9/13 to complete prior to apt. Pt verbalized understanding. Wanted to know if she could have a refill of metformin, reports that she will be out of her medication before apt. Refill sent.

## 2024-09-10 DIAGNOSIS — N28.9 ABNORMAL KIDNEY FUNCTION: Primary | ICD-10-CM

## 2024-09-10 DIAGNOSIS — R79.89 ABNORMAL CBC: ICD-10-CM

## 2024-09-17 ENCOUNTER — OFFICE VISIT (OUTPATIENT)
Dept: FAMILY MEDICINE CLINIC | Facility: CLINIC | Age: 69
End: 2024-09-17
Payer: MEDICARE

## 2024-09-17 VITALS
OXYGEN SATURATION: 99 % | RESPIRATION RATE: 18 BRPM | HEIGHT: 68 IN | TEMPERATURE: 98.3 F | SYSTOLIC BLOOD PRESSURE: 110 MMHG | WEIGHT: 151 LBS | HEART RATE: 65 BPM | BODY MASS INDEX: 22.88 KG/M2 | DIASTOLIC BLOOD PRESSURE: 65 MMHG

## 2024-09-17 DIAGNOSIS — I10 ESSENTIAL HYPERTENSION: Chronic | ICD-10-CM

## 2024-09-17 DIAGNOSIS — N32.81 OVERACTIVE BLADDER: ICD-10-CM

## 2024-09-17 DIAGNOSIS — B00.1 FEVER BLISTER: ICD-10-CM

## 2024-09-17 DIAGNOSIS — B00.9 HERPES SIMPLEX: ICD-10-CM

## 2024-09-17 DIAGNOSIS — Z23 IMMUNIZATION DUE: ICD-10-CM

## 2024-09-17 DIAGNOSIS — Z00.00 MEDICARE ANNUAL WELLNESS VISIT, SUBSEQUENT: Primary | ICD-10-CM

## 2024-09-17 DIAGNOSIS — E78.2 MIXED HYPERLIPIDEMIA: ICD-10-CM

## 2024-09-17 PROCEDURE — 3078F DIAST BP <80 MM HG: CPT | Performed by: NURSE PRACTITIONER

## 2024-09-17 PROCEDURE — 1170F FXNL STATUS ASSESSED: CPT | Performed by: NURSE PRACTITIONER

## 2024-09-17 PROCEDURE — 1160F RVW MEDS BY RX/DR IN RCRD: CPT | Performed by: NURSE PRACTITIONER

## 2024-09-17 PROCEDURE — G0009 ADMIN PNEUMOCOCCAL VACCINE: HCPCS | Performed by: NURSE PRACTITIONER

## 2024-09-17 PROCEDURE — 3044F HG A1C LEVEL LT 7.0%: CPT | Performed by: NURSE PRACTITIONER

## 2024-09-17 PROCEDURE — G0439 PPPS, SUBSEQ VISIT: HCPCS | Performed by: NURSE PRACTITIONER

## 2024-09-17 PROCEDURE — 3074F SYST BP LT 130 MM HG: CPT | Performed by: NURSE PRACTITIONER

## 2024-09-17 PROCEDURE — 90677 PCV20 VACCINE IM: CPT | Performed by: NURSE PRACTITIONER

## 2024-09-17 PROCEDURE — 1126F AMNT PAIN NOTED NONE PRSNT: CPT | Performed by: NURSE PRACTITIONER

## 2024-09-17 PROCEDURE — 1159F MED LIST DOCD IN RCRD: CPT | Performed by: NURSE PRACTITIONER

## 2024-09-17 RX ORDER — ACYCLOVIR 50 MG/G
CREAM TOPICAL 4 TIMES DAILY
Qty: 5 G | Refills: 3 | Status: SHIPPED | OUTPATIENT
Start: 2024-09-17

## 2024-09-17 RX ORDER — SOLIFENACIN SUCCINATE 5 MG/1
5 TABLET, FILM COATED ORAL DAILY
Qty: 90 TABLET | Refills: 1 | Status: SHIPPED | OUTPATIENT
Start: 2024-09-17

## 2024-09-17 RX ORDER — VALACYCLOVIR HYDROCHLORIDE 1 G/1
TABLET, FILM COATED ORAL
Qty: 30 TABLET | Refills: 2 | Status: SHIPPED | OUTPATIENT
Start: 2024-09-17

## 2024-09-17 RX ORDER — METOPROLOL SUCCINATE 100 MG/1
100 TABLET, EXTENDED RELEASE ORAL DAILY
Qty: 90 TABLET | Refills: 1 | Status: SHIPPED | OUTPATIENT
Start: 2024-09-17

## 2024-09-17 RX ORDER — LOVASTATIN 40 MG
40 TABLET ORAL NIGHTLY
Qty: 90 TABLET | Refills: 1 | Status: SHIPPED | OUTPATIENT
Start: 2024-09-17

## 2024-09-17 RX ORDER — LOSARTAN POTASSIUM AND HYDROCHLOROTHIAZIDE 25; 100 MG/1; MG/1
1 TABLET ORAL DAILY
Qty: 90 TABLET | Refills: 1 | Status: SHIPPED | OUTPATIENT
Start: 2024-09-17 | End: 2025-09-17

## 2024-09-17 RX ORDER — FENOFIBRATE 145 MG/1
145 TABLET, COATED ORAL DAILY
Qty: 90 TABLET | Refills: 1 | Status: SHIPPED | OUTPATIENT
Start: 2024-09-17

## 2024-09-18 DIAGNOSIS — B00.9 HERPES SIMPLEX: Primary | ICD-10-CM

## 2024-09-18 DIAGNOSIS — B00.1 FEVER BLISTER: ICD-10-CM

## 2024-09-20 RX ORDER — ACYCLOVIR 50 MG/G
1 OINTMENT TOPICAL
Qty: 15 G | Refills: 2 | Status: SHIPPED | OUTPATIENT
Start: 2024-09-20

## 2024-11-11 ENCOUNTER — OFFICE VISIT (OUTPATIENT)
Dept: FAMILY MEDICINE CLINIC | Facility: CLINIC | Age: 69
End: 2024-11-11
Payer: MEDICARE

## 2024-11-11 VITALS
BODY MASS INDEX: 23.19 KG/M2 | HEART RATE: 58 BPM | HEIGHT: 68 IN | RESPIRATION RATE: 18 BRPM | WEIGHT: 153 LBS | TEMPERATURE: 98.6 F | DIASTOLIC BLOOD PRESSURE: 72 MMHG | SYSTOLIC BLOOD PRESSURE: 133 MMHG | OXYGEN SATURATION: 99 %

## 2024-11-11 DIAGNOSIS — M79.602 LEFT ARM PAIN: Primary | ICD-10-CM

## 2024-11-11 PROCEDURE — 3075F SYST BP GE 130 - 139MM HG: CPT | Performed by: NURSE PRACTITIONER

## 2024-11-11 PROCEDURE — 1125F AMNT PAIN NOTED PAIN PRSNT: CPT | Performed by: NURSE PRACTITIONER

## 2024-11-11 PROCEDURE — 96372 THER/PROPH/DIAG INJ SC/IM: CPT | Performed by: NURSE PRACTITIONER

## 2024-11-11 PROCEDURE — 99214 OFFICE O/P EST MOD 30 MIN: CPT | Performed by: NURSE PRACTITIONER

## 2024-11-11 PROCEDURE — 3044F HG A1C LEVEL LT 7.0%: CPT | Performed by: NURSE PRACTITIONER

## 2024-11-11 PROCEDURE — 1160F RVW MEDS BY RX/DR IN RCRD: CPT | Performed by: NURSE PRACTITIONER

## 2024-11-11 PROCEDURE — 3078F DIAST BP <80 MM HG: CPT | Performed by: NURSE PRACTITIONER

## 2024-11-11 PROCEDURE — 1159F MED LIST DOCD IN RCRD: CPT | Performed by: NURSE PRACTITIONER

## 2024-11-11 RX ORDER — ASPIRIN 81 MG/1
81 TABLET ORAL DAILY
COMMUNITY

## 2024-11-11 RX ORDER — DEXAMETHASONE SODIUM PHOSPHATE 10 MG/ML
10 INJECTION INTRAMUSCULAR; INTRAVENOUS ONCE
Status: COMPLETED | OUTPATIENT
Start: 2024-11-11 | End: 2024-11-11

## 2024-11-11 RX ADMIN — DEXAMETHASONE SODIUM PHOSPHATE 10 MG: 10 INJECTION INTRAMUSCULAR; INTRAVENOUS at 09:07

## 2024-11-11 NOTE — PROGRESS NOTES
"Chief Complaint  Arm Pain    Subjective        Ave Singh presents to [facility]  History of Present Illness  Aspirin use is indicated based on review of current medical condition/s. Pros and cons of this therapy have been discussed today. Benefits of this medication outweigh potential harm.  Patient has been encouraged to continue taking this medication.      The patient is a 68-year-old female who presents today for complaints of left arm pain.    She reports experiencing pain in her left arm, which intensifies when she attempts to move it behind her or sleep on it. The discomfort began approximately 5 weeks ago, with no recollection of any specific injury that might have triggered it. She describes a general ache throughout her entire arm but notes that the pain does not extend into her shoulder. She reports no numbness or tingling in her fingers.    She has been managing the pain with naproxen, which she took consistently for 4 days and continues to use intermittently.    The following portions of the patient's history were reviewed and updated as appropriate: allergies, current medications, past family history, past medical history, past social history, past surgical history and problem list.    Objective   Vital Signs:  /72 (BP Location: Right arm, Patient Position: Sitting, Cuff Size: Large Adult)   Pulse 58   Temp 98.6 °F (37 °C) (Infrared)   Resp 18   Ht 172.7 cm (68.01\") Comment: per patient  Wt 69.4 kg (153 lb)   SpO2 99%   BMI 23.26 kg/m²   Estimated body mass index is 23.26 kg/m² as calculated from the following:    Height as of this encounter: 172.7 cm (68.01\").    Weight as of this encounter: 69.4 kg (153 lb).     Patient Active Problem List   Diagnosis    Hypertension    Hyperlipidemia    Herpes simplex    Plantar fasciitis, bilateral    Osteoporosis    Plantar fasciitis    Vitamin D deficiency    Benign neoplasm of ascending colon    Snoring    " Other fatigue    Abnormal EKG    Shortness of breath    Palpitations    Positive colorectal cancer screening using Cologuard test    Hx of colonic polyps    Abnormal results of liver function studies    Osteopenia    Benign neoplasm of colon    Herpes simplex    Mixed hyperlipidemia    Mallet toe    Calcaneal spur    Contracture of Achilles tendon    Foot pain    Hammer toe    Abnormal liver function tests    Benign neoplasm of ascending colon    Mixed hyperlipidemia    Plantar fasciitis    COVID-19    Finding of above normal blood pressure    Inflamed seborrheic keratosis    Other skin changes due to chronic exposure to nonionizing radiation    Melanocytic nevi of trunk    Neoplasm of uncertain behavior of skin    Psoriasis    Xerosis cutis    Hypertension    Other specified epidermal thickening    Other melanin hyperpigmentation    Chronic kidney disease due to hypertension    Stage 3a chronic kidney disease     BMI is within normal parameters. No other follow-up for BMI required.    Physical Exam  Vitals and nursing note reviewed.   Constitutional:       General: She is awake.      Appearance: Normal appearance. She is well-developed and well-groomed.   HENT:      Head: Normocephalic and atraumatic.      Right Ear: Hearing, tympanic membrane, ear canal and external ear normal.      Left Ear: Hearing, tympanic membrane, ear canal and external ear normal.      Nose: Nose normal.      Mouth/Throat:      Lips: Pink.      Pharynx: Oropharynx is clear.   Eyes:      General: Lids are normal.      Conjunctiva/sclera: Conjunctivae normal.   Cardiovascular:      Rate and Rhythm: Normal rate and regular rhythm.      Heart sounds: Normal heart sounds.   Pulmonary:      Effort: Pulmonary effort is normal.      Breath sounds: Normal breath sounds and air entry.   Musculoskeletal:      Right upper arm: Normal.      Left upper arm: Tenderness present.        Arms:       Cervical back: Full passive range of motion without pain.       Right lower leg: No edema.      Left lower leg: No edema.      Comments: Has tenderness in the upper arm, has decreased rom with crossing over and raising up past heart, and can not go above shoulder height.  weak    Lymphadenopathy:      Head:      Right side of head: No submental, submandibular or tonsillar adenopathy.      Left side of head: No submental, submandibular or tonsillar adenopathy.   Skin:     General: Skin is warm and dry.   Neurological:      Mental Status: She is alert and oriented to person, place, and time.      Sensory: Sensation is intact.      Motor: Motor function is intact.      Coordination: Coordination is intact.      Gait: Gait is intact.   Psychiatric:         Attention and Perception: Attention and perception normal.         Mood and Affect: Mood and affect normal.         Speech: Speech normal.         Behavior: Behavior normal. Behavior is cooperative.         Thought Content: Thought content normal.         Cognition and Memory: Cognition and memory normal.         Judgment: Judgment normal.        Physical Exam      Result Review :          Results  Narrative & Impression   EXAM: XR SHOULDER 2+ VW LEFT-      DATE: 11/11/2024 8:05 AM     HISTORY: pain in arm left; M79.602-Pain in left arm       COMPARISON: None available.     TECHNIQUE:   Internal and external rotation views and scapular y view  obtained.     FINDINGS:    No acute fracture or dislocation is seen. Glenohumeral and AC joints are  intact. Soft tissues are unremarkable.        IMPRESSION:  1. Unremarkable exam.     This report was signed and finalized on 11/11/2024 9:17 AM by Teodoro Marroquin.                 Assessment and Plan     Diagnoses and all orders for this visit:    1. Left arm pain (Primary)  -     XR Shoulder 2+ View Left (In Office)  -     diclofenac (VOLTAREN) 50 MG EC tablet; Take 1 tablet by mouth 2 (Two) Times a Day.  Dispense: 60 tablet; Refill: 1  -     dexAMETHasone (DECADRON) injection 10  mg      Assessment & Plan  1. Left upper arm pain.  A torn rotator cuff is a potential cause of her discomfort. An x-ray will be conducted to further investigate the issue. A steroid injection will be administered to alleviate the pain. She can continue taking Naprosyn or switch to diclofenac for stronger pain relief. If there is no improvement in a couple of weeks, an MRI will be considered.    2. Hyperlipidemia.  She will maintain her current regimen of fenofibrate, lovastatin, and omega-3 fatty acids.    3. Hypertension.  Her current treatment plan includes losartan-HCTZ, aspirin, and metoprolol.    4. Fever blisters.  She will continue using Valtrex and Zovirax ointment.    5. Vitamin D deficiency.  She will continue taking over-the-counter D3 supplements.    6. GERD.  She will continue taking Tagamet as needed.    7. Prediabetes.  Her last A1c was 6. She will continue taking metformin.    Follow-up  Return in 2 weeks for follow up, or sooner if necessary.      ICD-10-CM ICD-9-CM   1. Left arm pain  M79.602 729.5                Follow Up     Return in about 2 weeks (around 11/25/2024).  Patient was given instructions and counseling regarding her condition or for health maintenance advice. Please see specific information pulled into the AVS if appropriate.         Patient or patient representative verbalized consent for the use of Ambient Listening during the visit with  Yolanda Grant DNP, MARY for chart documentation. 11/11/2024  08:53 CST    Electronically signed by Yolanda Grant DNP, MARY, 11/11/24, 8:53 AM CST.

## 2024-11-12 DIAGNOSIS — N28.9 ABNORMAL KIDNEY FUNCTION: Primary | ICD-10-CM

## 2024-11-20 DIAGNOSIS — E11.65 CONTROLLED TYPE 2 DIABETES MELLITUS WITH HYPERGLYCEMIA, WITHOUT LONG-TERM CURRENT USE OF INSULIN: ICD-10-CM

## 2024-11-21 NOTE — TELEPHONE ENCOUNTER
Rx Refill Note  Requested Prescriptions     Pending Prescriptions Disp Refills    metFORMIN (Glucophage) 500 MG tablet 180 tablet 0     Sig: Take 1 tablet by mouth 2 (Two) Times a Day With Meals.      Last office visit with prescribing clinician: 11/11/2024      Next office visit with prescribing clinician: 3/17/2025     Susie Blancas MA  11/21/24, 08:14 CST

## 2024-11-26 ENCOUNTER — OFFICE VISIT (OUTPATIENT)
Dept: FAMILY MEDICINE CLINIC | Facility: CLINIC | Age: 69
End: 2024-11-26
Payer: MEDICARE

## 2024-11-26 VITALS
WEIGHT: 154 LBS | TEMPERATURE: 97.1 F | BODY MASS INDEX: 23.34 KG/M2 | DIASTOLIC BLOOD PRESSURE: 68 MMHG | HEART RATE: 67 BPM | OXYGEN SATURATION: 98 % | SYSTOLIC BLOOD PRESSURE: 138 MMHG | HEIGHT: 68 IN

## 2024-11-26 DIAGNOSIS — M79.602 LEFT ARM PAIN: Primary | ICD-10-CM

## 2024-11-26 PROCEDURE — 1160F RVW MEDS BY RX/DR IN RCRD: CPT | Performed by: NURSE PRACTITIONER

## 2024-11-26 PROCEDURE — 3078F DIAST BP <80 MM HG: CPT | Performed by: NURSE PRACTITIONER

## 2024-11-26 PROCEDURE — 1125F AMNT PAIN NOTED PAIN PRSNT: CPT | Performed by: NURSE PRACTITIONER

## 2024-11-26 PROCEDURE — 99213 OFFICE O/P EST LOW 20 MIN: CPT | Performed by: NURSE PRACTITIONER

## 2024-11-26 PROCEDURE — 1159F MED LIST DOCD IN RCRD: CPT | Performed by: NURSE PRACTITIONER

## 2024-11-26 PROCEDURE — 3075F SYST BP GE 130 - 139MM HG: CPT | Performed by: NURSE PRACTITIONER

## 2024-11-26 PROCEDURE — 3044F HG A1C LEVEL LT 7.0%: CPT | Performed by: NURSE PRACTITIONER

## 2024-11-26 NOTE — PROGRESS NOTES
"Chief Complaint  Follow-up (Follow up regarding left arm pain.  Pain is worse when she lays down. State her arm \"feels heavy\")    Subjective        Ave Singh presents to [facility]  History of Present Illness  History of Present Illness  The patient is a 69-year-old female who presents for follow-up of left arm pain.    She was seen 2 weeks ago with complaints of left arm pain that intensifies with movement. She has been experiencing this discomfort for approximately 8 weeks. She describes the sensation as a dull ache, accompanied by a feeling of heaviness in her arm. The pain is localized to her arm, with no radiation into her fingers. She reports no numbness or tingling in her fingers. Additionally, she does not experience any difficulty in gripping or lifting objects. She says that the heaviness and pain area is in the lower part of the arm by wrist but up into the biceps area.      Electronically signed by Yolanda Grant, BIBIANA, APRN, 11/26/24, 10:18 AM CST.      Objective   Vital Signs:  /68 (BP Location: Right arm, Patient Position: Sitting, Cuff Size: Adult)   Pulse 67   Temp 97.1 °F (36.2 °C) (Temporal)   Ht 172.7 cm (68.01\")   Wt 69.9 kg (154 lb)   SpO2 98%   BMI 23.41 kg/m²   Estimated body mass index is 23.41 kg/m² as calculated from the following:    Height as of this encounter: 172.7 cm (68.01\").    Weight as of this encounter: 69.9 kg (154 lb).       BMI is within normal parameters. No other follow-up for BMI required.        Physical Exam  Vitals and nursing note reviewed.   Constitutional:       General: She is awake.      Appearance: Normal appearance. She is well-developed and well-groomed.   HENT:      Head: Normocephalic and atraumatic.      Right Ear: Hearing and external ear normal.      Left Ear: Hearing and external ear normal.      Nose: Nose normal.      Mouth/Throat:      Lips: Pink.      Pharynx: Oropharynx is clear.   Eyes:      " General: Lids are normal.      Conjunctiva/sclera: Conjunctivae normal.   Cardiovascular:      Rate and Rhythm: Normal rate and regular rhythm.      Heart sounds: Normal heart sounds.   Pulmonary:      Effort: Pulmonary effort is normal.      Breath sounds: Normal breath sounds and air entry.   Musculoskeletal:      Right forearm: Normal.      Left forearm: Tenderness present.        Arms:       Cervical back: Full passive range of motion without pain.      Right lower leg: No edema.      Left lower leg: No edema.      Comments: Has difficulty with pronation,  weak has good rom    Lymphadenopathy:      Head:      Right side of head: No submental, submandibular or tonsillar adenopathy.      Left side of head: No submental, submandibular or tonsillar adenopathy.   Skin:     General: Skin is warm and dry.   Neurological:      Mental Status: She is alert and oriented to person, place, and time.      Sensory: Sensation is intact.      Motor: Motor function is intact.      Coordination: Coordination is intact.      Gait: Gait is intact.   Psychiatric:         Attention and Perception: Attention and perception normal.         Mood and Affect: Mood and affect normal.         Speech: Speech normal.         Behavior: Behavior normal. Behavior is cooperative.         Thought Content: Thought content normal.         Cognition and Memory: Cognition and memory normal.         Judgment: Judgment normal.        Physical Exam      Result Review :          Results  Imaging  X-ray of the arm did not show any abnormalities.           Assessment and Plan     Diagnoses and all orders for this visit:    1. Left arm pain (Primary)  -     Ambulatory Referral to Orthopedic Surgery  -     tiZANidine (ZANAFLEX) 4 MG tablet; Take 1 tablet by mouth At Night As Needed for Muscle Spasms.  Dispense: 30 tablet; Refill: 0      Assessment & Plan  1. Pain in the left arm.  The x-ray results did not reveal any significant findings. Despite the  administration of anti-inflammatory medications, there has been no noticeable improvement in her condition. A referral to an orthopedic specialist will be initiated for further evaluation and management. An MRI may be considered if deemed necessary by the specialist.      ICD-10-CM ICD-9-CM   1. Left arm pain  M79.602 729.5                Follow Up     Return in about 1 month (around 12/26/2024) for Recheck.  Patient was given instructions and counseling regarding her condition or for health maintenance advice. Please see specific information pulled into the AVS if appropriate.       Patient or patient representative verbalized consent for the use of Ambient Listening during the visit with  Yolanda Grant DNP, MARY for chart documentation. 11/26/2024  10:18 CST    Electronically signed by Yolanda Grant DNP, APRN, 11/26/24, 10:18 AM CST.

## 2024-12-02 ENCOUNTER — OFFICE VISIT (OUTPATIENT)
Dept: OBSTETRICS AND GYNECOLOGY | Age: 69
End: 2024-12-02
Payer: MEDICARE

## 2024-12-02 VITALS
HEIGHT: 68 IN | SYSTOLIC BLOOD PRESSURE: 154 MMHG | BODY MASS INDEX: 22.88 KG/M2 | DIASTOLIC BLOOD PRESSURE: 70 MMHG | WEIGHT: 151 LBS

## 2024-12-02 DIAGNOSIS — N32.81 OVERACTIVE BLADDER: ICD-10-CM

## 2024-12-02 DIAGNOSIS — N39.41 URGE INCONTINENCE: Primary | ICD-10-CM

## 2024-12-02 DIAGNOSIS — N94.10 DYSPAREUNIA IN FEMALE: ICD-10-CM

## 2024-12-02 DIAGNOSIS — N39.0 RECURRENT UTI: ICD-10-CM

## 2024-12-02 DIAGNOSIS — N81.6 RECTOCELE: ICD-10-CM

## 2024-12-02 DIAGNOSIS — N81.10 FEMALE CYSTOCELE: ICD-10-CM

## 2024-12-02 RX ORDER — SOLIFENACIN SUCCINATE 5 MG/1
5 TABLET, FILM COATED ORAL DAILY
Qty: 90 TABLET | Refills: 3 | Status: SHIPPED | OUTPATIENT
Start: 2024-12-02

## 2024-12-02 NOTE — PROGRESS NOTES
"Chief Complaint   Patient presents with    Gynecologic Exam     Patient is here for yearly check. Patient has had a hysterectomy, Last Mammo 7/24/24. Dexa 7/19/23. Colonoscopy 2019. Patient denies any problems or concerns.         Subjective     Ave Singh is a 69 y.o. female    History of Present Illness  PT here today for yearly follow up. Having no complaints. Requests refills on urinary meds. Continues to do well on them. Didn't like the estrace cream.       /70 (BP Location: Left arm, Patient Position: Sitting, Cuff Size: Adult)   Ht 172.7 cm (68\")   Wt 68.5 kg (151 lb)   BMI 22.96 kg/m²     Outpatient Encounter Medications as of 12/2/2024   Medication Sig Dispense Refill    acyclovir (ZOVIRAX) 5 % cream Apply  topically to the appropriate area as directed 4 (Four) Times a Day. 5 g 3    acyclovir (Zovirax) 5 % ointment Apply 1 Application topically to the appropriate area as directed Every 3 (Three) Hours. 15 g 2    albuterol (PROVENTIL) (2.5 MG/3ML) 0.083% nebulizer solution Take 2.5 mg by nebulization Every 4 (Four) Hours As Needed for Wheezing. 60 mL 2    aspirin 81 MG EC tablet Take 1 tablet by mouth Daily.      cephalexin (KEFLEX) 250 MG capsule Take 1 capsule by mouth Daily. 90 capsule 3    cimetidine (TAGAMET) 200 MG tablet Take 1 tablet by mouth Daily As Needed.      diclofenac (VOLTAREN) 50 MG EC tablet Take 1 tablet by mouth 2 (Two) Times a Day. 60 tablet 1    fenofibrate (TRICOR) 145 MG tablet Take 1 tablet by mouth Daily. 90 tablet 1    fluticasone (FLONASE) 50 MCG/ACT nasal spray 2 sprays into the nostril(s) as directed by provider Daily As Needed for Rhinitis or Allergies. 9.9 mL 5    losartan-hydrochlorothiazide (Hyzaar) 100-25 MG per tablet Take 1 tablet by mouth Daily. Delete existing refills. 90 tablet 1    lovastatin (MEVACOR) 40 MG tablet Take 1 tablet by mouth Every Night. Delete existing refills. 90 tablet 1    metFORMIN (Glucophage) 500 MG tablet Take 1 tablet by mouth 2 " (Two) Times a Day With Meals. 180 tablet 0    metoprolol succinate XL (Toprol XL) 100 MG 24 hr tablet Take 1 tablet by mouth Daily. Delete existing refills. 90 tablet 1    Multiple Vitamins-Minerals (MULTIVITAMIN ADULT PO) Take 1 tablet by mouth Daily.      Omega-3 Fatty Acids (fish oil) 1200 MG capsule capsule Take 1 tablet by mouth Daily.      solifenacin (VESIcare) 5 MG tablet Take 1 tablet by mouth Daily. 90 tablet 3    tiZANidine (ZANAFLEX) 4 MG tablet Take 1 tablet by mouth At Night As Needed for Muscle Spasms. 30 tablet 0    valACYclovir (Valtrex) 1000 MG tablet Use 2 tablets twice a day x1 day with outbreaks 30 tablet 2    vitamin C (ASCORBIC ACID) 500 MG tablet Take 2 tablets by mouth Daily.      vitamin D3 125 MCG (5000 UT) capsule capsule Take 1 capsule by mouth Daily.      [DISCONTINUED] cephalexin (KEFLEX) 250 MG capsule Take 1 capsule by mouth 4 (Four) Times a Day.      [DISCONTINUED] solifenacin (VESIcare) 5 MG tablet Take 1 tablet by mouth Daily. 90 tablet 1     No facility-administered encounter medications on file as of 12/2/2024.       Past Medical History  Past Medical History:   Diagnosis Date    Abnormal EKG 3/13/2019    Chronic kidney disease due to hypertension 5/5/2024    GERD (gastroesophageal reflux disease)     Herpes simplex     Hyperlipidemia     Hypertension     Left bundle branch block 3/13/2019        Surgical History  Past Surgical History:   Procedure Laterality Date    APPENDECTOMY  08/2008    COLONOSCOPY N/A 05/23/2019    Procedure: COLONOSCOPY WITH ANESTHESIA;  Surgeon: Anni Smith MD;  Location: [facility];  Service: Gastroenterology    HERNIA REPAIR      Done at same time as appendectomy    HYSTERECTOMY      pt reports laparoscopic hysterectomy, BSO and a bladder suspension, hyst done for prolapse    LASIK  2005    SKIN CANCER EXCISION      bcc of forehead  06/20/22       Family History  Family History   Problem Relation Age of Onset    Cancer Mother     Breast  cancer Mother     Heart disease Father     Heart attack Father 62    Hypertension Brother     Cancer Maternal Aunt     Cancer Maternal Grandmother     Colon polyps Neg Hx     Colon cancer Neg Hx        The following portions of the patient's history were reviewed and updated as appropriate: allergies, current medications, past family history, past medical history, past social history, past surgical history, and problem list.    Review of Systems   Constitutional:  Negative for activity change and unexpected weight loss.   HENT:  Negative for congestion.    Cardiovascular:  Negative for chest pain.   Gastrointestinal:  Negative for blood in stool, constipation and diarrhea.   Endocrine: Negative for cold intolerance and heat intolerance.   Genitourinary:  Positive for dyspareunia. Negative for pelvic pain and vaginal discharge.   Musculoskeletal:  Negative for arthralgias, back pain, neck pain and neck stiffness.   Skin:  Negative for rash.   Neurological:  Negative for dizziness and headache.   Psychiatric/Behavioral:  Negative for sleep disturbance. The patient is not nervous/anxious.        Objective   Physical Exam  Vitals and nursing note reviewed. Exam conducted with a chaperone present.   Constitutional:       General: She is not in acute distress.     Appearance: She is well-developed.   HENT:      Head: Normocephalic and atraumatic.   Neck:      Thyroid: No thyromegaly.   Cardiovascular:      Rate and Rhythm: Normal rate and regular rhythm.      Heart sounds: No murmur heard.  Pulmonary:      Effort: Pulmonary effort is normal.      Breath sounds: Normal breath sounds.   Chest:   Breasts:     Right: No inverted nipple or mass.      Left: No inverted nipple or mass.   Abdominal:      General: There is no distension.      Palpations: Abdomen is soft.      Tenderness: There is no abdominal tenderness.   Genitourinary:     Exam position: Supine.      Labia:         Right: No tenderness or lesion.         Left:  No tenderness or lesion.       Vagina: Normal. No vaginal discharge or bleeding.      Uterus: Absent.       Adnexa:         Right: No tenderness or fullness.          Left: No tenderness or fullness.        Rectum: Normal anal tone.      Comments: Patient s/ophysterectomy, Vaginal cuff unremarkable. Labia normal, no lesions noted. Urethral meatus unremarkable, no prolapse of mucosa. Anus/perineum normal    Moderate Cystocele and rectocele noted.    Musculoskeletal:         General: Normal range of motion.      Cervical back: Normal range of motion and neck supple.   Skin:     General: Skin is warm and dry.   Neurological:      Mental Status: She is alert and oriented to person, place, and time.   Psychiatric:         Behavior: Behavior normal.         Judgment: Judgment normal.              Assessment & Plan   Diagnoses and all orders for this visit:    1. Urge incontinence (Primary)    2. Overactive bladder  -     cephalexin (KEFLEX) 250 MG capsule; Take 1 capsule by mouth Daily.  Dispense: 90 capsule; Refill: 3  -     solifenacin (VESIcare) 5 MG tablet; Take 1 tablet by mouth Daily.  Dispense: 90 tablet; Refill: 3    3. Recurrent UTI    4. Dyspareunia in female    5. Female cystocele    6. Rectocele         Normal GYN exam. Encouraged SBE, pt is aware how to do self breast exam and the importance of same. Discussed weight management and importance of maintaining a healthy weight. Discussed Vitamin D intake and the importance of adequate vitamin D for both bone health and a healthy immune system.  Discussed daily exercise and the importance of same, in regards to a healthy heart as well as helping to maintain her weight and improving her mental health.  Colonoscopy is up to date. Mammogram is up to date. Bone density is up to date. Pap smear is not done per ASCCP guidelines. HPV is not done. Lab work up is up to date through PCP.     Pt doing well with Keflex and vesicare and wishes to continue same.   Pt didn't  like the estrace vaginal cream. Is still sexually active but not as much. Declines any treatment replacement.     Stable cystocele on exam today.     BMI is within normal parameters. No other follow-up for BMI required.      Carol Conner, APRN  12/2/2024    Return in about 1 year (around 12/2/2025) for Annual physical.

## 2025-01-07 ENCOUNTER — TELEPHONE (OUTPATIENT)
Dept: FAMILY MEDICINE CLINIC | Facility: CLINIC | Age: 70
End: 2025-01-07
Payer: MEDICARE

## 2025-01-07 NOTE — TELEPHONE ENCOUNTER
Pt seen ortho and has mild degeneration of anterior aspect of glenoid labrum, mild hypertrophic arthroplasty, partial thickness tear of anterior fibers of distal supraspinatus tendon, no full rotator cuff tear.   Electronically signed by Yolanda Grant DNP, APRN, 01/07/25, 1:11 PM CST.

## 2025-02-25 DIAGNOSIS — E11.65 CONTROLLED TYPE 2 DIABETES MELLITUS WITH HYPERGLYCEMIA, WITHOUT LONG-TERM CURRENT USE OF INSULIN: ICD-10-CM

## 2025-02-25 NOTE — TELEPHONE ENCOUNTER
Caller: Ave Singh    Relationship: Self    Best call back number: [phone]     Requested Prescriptions:   Requested Prescriptions     Pending Prescriptions Disp Refills    metFORMIN (Glucophage) 500 MG tablet 180 tablet 0     Sig: Take 1 tablet by mouth 2 (Two) Times a Day With Meals.        Pharmacy where request should be sent: [facility] [address] [phone] [facility] [phone]      Last office visit with prescribing clinician: 11/26/2024   Last telemedicine visit with prescribing clinician: Visit date not found   Next office visit with prescribing clinician: 3/17/2025     Additional details provided by patient:     Does the patient have less than a 3 day supply:  [] Yes  [x] No    Would you like a call back once the refill request has been completed: [x] Yes [] No    If the office needs to give you a call back, can they leave a voicemail: [] Yes [] No    Goyo Lay Rep   02/25/25 10:15 CST

## 2025-03-07 ENCOUNTER — PATIENT MESSAGE (OUTPATIENT)
Dept: FAMILY MEDICINE CLINIC | Facility: CLINIC | Age: 70
End: 2025-03-07
Payer: MEDICARE

## 2025-03-07 DIAGNOSIS — N18.31 STAGE 3A CHRONIC KIDNEY DISEASE: ICD-10-CM

## 2025-03-07 DIAGNOSIS — E78.2 MIXED HYPERLIPIDEMIA: ICD-10-CM

## 2025-03-07 DIAGNOSIS — E11.65 CONTROLLED TYPE 2 DIABETES MELLITUS WITH HYPERGLYCEMIA, WITHOUT LONG-TERM CURRENT USE OF INSULIN: Primary | ICD-10-CM

## 2025-03-17 ENCOUNTER — OFFICE VISIT (OUTPATIENT)
Dept: FAMILY MEDICINE CLINIC | Facility: CLINIC | Age: 70
End: 2025-03-17
Payer: MEDICARE

## 2025-03-17 VITALS
SYSTOLIC BLOOD PRESSURE: 137 MMHG | BODY MASS INDEX: 23.73 KG/M2 | TEMPERATURE: 98 F | DIASTOLIC BLOOD PRESSURE: 73 MMHG | HEART RATE: 73 BPM | HEIGHT: 68 IN | WEIGHT: 156.6 LBS | OXYGEN SATURATION: 98 %

## 2025-03-17 DIAGNOSIS — E11.65 CONTROLLED TYPE 2 DIABETES MELLITUS WITH HYPERGLYCEMIA, WITHOUT LONG-TERM CURRENT USE OF INSULIN: ICD-10-CM

## 2025-03-17 DIAGNOSIS — N18.31 STAGE 3A CHRONIC KIDNEY DISEASE: ICD-10-CM

## 2025-03-17 DIAGNOSIS — I10 ESSENTIAL HYPERTENSION: Chronic | ICD-10-CM

## 2025-03-17 DIAGNOSIS — E78.2 MIXED HYPERLIPIDEMIA: ICD-10-CM

## 2025-03-17 PROCEDURE — 1126F AMNT PAIN NOTED NONE PRSNT: CPT | Performed by: NURSE PRACTITIONER

## 2025-03-17 PROCEDURE — 99214 OFFICE O/P EST MOD 30 MIN: CPT | Performed by: NURSE PRACTITIONER

## 2025-03-17 PROCEDURE — 3078F DIAST BP <80 MM HG: CPT | Performed by: NURSE PRACTITIONER

## 2025-03-17 PROCEDURE — 3044F HG A1C LEVEL LT 7.0%: CPT | Performed by: NURSE PRACTITIONER

## 2025-03-17 PROCEDURE — 1159F MED LIST DOCD IN RCRD: CPT | Performed by: NURSE PRACTITIONER

## 2025-03-17 PROCEDURE — 1160F RVW MEDS BY RX/DR IN RCRD: CPT | Performed by: NURSE PRACTITIONER

## 2025-03-17 PROCEDURE — 3075F SYST BP GE 130 - 139MM HG: CPT | Performed by: NURSE PRACTITIONER

## 2025-03-17 RX ORDER — METOPROLOL SUCCINATE 100 MG/1
100 TABLET, EXTENDED RELEASE ORAL DAILY
Qty: 90 TABLET | Refills: 1 | Status: SHIPPED | OUTPATIENT
Start: 2025-03-17

## 2025-03-17 RX ORDER — LOVASTATIN 40 MG/1
40 TABLET ORAL NIGHTLY
Qty: 90 TABLET | Refills: 1 | Status: SHIPPED | OUTPATIENT
Start: 2025-03-17

## 2025-03-17 RX ORDER — FENOFIBRATE 145 MG/1
145 TABLET, COATED ORAL DAILY
Qty: 90 TABLET | Refills: 1 | Status: SHIPPED | OUTPATIENT
Start: 2025-03-17

## 2025-03-17 RX ORDER — LOSARTAN POTASSIUM AND HYDROCHLOROTHIAZIDE 25; 100 MG/1; MG/1
1 TABLET ORAL DAILY
Qty: 90 TABLET | Refills: 1 | Status: SHIPPED | OUTPATIENT
Start: 2025-03-17 | End: 2026-03-17

## 2025-03-17 NOTE — PROGRESS NOTES
Chief Complaint  Primary Care Follow-Up (Six month follow up HTN, hyperlipidemia, med refills)    Janny Singh presents to [facility]  History of Present Illness  History of Present Illness  The patient is a 69-year-old female who presents today for follow-up on chronic conditions including hypertension, hyperlipidemia, diabetes, seasonal allergies, fever blisters, shoulder pain, bone spur on foot, and chronic kidney disease secondary to hypertension.    She has been managing hypertension for over a year, with no reported chest pain, shortness of breath, or palpitations associated with the condition. She reports no use of amphetamines, anorectics, estrogens, or thyroid hormones. She maintains a regular exercise routine through her daily activities. Past treatments for hypertension have included lisinopril. Current treatment includes losartan and HCTZ.    She has been managing hyperlipidemia for over a year, with no exacerbating conditions such as obesity, liver disease, or hypothyroidism. She reports no focal sensory loss or myalgias. She reports no warm or swollen joints, and no associated dry mouth, rash, or dysuria. She reports no issues with headaches. Current therapy includes moderate improvement with no compliance issues.    She has been managing diabetes for over a year, with no reported issues with urinary frequency, urgency, burning, or flank pain. She is currently on metformin without any complications.    She has a history of fever blisters, which occasionally flare up. She uses acyclovir as needed for fever blisters.    She also suffers from seasonal allergies. These are managed with Flonase.    She had shoulder pain in November or December and saw an orthopedic doctor in [facility], [place]. They did an MRI and found a couple of small tears. She has an appointment next week for her chronic kidney disease secondary to hypertension. It is chronic intermittent.  "She has not been having any problems or symptoms such as urinary frequency, urgency, burning, or flank pain. There are no treatments or aggravating factors. She received a shot in her shoulder, which has alleviated the pain.    She had a bone spur on her foot, which has not flared up for quite a while.    She has an appointment next week for her chronic kidney disease secondary to hypertension. It is chronic intermittent. She has not been having any problems or symptoms such as urinary frequency, urgency, burning, or flank pain. There are no treatments or aggravating factors.    FAMILY HISTORY  She has a family history of dyslipidemia.    MEDICATIONS  Current: Losartan, HCTZ, metformin, Valtrex, Flonase.    IMMUNIZATIONS  She has received the first two doses of the COVID-19 vaccine. She has received the influenza vaccine and the shingles vaccine. She has also received the pneumococcal vaccine.    The following portions of the patient's history were reviewed and updated as appropriate: allergies, current medications, past family history, past medical history, past social history, past surgical history and problem list.    Aspirin use is indicated based on review of current medical condition/s. Pros and cons of this therapy have been discussed today. Benefits of this medication outweigh potential harm.  Patient has been encouraged to continue taking this medication.      Objective   Vital Signs:  /73 (BP Location: Left arm, Patient Position: Sitting, Cuff Size: Adult)   Pulse 73   Temp 98 °F (36.7 °C) (Temporal)   Ht 172.7 cm (68\")   Wt 71 kg (156 lb 9.6 oz)   SpO2 98%   BMI 23.81 kg/m²   Estimated body mass index is 23.81 kg/m² as calculated from the following:    Height as of this encounter: 172.7 cm (68\").    Weight as of this encounter: 71 kg (156 lb 9.6 oz).       BMI is within normal parameters. No other follow-up for BMI required.    Physical Exam  Vitals and nursing note reviewed.   Constitutional: "       General: She is awake.      Appearance: Normal appearance. She is well-developed and well-groomed.   HENT:      Head: Normocephalic and atraumatic.      Right Ear: Hearing, tympanic membrane, ear canal and external ear normal.      Left Ear: Hearing, tympanic membrane, ear canal and external ear normal.      Nose: Nose normal.      Mouth/Throat:      Lips: Pink.      Pharynx: Oropharynx is clear.   Eyes:      General: Lids are normal.      Conjunctiva/sclera: Conjunctivae normal.   Cardiovascular:      Rate and Rhythm: Normal rate and regular rhythm.      Heart sounds: Normal heart sounds.   Pulmonary:      Effort: Pulmonary effort is normal.      Breath sounds: Normal breath sounds and air entry.   Musculoskeletal:      Cervical back: Full passive range of motion without pain.      Right lower leg: No edema.      Left lower leg: No edema.   Lymphadenopathy:      Head:      Right side of head: No submental, submandibular or tonsillar adenopathy.      Left side of head: No submental, submandibular or tonsillar adenopathy.   Skin:     General: Skin is warm and dry.   Neurological:      Mental Status: She is alert and oriented to person, place, and time.      Sensory: Sensation is intact.      Motor: Motor function is intact.      Coordination: Coordination is intact.      Gait: Gait is intact.   Psychiatric:         Attention and Perception: Attention and perception normal.         Mood and Affect: Mood and affect normal.         Speech: Speech normal.         Behavior: Behavior normal. Behavior is cooperative.         Thought Content: Thought content normal.         Cognition and Memory: Cognition and memory normal.         Judgment: Judgment normal.        Physical Exam  Vital Signs  Blood pressure is 137/73.    Result Review :          Results  Laboratory Studies  Cholesterol was 125. Triglycerides were 90. HDL was 48. LDL was 60. A1c was 5.40.    Imaging  MRI of shoulder showed a couple of small tears.         Lab Results   Component Value Date    WBC 4.67 03/12/2025    HGB 11.9 (L) 03/12/2025    HCT 37.1 03/12/2025    MCV 88.5 03/12/2025     03/12/2025     Lab Results   Component Value Date    WBC 4.67 03/12/2025    HGB 11.9 (L) 03/12/2025    HCT 37.1 03/12/2025    MCV 88.5 03/12/2025     03/12/2025     Lab Results   Component Value Date    CHLPL 125 03/12/2025    TRIG 90 03/12/2025    HDL 48 03/12/2025    LDL 60 03/12/2025     Lab Results   Component Value Date    HGBA1C 5.40 03/12/2025     Lab Results   Component Value Date    MICROALBUR 4.5 03/12/2025          Assessment and Plan         Assessment & Plan  1. Hypertension.  Her hypertension is chronic, having persisted for over a year. Today's blood pressure reading is 137/73, indicating improved control. She reports no chest pain, shortness of breath, or palpitations associated with hypertension. She is not using amphetamines, anorectics, estrogens, or thyroid hormones. Her CAD risk factors include dyslipidemia, family history, postmenopausal state, and hypertension. Her current treatment regimen includes losartan and HCTZ.    2. Hyperlipidemia.  Her hyperlipidemia is chronic, having persisted for over a year. The most recent lipid panel shows cholesterol at 125, triglycerides at 90, HDL at 48, and LDL at 60. These levels represent the lowest triglycerides she has recorded in a significant period. There are no exacerbating conditions such as obesity, liver disease, or hypothyroidism. The condition is not aggravated by any factors. She reports no focal sensory loss or myalgias. Her current therapy has resulted in moderate improvement with no compliance issues. Her CAD risk factors include dyslipidemia, family history, hypertension, postmenopausal state, osteoarthritis, and osteopenia. Her symptoms are well-controlled, with no pain reported today. There are no associated dry mouth, rash, or dysuria. Overall compliance is 100 percent, with no issues  related to medication or psychosocial factors.  Currently on fenofibrate, omega 3 fatty acids, and lovastatin.     3. Diabetes.  Her diabetes is chronic, having persisted for over a year. She is currently taking metformin without any issues. Her A1c level is well-controlled at 5.40. She has an appointment scheduled for next week. She has chronic kidney disease secondary to hypertension, which is chronic intermittent. She reports no problems or symptoms such as urinary frequency, urgency, burning, or flank pain. There are no treatments or aggravating factors.    4. Fever blisters.  She experiences occasional flare-ups of fever blisters. She takes Valtrex as needed.    5. Seasonal allergies.  She has seasonal allergies. These are managed with Flonase.    6. Shoulder pain.  She received a shot in her shoulder from an orthopedic doctor, which has alleviated the pain. An MRI revealed a couple of small tears. She reports no current pain.    7. Bone spur on foot.  She reports no recent flare-ups of the bone spur on her foot.    8. Chronic kidney disease secondary to hypertension.  She has an appointment scheduled for next week. She has chronic kidney disease secondary to hypertension, which is chronic intermittent. She reports no problems or symptoms such as urinary frequency, urgency, burning, or flank pain. There are no treatments or aggravating factors.    9. Health maintenance.  She has received the influenza vaccine, pneumococcal vaccine, and shingles vaccine. She has completed the first two doses of the COVID-19 vaccine. Her last colonoscopy was performed on 05/23/2019 by Dr. Smith, with a recommendation for a follow-up in 10 years, on 05/23/2029. Her diabetic eye exam was conducted on 02/07/2025, and the next one is due in 2026. A foot exam can be performed during this visit. Her DEXA scan is scheduled for 07/19/2025. Her hemoglobin A1c was tested today and the results were good. Her mammogram is due on 07/24/2026. A  urine microalbumin test was performed.    PROCEDURE  Colonoscopy was performed on 05/23/2019 by Dr. Smith.  The patient received a shoulder injection in November or December 2024, which has alleviated the pain.      ICD-10-CM ICD-9-CM   1. Essential hypertension  I10 401.9   2. Mixed hyperlipidemia  E78.2 272.2   3. Controlled type 2 diabetes mellitus with hyperglycemia, without long-term current use of insulin  E11.65 250.80     790.29                Follow Up     Return in about 6 months (around 9/17/2025) for Recheck.  Patient was given instructions and counseling regarding her condition or for health maintenance advice. Please see specific information pulled into the AVS if appropriate.       Patient or patient representative verbalized consent for the use of Ambient Listening during the visit with  Yolanda Grant DNP, MARY for chart documentation. 3/17/2025  13:46 CDT    Electronically signed by Yolanda Grant DNP, MARY, 03/17/25, 1:46 PM CDT.

## 2025-05-01 ENCOUNTER — HOSPITAL ENCOUNTER (OUTPATIENT)
Dept: CT IMAGING | Facility: HOSPITAL | Age: 70
Discharge: HOME | End: 2025-05-01
Admitting: NURSE PRACTITIONER
Payer: MEDICARE

## 2025-05-01 DIAGNOSIS — E27.8 LEFT ADRENAL MASS: ICD-10-CM

## 2025-05-01 LAB — CREAT BLDA-MCNC: 1.2 MG/DL (ref 0.6–1.3)

## 2025-05-01 PROCEDURE — 25510000001 IOPAMIDOL 61 % SOLUTION: Performed by: NURSE PRACTITIONER

## 2025-05-01 PROCEDURE — 74170 CT ABD WO CNTRST FLWD CNTRST: CPT

## 2025-05-01 PROCEDURE — 82565 ASSAY OF CREATININE: CPT

## 2025-05-01 RX ORDER — IOPAMIDOL 612 MG/ML
100 INJECTION, SOLUTION INTRAVASCULAR
Status: COMPLETED | OUTPATIENT
Start: 2025-05-01 | End: 2025-05-01

## 2025-05-01 RX ADMIN — IOPAMIDOL 100 ML: 612 INJECTION, SOLUTION INTRAVENOUS at 09:46

## 2025-05-02 ENCOUNTER — RESULTS FOLLOW-UP (OUTPATIENT)
Dept: FAMILY MEDICINE CLINIC | Facility: CLINIC | Age: 70
End: 2025-05-02
Payer: MEDICARE

## 2025-05-02 DIAGNOSIS — D35.02 ADENOMA OF LEFT ADRENAL GLAND: Primary | ICD-10-CM

## 2025-07-31 DIAGNOSIS — E11.65 CONTROLLED TYPE 2 DIABETES MELLITUS WITH HYPERGLYCEMIA, WITHOUT LONG-TERM CURRENT USE OF INSULIN: ICD-10-CM

## 2025-08-01 NOTE — TELEPHONE ENCOUNTER
Rx Refill Note  Requested Prescriptions     Pending Prescriptions Disp Refills    metFORMIN (Glucophage) 500 MG tablet 180 tablet 0     Sig: Take 1 tablet by mouth 2 (Two) Times a Day With Meals.      Last office visit with prescribing clinician: 3/17/2025   Next office visit with prescribing clinician: 9/17/2025     Susie Foley MA  08/01/25, 10:18 CDT

## (undated) DEVICE — CUFF,BP,DISP,1 TUBE,ADULT,HP: Brand: MEDLINE

## (undated) DEVICE — SENSR O2 OXIMAX FNGR A/ 18IN NONSTR

## (undated) DEVICE — THE CHANNEL CLEANING BRUSH IS A NYLON FLEXI BRUSH ATTACHED TO A FLEXIBLE PLASTIC SHEATH DESIGNED TO SAFELY REMOVE DEBRIS FROM FLEXIBLE ENDOSCOPES.

## (undated) DEVICE — TBG SMPL FLTR LINE NASL 02/C02 A/ BX/100

## (undated) DEVICE — YANKAUER,BULB TIP WITH VENT: Brand: ARGYLE

## (undated) DEVICE — ENDOGATOR AUXILIARY WATER JET CONNECTOR: Brand: ENDOGATOR

## (undated) DEVICE — MASK,OXYGEN,MED CONC,ADLT,7' TUB, UC: Brand: PENDING

## (undated) DEVICE — Device: Brand: DEFENDO AIR/WATER/SUCTION AND BIOPSY VALVE